# Patient Record
Sex: MALE | Race: WHITE | NOT HISPANIC OR LATINO | Employment: OTHER | RURAL
[De-identification: names, ages, dates, MRNs, and addresses within clinical notes are randomized per-mention and may not be internally consistent; named-entity substitution may affect disease eponyms.]

---

## 2020-07-21 ENCOUNTER — HISTORICAL (OUTPATIENT)
Dept: ADMINISTRATIVE | Facility: HOSPITAL | Age: 70
End: 2020-07-21

## 2021-08-24 ENCOUNTER — OFFICE VISIT (OUTPATIENT)
Dept: FAMILY MEDICINE | Facility: CLINIC | Age: 71
End: 2021-08-24
Payer: MEDICARE

## 2021-08-24 DIAGNOSIS — L72.3 SEBACEOUS CYST: Chronic | ICD-10-CM

## 2021-08-24 DIAGNOSIS — I10 ESSENTIAL HYPERTENSION: Primary | ICD-10-CM

## 2021-08-24 DIAGNOSIS — L57.0 ACTINIC KERATOSIS: Chronic | ICD-10-CM

## 2021-08-24 DIAGNOSIS — E78.2 MIXED HYPERLIPIDEMIA: Chronic | ICD-10-CM

## 2021-08-24 LAB
ANION GAP SERPL CALCULATED.3IONS-SCNC: 11 MMOL/L (ref 7–16)
BACTERIA #/AREA URNS HPF: NORMAL /HPF
BASOPHILS # BLD AUTO: 0.04 K/UL (ref 0–0.2)
BASOPHILS NFR BLD AUTO: 0.6 % (ref 0–1)
BILIRUB UR QL STRIP: NEGATIVE
BUN SERPL-MCNC: 22 MG/DL (ref 7–18)
BUN/CREAT SERPL: 16 (ref 6–20)
CALCIUM SERPL-MCNC: 9.3 MG/DL (ref 8.5–10.1)
CHLORIDE SERPL-SCNC: 102 MMOL/L (ref 98–107)
CHOLEST SERPL-MCNC: 183 MG/DL (ref 0–200)
CHOLEST/HDLC SERPL: 4.4 {RATIO}
CLARITY UR: CLEAR
CO2 SERPL-SCNC: 31 MMOL/L (ref 21–32)
COLOR UR: YELLOW
CREAT SERPL-MCNC: 1.37 MG/DL (ref 0.7–1.3)
DIFFERENTIAL METHOD BLD: ABNORMAL
EOSINOPHIL # BLD AUTO: 0.07 K/UL (ref 0–0.5)
EOSINOPHIL NFR BLD AUTO: 1.1 % (ref 1–4)
ERYTHROCYTE [DISTWIDTH] IN BLOOD BY AUTOMATED COUNT: 14.1 % (ref 11.5–14.5)
GLUCOSE SERPL-MCNC: 96 MG/DL (ref 74–106)
GLUCOSE UR STRIP-MCNC: NEGATIVE MG/DL
HCT VFR BLD AUTO: 46.2 % (ref 40–54)
HDLC SERPL-MCNC: 42 MG/DL (ref 40–60)
HGB BLD-MCNC: 14.7 G/DL (ref 13.5–18)
IMM GRANULOCYTES # BLD AUTO: 0.03 K/UL (ref 0–0.04)
IMM GRANULOCYTES NFR BLD: 0.5 % (ref 0–0.4)
KETONES UR STRIP-SCNC: NEGATIVE MG/DL
LDLC SERPL CALC-MCNC: 122 MG/DL
LDLC/HDLC SERPL: 2.9 {RATIO}
LEUKOCYTE ESTERASE UR QL STRIP: NEGATIVE
LYMPHOCYTES # BLD AUTO: 1.72 K/UL (ref 1–4.8)
LYMPHOCYTES NFR BLD AUTO: 26.4 % (ref 27–41)
MCH RBC QN AUTO: 28.6 PG (ref 27–31)
MCHC RBC AUTO-ENTMCNC: 31.8 G/DL (ref 32–36)
MCV RBC AUTO: 89.9 FL (ref 80–96)
MONOCYTES # BLD AUTO: 0.6 K/UL (ref 0–0.8)
MONOCYTES NFR BLD AUTO: 9.2 % (ref 2–6)
MPC BLD CALC-MCNC: 11.1 FL (ref 9.4–12.4)
NEUTROPHILS # BLD AUTO: 4.05 K/UL (ref 1.8–7.7)
NEUTROPHILS NFR BLD AUTO: 62.2 % (ref 53–65)
NITRITE UR QL STRIP: NEGATIVE
NONHDLC SERPL-MCNC: 141 MG/DL
NRBC # BLD AUTO: 0 X10E3/UL
NRBC, AUTO (.00): 0 %
PH UR STRIP: 6 PH UNITS
PLATELET # BLD AUTO: 145 K/UL (ref 150–400)
POTASSIUM SERPL-SCNC: 4.1 MMOL/L (ref 3.5–5.1)
PROT UR QL STRIP: ABNORMAL
RBC # BLD AUTO: 5.14 M/UL (ref 4.6–6.2)
RBC # UR STRIP: ABNORMAL /UL
RBC #/AREA URNS HPF: NORMAL /HPF
SODIUM SERPL-SCNC: 140 MMOL/L (ref 136–145)
SP GR UR STRIP: 1.01
TRIGL SERPL-MCNC: 95 MG/DL (ref 35–150)
UROBILINOGEN UR STRIP-ACNC: 0.2 MG/DL
VLDLC SERPL-MCNC: 19 MG/DL
WBC # BLD AUTO: 6.51 K/UL (ref 4.5–11)
WBC #/AREA URNS HPF: NORMAL /HPF

## 2021-08-24 PROCEDURE — 80048 BASIC METABOLIC PANEL: ICD-10-PCS | Mod: ,,, | Performed by: CLINICAL MEDICAL LABORATORY

## 2021-08-24 PROCEDURE — 82043 UR ALBUMIN QUANTITATIVE: CPT | Mod: ,,, | Performed by: CLINICAL MEDICAL LABORATORY

## 2021-08-24 PROCEDURE — 17003 DESTRUCT PREMALG LES 2-14: CPT | Mod: ,,, | Performed by: FAMILY MEDICINE

## 2021-08-24 PROCEDURE — 82043 MICROALBUMIN / CREATININE RATIO URINE: ICD-10-PCS | Mod: ,,, | Performed by: CLINICAL MEDICAL LABORATORY

## 2021-08-24 PROCEDURE — 17003 DESTRUCTION, PREMALIGNANT LESIONS; SECOND THROUGH 14 LESIONS: ICD-10-PCS | Mod: ,,, | Performed by: FAMILY MEDICINE

## 2021-08-24 PROCEDURE — 80061 LIPID PANEL: ICD-10-PCS | Mod: ,,, | Performed by: CLINICAL MEDICAL LABORATORY

## 2021-08-24 PROCEDURE — 80061 LIPID PANEL: CPT | Mod: ,,, | Performed by: CLINICAL MEDICAL LABORATORY

## 2021-08-24 PROCEDURE — 81001 URINALYSIS, REFLEX TO URINE CULTURE: ICD-10-PCS | Mod: ,,, | Performed by: CLINICAL MEDICAL LABORATORY

## 2021-08-24 PROCEDURE — 17000 DESTRUCT PREMALG LESION: CPT | Mod: ,,, | Performed by: FAMILY MEDICINE

## 2021-08-24 PROCEDURE — 17000 PR DESTRUCTION(LASER SURGERY,CRYOSURGERY,CHEMOSURGERY),PREMALIGNANT LESIONS,FIRST LESION: ICD-10-PCS | Mod: ,,, | Performed by: FAMILY MEDICINE

## 2021-08-24 PROCEDURE — 85025 CBC WITH DIFFERENTIAL: ICD-10-PCS | Mod: ,,, | Performed by: CLINICAL MEDICAL LABORATORY

## 2021-08-24 PROCEDURE — 85025 COMPLETE CBC W/AUTO DIFF WBC: CPT | Mod: ,,, | Performed by: CLINICAL MEDICAL LABORATORY

## 2021-08-24 PROCEDURE — 99214 OFFICE O/P EST MOD 30 MIN: CPT | Mod: 25,,, | Performed by: FAMILY MEDICINE

## 2021-08-24 PROCEDURE — 82570 MICROALBUMIN / CREATININE RATIO URINE: ICD-10-PCS | Mod: ,,, | Performed by: CLINICAL MEDICAL LABORATORY

## 2021-08-24 PROCEDURE — 81001 URINALYSIS AUTO W/SCOPE: CPT | Mod: ,,, | Performed by: CLINICAL MEDICAL LABORATORY

## 2021-08-24 PROCEDURE — 99214 PR OFFICE/OUTPT VISIT, EST, LEVL IV, 30-39 MIN: ICD-10-PCS | Mod: 25,,, | Performed by: FAMILY MEDICINE

## 2021-08-24 PROCEDURE — 80048 BASIC METABOLIC PNL TOTAL CA: CPT | Mod: ,,, | Performed by: CLINICAL MEDICAL LABORATORY

## 2021-08-24 PROCEDURE — 82570 ASSAY OF URINE CREATININE: CPT | Mod: ,,, | Performed by: CLINICAL MEDICAL LABORATORY

## 2021-08-24 RX ORDER — LOSARTAN POTASSIUM 100 MG/1
100 TABLET ORAL DAILY
COMMUNITY
Start: 2021-06-07 | End: 2021-08-24 | Stop reason: SDUPTHER

## 2021-08-24 RX ORDER — AMLODIPINE BESYLATE 10 MG/1
10 TABLET ORAL DAILY
COMMUNITY
Start: 2021-05-20 | End: 2021-08-24 | Stop reason: SDUPTHER

## 2021-08-24 RX ORDER — OXYBUTYNIN CHLORIDE 5 MG/1
5 TABLET ORAL NIGHTLY
COMMUNITY
Start: 2021-06-04 | End: 2023-01-21

## 2021-08-24 RX ORDER — AMLODIPINE BESYLATE 10 MG/1
10 TABLET ORAL DAILY
Qty: 90 TABLET | Refills: 1 | Status: SHIPPED | OUTPATIENT
Start: 2021-08-24 | End: 2022-02-11 | Stop reason: SDUPTHER

## 2021-08-24 RX ORDER — ATORVASTATIN CALCIUM 20 MG/1
20 TABLET, FILM COATED ORAL DAILY
Qty: 90 TABLET | Refills: 1 | Status: SHIPPED | OUTPATIENT
Start: 2021-08-24 | End: 2022-04-05 | Stop reason: SDUPTHER

## 2021-08-24 RX ORDER — LOSARTAN POTASSIUM 100 MG/1
100 TABLET ORAL DAILY
Qty: 90 TABLET | Refills: 1 | Status: SHIPPED | OUTPATIENT
Start: 2021-08-24 | End: 2022-03-03

## 2021-08-24 RX ORDER — ROSUVASTATIN CALCIUM 10 MG/1
10 TABLET, COATED ORAL NIGHTLY
COMMUNITY
Start: 2021-05-21 | End: 2021-08-24

## 2021-08-26 LAB
CREAT UR-MCNC: 137 MG/DL (ref 39–259)
MICROALBUMIN UR-MCNC: 13.3 MG/DL (ref 0–2.8)
MICROALBUMIN/CREAT RATIO PNL UR: 97.1 MG/G (ref 0–30)

## 2021-08-31 ENCOUNTER — TELEPHONE (OUTPATIENT)
Dept: FAMILY MEDICINE | Facility: CLINIC | Age: 71
End: 2021-08-31

## 2021-08-31 DIAGNOSIS — R89.9 ABNORMAL LABORATORY TEST: ICD-10-CM

## 2021-08-31 DIAGNOSIS — I10 ESSENTIAL HYPERTENSION: Primary | ICD-10-CM

## 2021-09-17 ENCOUNTER — TELEPHONE (OUTPATIENT)
Dept: FAMILY MEDICINE | Facility: CLINIC | Age: 71
End: 2021-09-17

## 2021-10-09 ENCOUNTER — OFFICE VISIT (OUTPATIENT)
Dept: FAMILY MEDICINE | Facility: CLINIC | Age: 71
End: 2021-10-09
Payer: MEDICARE

## 2021-10-09 VITALS
BODY MASS INDEX: 31.42 KG/M2 | RESPIRATION RATE: 18 BRPM | OXYGEN SATURATION: 97 % | DIASTOLIC BLOOD PRESSURE: 84 MMHG | SYSTOLIC BLOOD PRESSURE: 160 MMHG | HEIGHT: 72 IN | HEART RATE: 62 BPM | WEIGHT: 232 LBS | TEMPERATURE: 98 F

## 2021-10-09 DIAGNOSIS — M54.12 CERVICAL RADICULOPATHY: Primary | ICD-10-CM

## 2021-10-09 DIAGNOSIS — I10 ESSENTIAL HYPERTENSION: ICD-10-CM

## 2021-10-09 PROCEDURE — 99051 MED SERV EVE/WKEND/HOLIDAY: CPT | Mod: ,,, | Performed by: NURSE PRACTITIONER

## 2021-10-09 PROCEDURE — 99051 PR MEDICAL SERVICES, EVE/WKEND/HOLIDAY: ICD-10-PCS | Mod: ,,, | Performed by: NURSE PRACTITIONER

## 2021-10-09 PROCEDURE — 99213 OFFICE O/P EST LOW 20 MIN: CPT | Mod: 25,,, | Performed by: NURSE PRACTITIONER

## 2021-10-09 PROCEDURE — 96372 PR INJECTION,THERAP/PROPH/DIAG2ST, IM OR SUBCUT: ICD-10-PCS | Mod: ,,, | Performed by: NURSE PRACTITIONER

## 2021-10-09 PROCEDURE — 96372 THER/PROPH/DIAG INJ SC/IM: CPT | Mod: ,,, | Performed by: NURSE PRACTITIONER

## 2021-10-09 PROCEDURE — 99213 PR OFFICE/OUTPT VISIT, EST, LEVL III, 20-29 MIN: ICD-10-PCS | Mod: 25,,, | Performed by: NURSE PRACTITIONER

## 2021-10-09 RX ORDER — KETOROLAC TROMETHAMINE 30 MG/ML
30 INJECTION, SOLUTION INTRAMUSCULAR; INTRAVENOUS
Status: COMPLETED | OUTPATIENT
Start: 2021-10-09 | End: 2021-10-09

## 2021-10-09 RX ORDER — DEXAMETHASONE SODIUM PHOSPHATE 4 MG/ML
8 INJECTION, SOLUTION INTRA-ARTICULAR; INTRALESIONAL; INTRAMUSCULAR; INTRAVENOUS; SOFT TISSUE
Status: COMPLETED | OUTPATIENT
Start: 2021-10-09 | End: 2021-10-09

## 2021-10-09 RX ORDER — ASPIRIN 81 MG/1
81 TABLET ORAL DAILY
COMMUNITY

## 2021-10-09 RX ORDER — METHYLPREDNISOLONE 4 MG/1
TABLET ORAL
Qty: 1 PACKAGE | Refills: 0 | Status: SHIPPED | OUTPATIENT
Start: 2021-10-09 | End: 2021-10-30

## 2021-10-09 RX ADMIN — KETOROLAC TROMETHAMINE 30 MG: 30 INJECTION, SOLUTION INTRAMUSCULAR; INTRAVENOUS at 09:10

## 2021-10-09 RX ADMIN — DEXAMETHASONE SODIUM PHOSPHATE 8 MG: 4 INJECTION, SOLUTION INTRA-ARTICULAR; INTRALESIONAL; INTRAMUSCULAR; INTRAVENOUS; SOFT TISSUE at 09:10

## 2022-02-11 DIAGNOSIS — I10 ESSENTIAL HYPERTENSION: ICD-10-CM

## 2022-02-11 RX ORDER — AMLODIPINE BESYLATE 10 MG/1
10 TABLET ORAL DAILY
Qty: 90 TABLET | Refills: 0 | Status: SHIPPED | OUTPATIENT
Start: 2022-02-11 | End: 2022-04-05 | Stop reason: SDUPTHER

## 2022-02-23 ENCOUNTER — OFFICE VISIT (OUTPATIENT)
Dept: FAMILY MEDICINE | Facility: CLINIC | Age: 72
End: 2022-02-23
Payer: MEDICARE

## 2022-02-23 DIAGNOSIS — Z02.89 ENCOUNTER FOR OTHER ADMINISTRATIVE EXAMINATIONS: Primary | ICD-10-CM

## 2022-02-23 PROCEDURE — 99499 UNLISTED E&M SERVICE: CPT | Mod: ,,, | Performed by: NURSE PRACTITIONER

## 2022-02-23 PROCEDURE — 99499 PR PHYSICAL - DOT/CDL: ICD-10-PCS | Mod: ,,, | Performed by: NURSE PRACTITIONER

## 2022-02-24 VITALS
RESPIRATION RATE: 20 BRPM | DIASTOLIC BLOOD PRESSURE: 64 MMHG | BODY MASS INDEX: 30.16 KG/M2 | TEMPERATURE: 99 F | HEART RATE: 62 BPM | WEIGHT: 235 LBS | SYSTOLIC BLOOD PRESSURE: 138 MMHG | HEIGHT: 74 IN | OXYGEN SATURATION: 97 %

## 2022-02-24 NOTE — PROGRESS NOTES
Jailyn Mulligan DNP, EMANI    [address] [place] [address]     PATIENT NAME: Theodore Begum  : 1950  DATE: 22  MRN: [id number]      Billing Provider: Jailyn Mulligan DNP, FNP  Level of Service: NO LOS  Patient PCP Information     Provider PCP Type    Joseph Alex MD General          Reason for Visit / Chief Complaint: No chief complaint on file.       Update PCP  Update Chief Complaint         History of Present Illness / Problem Focused Workflow     Theodore Begum presents to the clinic with No chief complaint on file.     Pt here for DOT. Hx htn. Established pt of Dr. Alex.       Review of Systems     Review of Systems   Constitutional: Negative for activity change and appetite change.   HENT: Negative for dental problem, ear pain, sinus pressure/congestion and sore throat.    Respiratory: Negative for cough, chest tightness and shortness of breath.    Cardiovascular: Negative for chest pain and palpitations.   Gastrointestinal: Negative for abdominal pain.   Genitourinary: Negative for bladder incontinence and dysuria.   Musculoskeletal: Negative for arthralgias.   Integumentary:  Negative for rash.   Neurological: Negative for dizziness, weakness and numbness.        Medical / Social / Family History     Past Medical History:   Diagnosis Date    Hyperlipidemia     Hypertension        Past Surgical History:   Procedure Laterality Date    ABDOMINAL AORTA STENT      CHOLECYSTECTOMY      HERNIA REPAIR      PROSTATE SURGERY      REPLACEMENT OF STENT         Social History  Mr. Theodore Begum  reports that he has been smoking cigarettes. He has smoked for the past 1.00 year. He has never used smokeless tobacco. He reports that he does not drink alcohol and does not use drugs.    Family History  Mr. Theodore Begum's family history includes No Known Problems in his father and mother.    Medications and Allergies     Medications  Outpatient Medications Marked as  Taking for the 2/23/22 encounter (Office Visit) with Jailyn Mulligan, DNP, FNP   Medication Sig Dispense Refill    amLODIPine (NORVASC) 10 MG tablet Take 1 tablet (10 mg total) by mouth once daily. For BLOOD PRESSURE 90 tablet 0    aspirin (ECOTRIN) 81 MG EC tablet Take 81 mg by mouth once daily.      atorvastatin (LIPITOR) 20 MG tablet Take 1 tablet (20 mg total) by mouth once daily. For CHOLESTEROL 90 tablet 1    losartan (COZAAR) 100 MG tablet Take 1 tablet (100 mg total) by mouth once daily. For BLOOD PRESSURE 90 tablet 1    oxybutynin (DITROPAN) 5 MG Tab Take 5 mg by mouth every evening.         Allergies  Review of patient's allergies indicates:   Allergen Reactions    Moxifloxacin Shortness Of Breath    Cefaclor Other (See Comments)     Abdominal pain       Physical Examination     Vitals:    02/23/22 1730   BP: 138/64   Pulse:    Resp:    Temp:      Physical Exam  Vitals and nursing note reviewed.   Constitutional:       General: He is not in acute distress.     Appearance: He is normal weight.   HENT:      Mouth/Throat:      Mouth: Mucous membranes are moist.   Eyes:      Pupils: Pupils are equal, round, and reactive to light.   Cardiovascular:      Rate and Rhythm: Normal rate and regular rhythm.      Pulses: Normal pulses.      Heart sounds: No murmur heard.  Pulmonary:      Effort: Pulmonary effort is normal. No respiratory distress.      Breath sounds: Normal breath sounds. No wheezing, rhonchi or rales.   Chest:      Chest wall: No tenderness.   Abdominal:      General: Bowel sounds are normal. There is no distension.      Palpations: Abdomen is soft.      Tenderness: There is no abdominal tenderness. There is no right CVA tenderness, left CVA tenderness, guarding or rebound.   Musculoskeletal:         General: No swelling or tenderness. Normal range of motion.      Cervical back: Normal range of motion and neck supple.      Right lower leg: No edema.      Left lower leg: No edema.   Skin:      General: Skin is warm and dry.   Neurological:      General: No focal deficit present.      Mental Status: He is alert and oriented to person, place, and time.   Psychiatric:         Mood and Affect: Mood normal.         Behavior: Behavior normal.         Thought Content: Thought content normal.         Judgment: Judgment normal.          Assessment and Plan (including Health Maintenance)      Problem List  Smart Sets  Document Outside HM   :    Plan:     See scanned document.     Health Maintenance Due   Topic Date Due    Hepatitis C Screening  Never done    TETANUS VACCINE  Never done    Colorectal Cancer Screening  Never done    Shingles Vaccine (1 of 2) Never done    Pneumococcal Vaccines (Age 65+) (1 of 1 - PPSV23) Never done    Abdominal Aortic Aneurysm Screening  Never done    COVID-19 Vaccine (2 - Pfizer 3-dose series) 03/05/2021    Influenza Vaccine (1) Never done       Problem List Items Addressed This Visit    None     Visit Diagnoses     Encounter for other administrative examinations    -  Primary        Encounter for other administrative examinations       Health Maintenance Topics with due status: Not Due       Topic Last Completion Date    Lipid Panel 08/24/2021       No future appointments.     No follow-ups on file.     Signature:  Jailyn Mulligan DNP, FNP  [address] [place] [address]  Phone #: [phone]  Fax #: [phone]    Date of encounter: 2/23/22    Patient Instructions   Follow up in 1 year for DOT. Follow up with primary care provider as needed.

## 2022-03-03 DIAGNOSIS — I10 ESSENTIAL HYPERTENSION: ICD-10-CM

## 2022-03-03 RX ORDER — LOSARTAN POTASSIUM 100 MG/1
TABLET ORAL
Qty: 90 TABLET | OUTPATIENT
Start: 2022-03-03

## 2022-03-03 NOTE — TELEPHONE ENCOUNTER
Tell him his pharmacy denied the prescription. They are trying to force me to prescribe 90 day refill, when he is due a follow up.

## 2022-03-11 DIAGNOSIS — Z71.89 COMPLEX CARE COORDINATION: ICD-10-CM

## 2022-04-05 ENCOUNTER — OFFICE VISIT (OUTPATIENT)
Dept: FAMILY MEDICINE | Facility: CLINIC | Age: 72
End: 2022-04-05
Payer: MEDICARE

## 2022-04-05 VITALS
OXYGEN SATURATION: 94 % | SYSTOLIC BLOOD PRESSURE: 156 MMHG | DIASTOLIC BLOOD PRESSURE: 79 MMHG | RESPIRATION RATE: 18 BRPM | BODY MASS INDEX: 31.14 KG/M2 | HEIGHT: 73 IN | HEART RATE: 68 BPM | WEIGHT: 235 LBS

## 2022-04-05 DIAGNOSIS — Z87.891 HISTORY OF TOBACCO USE: ICD-10-CM

## 2022-04-05 DIAGNOSIS — I10 ESSENTIAL HYPERTENSION: ICD-10-CM

## 2022-04-05 DIAGNOSIS — E78.2 MIXED HYPERLIPIDEMIA: Chronic | ICD-10-CM

## 2022-04-05 DIAGNOSIS — J41.0 SIMPLE CHRONIC BRONCHITIS: Primary | Chronic | ICD-10-CM

## 2022-04-05 DIAGNOSIS — F17.200 TOBACCO DEPENDENCE SYNDROME: ICD-10-CM

## 2022-04-05 LAB
ANION GAP SERPL CALCULATED.3IONS-SCNC: 7 MMOL/L (ref 7–16)
BACTERIA #/AREA URNS HPF: ABNORMAL /HPF
BASOPHILS # BLD AUTO: 0.05 K/UL (ref 0–0.2)
BASOPHILS NFR BLD AUTO: 0.8 % (ref 0–1)
BILIRUB UR QL STRIP: NEGATIVE
BUN SERPL-MCNC: 22 MG/DL (ref 7–18)
BUN/CREAT SERPL: 15 (ref 6–20)
CALCIUM SERPL-MCNC: 9.1 MG/DL (ref 8.5–10.1)
CHLORIDE SERPL-SCNC: 106 MMOL/L (ref 98–107)
CHOLEST SERPL-MCNC: 113 MG/DL (ref 0–200)
CHOLEST/HDLC SERPL: 2.6 {RATIO}
CLARITY UR: CLEAR
CO2 SERPL-SCNC: 34 MMOL/L (ref 21–32)
COLOR UR: YELLOW
CREAT SERPL-MCNC: 1.48 MG/DL (ref 0.7–1.3)
CREAT UR-MCNC: 194 MG/DL (ref 39–259)
DIFFERENTIAL METHOD BLD: ABNORMAL
EOSINOPHIL # BLD AUTO: 0.11 K/UL (ref 0–0.5)
EOSINOPHIL NFR BLD AUTO: 1.7 % (ref 1–4)
ERYTHROCYTE [DISTWIDTH] IN BLOOD BY AUTOMATED COUNT: 14.4 % (ref 11.5–14.5)
GLUCOSE SERPL-MCNC: 108 MG/DL (ref 74–106)
GLUCOSE UR STRIP-MCNC: NEGATIVE MG/DL
HCT VFR BLD AUTO: 44.2 % (ref 40–54)
HDLC SERPL-MCNC: 44 MG/DL (ref 40–60)
HGB BLD-MCNC: 13.4 G/DL (ref 13.5–18)
IMM GRANULOCYTES # BLD AUTO: 0.02 K/UL (ref 0–0.04)
IMM GRANULOCYTES NFR BLD: 0.3 % (ref 0–0.4)
KETONES UR STRIP-SCNC: NEGATIVE MG/DL
LDLC SERPL CALC-MCNC: 45 MG/DL
LDLC/HDLC SERPL: 1 {RATIO}
LEUKOCYTE ESTERASE UR QL STRIP: NEGATIVE
LYMPHOCYTES # BLD AUTO: 1.92 K/UL (ref 1–4.8)
LYMPHOCYTES NFR BLD AUTO: 29.7 % (ref 27–41)
MCH RBC QN AUTO: 28.6 PG (ref 27–31)
MCHC RBC AUTO-ENTMCNC: 30.3 G/DL (ref 32–36)
MCV RBC AUTO: 94.4 FL (ref 80–96)
MICROALBUMIN UR-MCNC: 42.5 MG/DL (ref 0–2.8)
MICROALBUMIN/CREAT RATIO PNL UR: 219.1 MG/G (ref 0–30)
MONOCYTES # BLD AUTO: 0.51 K/UL (ref 0–0.8)
MONOCYTES NFR BLD AUTO: 7.9 % (ref 2–6)
MPC BLD CALC-MCNC: 10.7 FL (ref 9.4–12.4)
NEUTROPHILS # BLD AUTO: 3.86 K/UL (ref 1.8–7.7)
NEUTROPHILS NFR BLD AUTO: 59.6 % (ref 53–65)
NITRITE UR QL STRIP: NEGATIVE
NONHDLC SERPL-MCNC: 69 MG/DL
NRBC # BLD AUTO: 0 X10E3/UL
NRBC, AUTO (.00): 0 %
PH UR STRIP: 6.5 PH UNITS
PLATELET # BLD AUTO: 171 K/UL (ref 150–400)
POTASSIUM SERPL-SCNC: 4 MMOL/L (ref 3.5–5.1)
PROT UR QL STRIP: 30
RBC # BLD AUTO: 4.68 M/UL (ref 4.6–6.2)
RBC # UR STRIP: ABNORMAL /UL
RBC #/AREA URNS HPF: ABNORMAL /HPF
SODIUM SERPL-SCNC: 143 MMOL/L (ref 136–145)
SP GR UR STRIP: 1.02
SQUAMOUS #/AREA URNS LPF: ABNORMAL /LPF
TRIGL SERPL-MCNC: 121 MG/DL (ref 35–150)
UROBILINOGEN UR STRIP-ACNC: 1 MG/DL
VLDLC SERPL-MCNC: 24 MG/DL
WBC # BLD AUTO: 6.47 K/UL (ref 4.5–11)
WBC #/AREA URNS HPF: ABNORMAL /HPF

## 2022-04-05 PROCEDURE — 81001 URINALYSIS: ICD-10-PCS | Mod: ,,, | Performed by: CLINICAL MEDICAL LABORATORY

## 2022-04-05 PROCEDURE — 99406 PR TOBACCO USE CESSATION INTERMEDIATE 3-10 MINUTES: ICD-10-PCS | Mod: ,,, | Performed by: FAMILY MEDICINE

## 2022-04-05 PROCEDURE — 80048 BASIC METABOLIC PNL TOTAL CA: CPT | Mod: ,,, | Performed by: CLINICAL MEDICAL LABORATORY

## 2022-04-05 PROCEDURE — 99406 BEHAV CHNG SMOKING 3-10 MIN: CPT | Mod: ,,, | Performed by: FAMILY MEDICINE

## 2022-04-05 PROCEDURE — 99214 OFFICE O/P EST MOD 30 MIN: CPT | Mod: 25,,, | Performed by: FAMILY MEDICINE

## 2022-04-05 PROCEDURE — 99214 PR OFFICE/OUTPT VISIT, EST, LEVL IV, 30-39 MIN: ICD-10-PCS | Mod: 25,,, | Performed by: FAMILY MEDICINE

## 2022-04-05 PROCEDURE — 82043 MICROALBUMIN / CREATININE RATIO URINE: ICD-10-PCS | Mod: ,,, | Performed by: CLINICAL MEDICAL LABORATORY

## 2022-04-05 PROCEDURE — 80061 LIPID PANEL: CPT | Mod: ,,, | Performed by: CLINICAL MEDICAL LABORATORY

## 2022-04-05 PROCEDURE — 81001 URINALYSIS AUTO W/SCOPE: CPT | Mod: ,,, | Performed by: CLINICAL MEDICAL LABORATORY

## 2022-04-05 PROCEDURE — 85025 CBC WITH DIFFERENTIAL: ICD-10-PCS | Mod: ,,, | Performed by: CLINICAL MEDICAL LABORATORY

## 2022-04-05 PROCEDURE — 80048 BASIC METABOLIC PANEL: ICD-10-PCS | Mod: ,,, | Performed by: CLINICAL MEDICAL LABORATORY

## 2022-04-05 PROCEDURE — 85025 COMPLETE CBC W/AUTO DIFF WBC: CPT | Mod: ,,, | Performed by: CLINICAL MEDICAL LABORATORY

## 2022-04-05 PROCEDURE — 82570 ASSAY OF URINE CREATININE: CPT | Mod: ,,, | Performed by: CLINICAL MEDICAL LABORATORY

## 2022-04-05 PROCEDURE — 82570 MICROALBUMIN / CREATININE RATIO URINE: ICD-10-PCS | Mod: ,,, | Performed by: CLINICAL MEDICAL LABORATORY

## 2022-04-05 PROCEDURE — 80061 LIPID PANEL: ICD-10-PCS | Mod: ,,, | Performed by: CLINICAL MEDICAL LABORATORY

## 2022-04-05 PROCEDURE — 82043 UR ALBUMIN QUANTITATIVE: CPT | Mod: ,,, | Performed by: CLINICAL MEDICAL LABORATORY

## 2022-04-05 RX ORDER — ALBUTEROL SULFATE 90 UG/1
2 AEROSOL, METERED RESPIRATORY (INHALATION) EVERY 6 HOURS PRN
Qty: 18 G | Refills: 5 | Status: SHIPPED | OUTPATIENT
Start: 2022-04-05 | End: 2023-10-05

## 2022-04-05 RX ORDER — AMLODIPINE BESYLATE 10 MG/1
10 TABLET ORAL DAILY
Qty: 90 TABLET | Refills: 1 | Status: SHIPPED | OUTPATIENT
Start: 2022-04-05 | End: 2022-05-05 | Stop reason: SINTOL

## 2022-04-05 RX ORDER — BUPROPION HYDROCHLORIDE 100 MG/1
100 TABLET ORAL 2 TIMES DAILY
Qty: 60 TABLET | Refills: 2 | Status: SHIPPED | OUTPATIENT
Start: 2022-04-05 | End: 2022-08-08 | Stop reason: ALTCHOICE

## 2022-04-05 RX ORDER — ATORVASTATIN CALCIUM 20 MG/1
20 TABLET, FILM COATED ORAL DAILY
Qty: 90 TABLET | Refills: 1 | Status: SHIPPED | OUTPATIENT
Start: 2022-04-05 | End: 2022-08-08

## 2022-04-05 RX ORDER — LOSARTAN POTASSIUM 100 MG/1
100 TABLET ORAL DAILY
Qty: 90 TABLET | Refills: 1 | Status: SHIPPED | OUTPATIENT
Start: 2022-04-05 | End: 2022-10-03

## 2022-04-05 NOTE — PROGRESS NOTES
Joseph Alex MD    [address] [place] [address]     PATIENT NAME: Theodore Begum  : 1950  DATE: 22  MRN: [id number]      Billing Provider: Joseph Alex MD  Level of Service:   Patient PCP Information     Provider PCP Type    Joseph Alex MD General          Reason for Visit / Chief Complaint: Follow-up (Follow up on chronic health problems and medication refills)       Update PCP  Update Chief Complaint         History of Present Illness / Problem Focused Workflow     Theodore Begum presents to the clinic with Follow-up (Follow up on chronic health problems and medication refills)     HPI    Review of Systems     Review of Systems   Constitutional: Negative for activity change, appetite change, chills, fatigue and fever.   HENT: Negative for nasal congestion, ear pain, hearing loss, postnasal drip and sore throat.    Respiratory: Negative for cough, chest tightness, shortness of breath and wheezing.    Cardiovascular: Negative for chest pain, palpitations, leg swelling and claudication.   Gastrointestinal: Negative for abdominal pain, change in bowel habit, constipation, diarrhea, nausea, vomiting and change in bowel habit.   Genitourinary: Negative for dysuria.   Musculoskeletal: Negative for arthralgias, back pain, gait problem and myalgias.   Integumentary:  Negative for rash.   Neurological: Negative for weakness and headaches.   Psychiatric/Behavioral: Negative for suicidal ideas. The patient is not nervous/anxious.         Medical / Social / Family History     History reviewed. No pertinent past medical history.    Past Surgical History:   Procedure Laterality Date    ABDOMINAL AORTA STENT      CHOLECYSTECTOMY      HERNIA REPAIR      PROSTATE SURGERY      REPLACEMENT OF STENT         Social History    reports that he has been smoking cigarettes. He has smoked for the past 1.00 year. He has never used smokeless tobacco. He  reports that he does not drink alcohol and does not use drugs.    Family History  MrJalil's family history includes No Known Problems in his father and mother.    Medications and Allergies     Medications  Outpatient Medications Marked as Taking for the 4/5/22 encounter (Office Visit) with Joseph Alex MD   Medication Sig Dispense Refill    aspirin (ECOTRIN) 81 MG EC tablet Take 81 mg by mouth once daily.      oxybutynin (DITROPAN) 5 MG Tab Take 5 mg by mouth every evening.      [DISCONTINUED] amLODIPine (NORVASC) 10 MG tablet Take 1 tablet (10 mg total) by mouth once daily. For BLOOD PRESSURE 90 tablet 0    [DISCONTINUED] atorvastatin (LIPITOR) 20 MG tablet Take 1 tablet (20 mg total) by mouth once daily. For CHOLESTEROL 90 tablet 1    [DISCONTINUED] losartan (COZAAR) 100 MG tablet TAKE 1 TABLET BY MOUTH EVERY DAY 30 tablet 0       Allergies  Review of patient's allergies indicates:   Allergen Reactions    Moxifloxacin Shortness Of Breath    Cefaclor Other (See Comments)     Abdominal pain       Physical Examination     Vitals:    04/05/22 1435   BP: (!) 156/79   Pulse:    Resp:      Physical Exam  Vitals and nursing note reviewed.   Constitutional:       General: He is not in acute distress.     Appearance: Normal appearance. He is not ill-appearing.   Eyes:      Extraocular Movements: Extraocular movements intact.      Pupils: Pupils are equal, round, and reactive to light.   Cardiovascular:      Rate and Rhythm: Normal rate and regular rhythm.      Heart sounds: Normal heart sounds.   Pulmonary:      Effort: Pulmonary effort is normal.      Breath sounds: Normal breath sounds.   Abdominal:      General: Bowel sounds are normal.      Palpations: Abdomen is soft.   Musculoskeletal:         General: Normal range of motion.   Skin:     Findings: No rash.   Neurological:      General: No focal deficit present.      Mental Status: He is alert and oriented to person, place, and time. Mental status is at  baseline.   Psychiatric:         Mood and Affect: Mood normal.         Behavior: Behavior normal.          Assessment and Plan (including Health Maintenance)      Problem List  Smart Sets  Document Outside HM   :    Plan:         Health Maintenance Due   Topic Date Due    TETANUS VACCINE  Never done    Colorectal Cancer Screening  Never done    Pneumococcal Vaccines (Age 65+) (1 of 1 - PPSV23) Never done    Abdominal Aortic Aneurysm Screening  Never done    COVID-19 Vaccine (2 - Pfizer 3-dose series) 03/05/2021       Problem List Items Addressed This Visit        Pulmonary    Simple chronic bronchitis - Primary (Chronic)    Relevant Medications    albuterol (VENTOLIN HFA) 90 mcg/actuation inhaler       Cardiac/Vascular    Mixed hyperlipidemia (Chronic)    Relevant Medications    atorvastatin (LIPITOR) 20 MG tablet    Essential hypertension (Chronic)    Current Assessment & Plan      - Goal blood pressure for most patients is less than 130/85. Both numbers are important. If you have questions about your specific goal blood pressure, please ask at your clinic visits.   - Check blood pressure outside of clinic, record the numbers, and bring the log to all your office visits.   - Take a daily, 81mg Aspirin, unless instructed to take a different dose for another medical purpose. Also do not take Aspirin if you have a history of adverse reaction to Aspirin.   - If you have any chest pain, SOB, or other concerning symptoms, report these immediately, or go to the nearest ER.    - Recommend cardiovascular exercise, at least 3 times per week, for at least 15 minutes.             Relevant Medications    amLODIPine (NORVASC) 10 MG tablet    losartan (COZAAR) 100 MG tablet    Other Relevant Orders    Basic Metabolic Panel    CBC Auto Differential    Lipid Panel    Microalbumin/Creatinine Ratio, Urine    Urinalysis        Simple chronic bronchitis  -     albuterol (VENTOLIN HFA) 90 mcg/actuation inhaler; Inhale 2 puffs into  the lungs every 6 (six) hours as needed for Wheezing. Rescue  Dispense: 18 g; Refill: 5    Essential hypertension  -     amLODIPine (NORVASC) 10 MG tablet; Take 1 tablet (10 mg total) by mouth once daily. For BLOOD PRESSURE  Dispense: 90 tablet; Refill: 1  -     losartan (COZAAR) 100 MG tablet; Take 1 tablet (100 mg total) by mouth once daily. For BLOOD PRESSURE  Dispense: 90 tablet; Refill: 1  -     Basic Metabolic Panel; Future; Expected date: 04/05/2022  -     CBC Auto Differential; Future; Expected date: 04/05/2022  -     Lipid Panel; Future; Expected date: 04/05/2022  -     Microalbumin/Creatinine Ratio, Urine; Future; Expected date: 04/05/2022  -     Urinalysis; Future; Expected date: 04/05/2022    Mixed hyperlipidemia  -     atorvastatin (LIPITOR) 20 MG tablet; Take 1 tablet (20 mg total) by mouth once daily. For CHOLESTEROL  Dispense: 90 tablet; Refill: 1       Health Maintenance Topics with due status: Not Due       Topic Last Completion Date    Lipid Panel 08/24/2021       Procedures     Future Appointments   Date Time Provider Department Center   10/3/2022  2:00 PM AWV NURSE, [facility] [place]        No follow-ups on file.     Signature:  Joseph Alex MD  [address] [place] [address]  Phone #: [phone]  Fax #: [phone]    Date of encounter: 4/5/22    There are no Patient Instructions on file for this visit.     Tobacco Use/Cessation:  I assessed Theodore Begum and discussed smoking cessation with him for 3-10 minutes. He desires to stop smoking. He is concerned that he is unable to stop. We added Buproprion to attempt to help with the cravings.   Social History     Tobacco Use   Smoking Status Current Every Day Smoker    Years: 1.00    Types: Cigarettes   Smokeless Tobacco Never Used

## 2022-04-08 ENCOUNTER — TELEPHONE (OUTPATIENT)
Dept: FAMILY MEDICINE | Facility: CLINIC | Age: 72
End: 2022-04-08
Payer: MEDICARE

## 2022-04-08 DIAGNOSIS — R94.4 RENAL FUNCTION TEST ABNORMAL: Primary | ICD-10-CM

## 2022-04-08 NOTE — TELEPHONE ENCOUNTER
----- Message from Joseph Alex MD sent at 4/7/2022  9:17 AM CDT -----  Renal function is mildly reduced, findings are worse than previous labs, also worsening of proteinuria     With the strong family history of amyloidosis this likely needs to be further evaluated, Nephrology is likely the best location for him

## 2022-04-18 ENCOUNTER — TELEPHONE (OUTPATIENT)
Dept: FAMILY MEDICINE | Facility: CLINIC | Age: 72
End: 2022-04-18
Payer: MEDICARE

## 2022-04-18 DIAGNOSIS — Z83.49 FAMILY HISTORY OF AMYLOIDOSIS: Primary | ICD-10-CM

## 2022-04-18 DIAGNOSIS — N18.30 CKD (CHRONIC KIDNEY DISEASE), SYMPTOM MANAGEMENT ONLY, STAGE 3 (MODERATE): Primary | ICD-10-CM

## 2022-04-18 DIAGNOSIS — R80.1 PERSISTENT PROTEINURIA: ICD-10-CM

## 2022-04-18 DIAGNOSIS — Z83.49 FAMILY HISTORY OF AMYLOIDOSIS: ICD-10-CM

## 2022-04-18 NOTE — TELEPHONE ENCOUNTER
Susi: I referred this pt to [facility] and they stated that according to labs he does not meet the criteria to see a nephrologist. His renal is not elevated enough.. So, should I try to refer him to someone else and see or what do yall recommend?    I spoke to Dr. Alex and he states to try again with this diagnosis. We put the wrong one when ordering.     Diagnoses should be:  1. CKD stage 3a  2. Family history of Amyloidosis, systemic  3. Proteinuria

## 2022-05-05 ENCOUNTER — OFFICE VISIT (OUTPATIENT)
Dept: FAMILY MEDICINE | Facility: CLINIC | Age: 72
End: 2022-05-05
Payer: MEDICARE

## 2022-05-05 VITALS
OXYGEN SATURATION: 94 % | SYSTOLIC BLOOD PRESSURE: 162 MMHG | HEART RATE: 61 BPM | DIASTOLIC BLOOD PRESSURE: 76 MMHG | HEIGHT: 73 IN | RESPIRATION RATE: 18 BRPM | BODY MASS INDEX: 31.41 KG/M2 | WEIGHT: 237 LBS

## 2022-05-05 DIAGNOSIS — G60.9 IDIOPATHIC PERIPHERAL NEUROPATHY: Chronic | ICD-10-CM

## 2022-05-05 DIAGNOSIS — Z12.11 SCREENING FOR MALIGNANT NEOPLASM OF COLON: Primary | ICD-10-CM

## 2022-05-05 DIAGNOSIS — R60.0 BILATERAL LOWER EXTREMITY EDEMA: Chronic | ICD-10-CM

## 2022-05-05 DIAGNOSIS — I83.93 VARICOSE VEINS OF BOTH LOWER EXTREMITIES WITHOUT ULCER OR INFLAMMATION: Chronic | ICD-10-CM

## 2022-05-05 DIAGNOSIS — I10 ESSENTIAL HYPERTENSION: Chronic | ICD-10-CM

## 2022-05-05 PROCEDURE — 99214 OFFICE O/P EST MOD 30 MIN: CPT | Mod: ,,, | Performed by: FAMILY MEDICINE

## 2022-05-05 PROCEDURE — 99214 PR OFFICE/OUTPT VISIT, EST, LEVL IV, 30-39 MIN: ICD-10-PCS | Mod: ,,, | Performed by: FAMILY MEDICINE

## 2022-05-05 RX ORDER — HYDROCHLOROTHIAZIDE 25 MG/1
25 TABLET ORAL DAILY
Qty: 30 TABLET | Refills: 2 | Status: SHIPPED | OUTPATIENT
Start: 2022-05-05 | End: 2022-05-05 | Stop reason: CLARIF

## 2022-05-05 RX ORDER — GABAPENTIN 100 MG/1
100 CAPSULE ORAL 3 TIMES DAILY
Qty: 90 CAPSULE | Refills: 2 | Status: SHIPPED | OUTPATIENT
Start: 2022-05-05 | End: 2022-05-05 | Stop reason: CLARIF

## 2022-05-05 RX ORDER — HYDROCHLOROTHIAZIDE 25 MG/1
25 TABLET ORAL DAILY
Qty: 30 TABLET | Refills: 2 | Status: SHIPPED | OUTPATIENT
Start: 2022-05-05 | End: 2022-08-01 | Stop reason: SDUPTHER

## 2022-05-05 RX ORDER — GABAPENTIN 100 MG/1
100 CAPSULE ORAL 3 TIMES DAILY
Qty: 90 CAPSULE | Refills: 2 | Status: SHIPPED | OUTPATIENT
Start: 2022-05-05 | End: 2022-10-20

## 2022-05-05 NOTE — PROGRESS NOTES
Joseph Alex MD    [address] [place] [address]     PATIENT NAME: Theodore Begum  : 1950  DATE: 22  MRN: [id number]      Billing Provider: Joseph Alex MD  Level of Service:   Patient PCP Information     Provider PCP Type    Joseph Alex MD General          Reason for Visit / Chief Complaint: Foot Pain (Patient states he has burning on the bottom of his left foot for a long time. Patient states there is some swelling present after being on it. )       Update PCP  Update Chief Complaint         History of Present Illness / Problem Focused Workflow     Theodore Begum presents to the clinic with Foot Pain (Patient states he has burning on the bottom of his left foot for a long time. Patient states there is some swelling present after being on it. )     Bilateral foot swelling - right foot has been swollen since he broke his right leg. Left foot has swelling just in the foot, occasionally the swelling reaches his calf, but usually in the foot. He also reports burning in his left foot pretty much all day everyday.     He is concerned the swelling may be secondary to amlodipine. But he also reports a history of varicose veins, and a procedure to fix that.         HPI    Review of Systems     Review of Systems   Constitutional: Negative for activity change, appetite change, chills, fatigue and fever.   HENT: Negative for nasal congestion, ear pain, hearing loss, postnasal drip and sore throat.    Respiratory: Negative for cough, chest tightness, shortness of breath and wheezing.    Cardiovascular: Negative for chest pain, palpitations, leg swelling and claudication.   Gastrointestinal: Negative for abdominal pain, change in bowel habit, constipation, diarrhea, nausea, vomiting and change in bowel habit.   Genitourinary: Negative for dysuria.   Musculoskeletal: Negative for arthralgias, back pain, gait problem and myalgias.   Integumentary:   Negative for rash.   Neurological: Negative for weakness and headaches.   Psychiatric/Behavioral: Negative for suicidal ideas. The patient is not nervous/anxious.         Medical / Social / Family History   History reviewed. No pertinent past medical history.    Past Surgical History:   Procedure Laterality Date    ABDOMINAL AORTA STENT      CHOLECYSTECTOMY      HERNIA REPAIR      PROSTATE SURGERY      REPLACEMENT OF STENT         Social History    reports that he has been smoking cigarettes. He has smoked for the past 1.00 year. He has never used smokeless tobacco. He reports that he does not drink alcohol and does not use drugs.    Family History  's family history includes No Known Problems in his father and mother.    Medications and Allergies     Medications  Outpatient Medications Marked as Taking for the 5/5/22 encounter (Office Visit) with Joseph Alex MD   Medication Sig Dispense Refill    albuterol (VENTOLIN HFA) 90 mcg/actuation inhaler Inhale 2 puffs into the lungs every 6 (six) hours as needed for Wheezing. Rescue 18 g 5    aspirin (ECOTRIN) 81 MG EC tablet Take 81 mg by mouth once daily.      atorvastatin (LIPITOR) 20 MG tablet Take 1 tablet (20 mg total) by mouth once daily. For CHOLESTEROL 90 tablet 1    buPROPion (WELLBUTRIN) 100 MG tablet Take 1 tablet (100 mg total) by mouth 2 (two) times daily. For MOOD 60 tablet 2    losartan (COZAAR) 100 MG tablet Take 1 tablet (100 mg total) by mouth once daily. For BLOOD PRESSURE 90 tablet 1    oxybutynin (DITROPAN) 5 MG Tab Take 5 mg by mouth every evening.      [DISCONTINUED] amLODIPine (NORVASC) 10 MG tablet Take 1 tablet (10 mg total) by mouth once daily. For BLOOD PRESSURE 90 tablet 1       Allergies  Review of patient's allergies indicates:   Allergen Reactions    Moxifloxacin Shortness Of Breath    Cefaclor Other (See Comments)     Abdominal pain       Physical Examination     Vitals:    05/05/22 1452   BP: (!) 162/76    Pulse:    Resp:      Physical Exam  Vitals and nursing note reviewed.   Constitutional:       General: He is not in acute distress.     Appearance: Normal appearance. He is not ill-appearing.   Eyes:      Extraocular Movements: Extraocular movements intact.      Pupils: Pupils are equal, round, and reactive to light.   Cardiovascular:      Rate and Rhythm: Normal rate and regular rhythm.      Heart sounds: Normal heart sounds.      Comments: Varicose veins present throughout bilateral lower extremities   Pulmonary:      Effort: Pulmonary effort is normal.      Breath sounds: Normal breath sounds.   Abdominal:      General: Bowel sounds are normal.      Palpations: Abdomen is soft.   Musculoskeletal:         General: Normal range of motion.      Right lower le+ Edema present.      Left lower le+ Edema present.   Skin:     Findings: No rash.   Neurological:      General: No focal deficit present.      Mental Status: He is alert and oriented to person, place, and time. Mental status is at baseline.   Psychiatric:         Mood and Affect: Mood normal.         Behavior: Behavior normal.          Assessment and Plan (including Health Maintenance)      Problem List  Smart Sets  Document Outside HM   :    Plan:         Health Maintenance Due   Topic Date Due    Colorectal Cancer Screening  Never done    Abdominal Aortic Aneurysm Screening  Never done    COVID-19 Vaccine (4 - Booster for Pfizer series) 2022       Problem List Items Addressed This Visit        Neuro    Idiopathic peripheral neuropathy (Chronic)    Current Assessment & Plan     Started gabapentin 100mg three times daily as needed. He is likely going to try it at night only at first.            Relevant Medications    gabapentin (NEURONTIN) 100 MG capsule       Cardiac/Vascular    Essential hypertension (Chronic)    Current Assessment & Plan      - Goal blood pressure for most patients is less than 130/85. Both numbers are important. If you  have questions about your specific goal blood pressure, please ask at your clinic visits.   - Check blood pressure outside of clinic, record the numbers, and bring the log to all your office visits.   - If you have any chest pain, SOB, or other concerning symptoms, report these immediately, or go to the nearest ER.    - Recommend cardiovascular exercise, at least 3 times per week, for at least 15 minutes.   - Eat a heart healthy diet.       We are stopping amlodipine due to lower extremity edema. Starting HCTZ 25mg. I do not suspect the amlodipine is the cause of his swelling, it is possible to add amlodipine back if his blood pressure remains uncontrolled.            Relevant Medications    hydroCHLOROthiazide (HYDRODIURIL) 25 MG tablet    Varicose veins of both lower extremities without ulcer or inflammation (Chronic)       Other    Bilateral lower extremity edema (Chronic)    Current Assessment & Plan     I suspect he has swelling due to uncotnrolled HTN and veinous insufficiency.              Other Visit Diagnoses     Screening for malignant neoplasm of colon    -  Primary    Relevant Orders    Ambulatory referral/consult to Gastroenterology        Screening for malignant neoplasm of colon  -     Ambulatory referral/consult to Gastroenterology; Future; Expected date: 05/12/2022    Essential hypertension  -     Discontinue: hydroCHLOROthiazide (HYDRODIURIL) 25 MG tablet; Take 1 tablet (25 mg total) by mouth once daily. For BLOOD PRESSURE  Dispense: 30 tablet; Refill: 2  -     hydroCHLOROthiazide (HYDRODIURIL) 25 MG tablet; Take 1 tablet (25 mg total) by mouth once daily. For BLOOD PRESSURE  Dispense: 30 tablet; Refill: 2    Idiopathic peripheral neuropathy  -     Discontinue: gabapentin (NEURONTIN) 100 MG capsule; Take 1 capsule (100 mg total) by mouth 3 (three) times daily. As needed for NERVE PAIN  Dispense: 90 capsule; Refill: 2  -     gabapentin (NEURONTIN) 100 MG capsule; Take 1 capsule (100 mg total) by  mouth 3 (three) times daily. As needed for NERVE PAIN  Dispense: 90 capsule; Refill: 2    Bilateral lower extremity edema    Varicose veins of both lower extremities without ulcer or inflammation       Health Maintenance Topics with due status: Not Due       Topic Last Completion Date    Lipid Panel 04/05/2022    Influenza Vaccine Not Due       Procedures     Future Appointments   Date Time Provider Department Center   5/19/2022  1:00 PM Joseph Alex MD [facility] [place]   10/3/2022  2:00 PM AWV NURSE, [facility] [place]        Follow up in about 2 weeks (around 5/19/2022) for hypertension, swelling, neuropathy.     Signature:  Joseph Alex MD  [address] [place] [address]  Phone #: [phone]  Fax #: [phone]    Date of encounter: 5/5/22    There are no Patient Instructions on file for this visit.

## 2022-05-05 NOTE — ASSESSMENT & PLAN NOTE
Started gabapentin 100mg three times daily as needed. He is likely going to try it at night only at first.

## 2022-05-05 NOTE — ASSESSMENT & PLAN NOTE
- Goal blood pressure for most patients is less than 130/85. Both numbers are important. If you have questions about your specific goal blood pressure, please ask at your clinic visits.   - Check blood pressure outside of clinic, record the numbers, and bring the log to all your office visits.   - If you have any chest pain, SOB, or other concerning symptoms, report these immediately, or go to the nearest ER.    - Recommend cardiovascular exercise, at least 3 times per week, for at least 15 minutes.   - Eat a heart healthy diet.       We are stopping amlodipine due to lower extremity edema. Starting HCTZ 25mg. I do not suspect the amlodipine is the cause of his swelling, it is possible to add amlodipine back if his blood pressure remains uncontrolled.

## 2022-05-19 ENCOUNTER — OFFICE VISIT (OUTPATIENT)
Dept: FAMILY MEDICINE | Facility: CLINIC | Age: 72
End: 2022-05-19
Payer: MEDICARE

## 2022-05-19 VITALS
DIASTOLIC BLOOD PRESSURE: 79 MMHG | HEIGHT: 73 IN | OXYGEN SATURATION: 98 % | BODY MASS INDEX: 31.01 KG/M2 | TEMPERATURE: 98 F | WEIGHT: 234 LBS | SYSTOLIC BLOOD PRESSURE: 137 MMHG | HEART RATE: 72 BPM | RESPIRATION RATE: 20 BRPM

## 2022-05-19 DIAGNOSIS — F17.200 TOBACCO DEPENDENCE SYNDROME: Chronic | ICD-10-CM

## 2022-05-19 DIAGNOSIS — I10 ESSENTIAL HYPERTENSION: Chronic | ICD-10-CM

## 2022-05-19 DIAGNOSIS — Z95.828 HX OF ENDOVASCULAR STENT GRAFT FOR ABDOMINAL AORTIC ANEURYSM: Chronic | ICD-10-CM

## 2022-05-19 DIAGNOSIS — E78.2 MIXED HYPERLIPIDEMIA: Chronic | ICD-10-CM

## 2022-05-19 DIAGNOSIS — Z13.6 SCREENING FOR AAA (ABDOMINAL AORTIC ANEURYSM): Primary | ICD-10-CM

## 2022-05-19 DIAGNOSIS — J41.0 SIMPLE CHRONIC BRONCHITIS: Chronic | ICD-10-CM

## 2022-05-19 DIAGNOSIS — G60.9 IDIOPATHIC PERIPHERAL NEUROPATHY: Chronic | ICD-10-CM

## 2022-05-19 DIAGNOSIS — Z87.891 HISTORY OF TOBACCO USE: ICD-10-CM

## 2022-05-19 DIAGNOSIS — Z83.49 FAMILY HISTORY OF AMYLOIDOSIS: Chronic | ICD-10-CM

## 2022-05-19 PROCEDURE — 99406 BEHAV CHNG SMOKING 3-10 MIN: CPT | Mod: ,,, | Performed by: FAMILY MEDICINE

## 2022-05-19 PROCEDURE — 99214 PR OFFICE/OUTPT VISIT, EST, LEVL IV, 30-39 MIN: ICD-10-PCS | Mod: ,,, | Performed by: FAMILY MEDICINE

## 2022-05-19 PROCEDURE — 99406 PR TOBACCO USE CESSATION INTERMEDIATE 3-10 MINUTES: ICD-10-PCS | Mod: ,,, | Performed by: FAMILY MEDICINE

## 2022-05-19 PROCEDURE — 99214 OFFICE O/P EST MOD 30 MIN: CPT | Mod: ,,, | Performed by: FAMILY MEDICINE

## 2022-05-19 NOTE — PROGRESS NOTES
Joseph Alex MD    [address] [place] [address]     PATIENT NAME: Theodore Begum  : 1950  DATE: 22  MRN: [id number]      Billing Provider: Joseph Alex MD  Level of Service:   Patient PCP Information     Provider PCP Type    Joseph Alex MD General          Reason for Visit / Chief Complaint: Follow-up (2 week f/u BP)       Update PCP  Update Chief Complaint         History of Present Illness / Problem Focused Workflow     Theodore Begum presents to the clinic with Follow-up (2 week f/u BP)     Blood pressure is much better today, not quite to goal, but definitely improved.     He is waiting on his visit with Nephrology. Family history of Amyloidosis and he is showing some signs of mild reduced renal function, similar to how his 2 brothers presented with early Amyloidosis     HPI    Review of Systems     Review of Systems   Constitutional: Negative for activity change, appetite change, chills, fatigue and fever.   HENT: Negative for nasal congestion, ear pain, hearing loss, postnasal drip and sore throat.    Respiratory: Negative for cough, chest tightness, shortness of breath and wheezing.    Cardiovascular: Negative for chest pain, palpitations, leg swelling and claudication.   Gastrointestinal: Negative for abdominal pain, change in bowel habit, constipation, diarrhea, nausea, vomiting and change in bowel habit.   Genitourinary: Negative for dysuria.   Musculoskeletal: Negative for arthralgias, back pain, gait problem and myalgias.   Integumentary:  Negative for rash.   Neurological: Negative for weakness and headaches.   Psychiatric/Behavioral: Negative for suicidal ideas. The patient is not nervous/anxious.         Medical / Social / Family History   History reviewed. No pertinent past medical history.    Past Surgical History:   Procedure Laterality Date    ABDOMINAL AORTA STENT      CHOLECYSTECTOMY      HERNIA REPAIR       PROSTATE SURGERY      REPLACEMENT OF STENT         Social History    reports that he has been smoking cigarettes. He has smoked for the past 1.00 year. He has never used smokeless tobacco. He reports that he does not drink alcohol and does not use drugs.    Family History  's family history includes No Known Problems in his father and mother.    Medications and Allergies     Medications  Outpatient Medications Marked as Taking for the 5/19/22 encounter (Office Visit) with Joseph Alex MD   Medication Sig Dispense Refill    albuterol (VENTOLIN HFA) 90 mcg/actuation inhaler Inhale 2 puffs into the lungs every 6 (six) hours as needed for Wheezing. Rescue 18 g 5    aspirin (ECOTRIN) 81 MG EC tablet Take 81 mg by mouth once daily.      atorvastatin (LIPITOR) 20 MG tablet Take 1 tablet (20 mg total) by mouth once daily. For CHOLESTEROL 90 tablet 1    buPROPion (WELLBUTRIN) 100 MG tablet Take 1 tablet (100 mg total) by mouth 2 (two) times daily. For MOOD 60 tablet 2    gabapentin (NEURONTIN) 100 MG capsule Take 1 capsule (100 mg total) by mouth 3 (three) times daily. As needed for NERVE PAIN 90 capsule 2    hydroCHLOROthiazide (HYDRODIURIL) 25 MG tablet Take 1 tablet (25 mg total) by mouth once daily. For BLOOD PRESSURE 30 tablet 2    losartan (COZAAR) 100 MG tablet Take 1 tablet (100 mg total) by mouth once daily. For BLOOD PRESSURE 90 tablet 1    oxybutynin (DITROPAN) 5 MG Tab Take 5 mg by mouth every evening.         Allergies  Review of patient's allergies indicates:   Allergen Reactions    Moxifloxacin Shortness Of Breath    Cefaclor Other (See Comments)     Abdominal pain       Physical Examination     Vitals:    05/19/22 1317   BP: 137/79   Pulse: 72   Resp: 20   Temp: 98 °F (36.7 °C)     Physical Exam  Vitals and nursing note reviewed.   Constitutional:       General: He is not in acute distress.     Appearance: Normal appearance. He is not ill-appearing.   Eyes:      Extraocular Movements:  Extraocular movements intact.      Pupils: Pupils are equal, round, and reactive to light.   Cardiovascular:      Rate and Rhythm: Normal rate and regular rhythm.      Heart sounds: Normal heart sounds.   Pulmonary:      Effort: Pulmonary effort is normal.      Breath sounds: Normal breath sounds.   Abdominal:      General: Bowel sounds are normal.      Palpations: Abdomen is soft.   Musculoskeletal:         General: Normal range of motion.   Skin:     Findings: No rash.   Neurological:      General: No focal deficit present.      Mental Status: He is alert and oriented to person, place, and time. Mental status is at baseline.   Psychiatric:         Mood and Affect: Mood normal.         Behavior: Behavior normal.          Assessment and Plan (including Health Maintenance)      Problem List  Smart Sets  Document Outside HM   :    Plan:         Health Maintenance Due   Topic Date Due    Colorectal Cancer Screening  Never done    Abdominal Aortic Aneurysm Screening  Never done       Problem List Items Addressed This Visit        Neuro    Idiopathic peripheral neuropathy (Chronic)    Current Assessment & Plan     Doing much better with neurontin              Pulmonary    Simple chronic bronchitis (Chronic)    Current Assessment & Plan     Ideally he would stop smoking              Cardiac/Vascular    Mixed hyperlipidemia (Chronic)    Essential hypertension (Chronic)    Current Assessment & Plan      - Goal blood pressure for most patients is less than 130/85. Both numbers are important. If you have questions about your specific goal blood pressure, please ask at your clinic visits.   - Check blood pressure outside of clinic, record the numbers, and bring the log to all your office visits.   - If you have any chest pain, SOB, or other concerning symptoms, report these immediately, or go to the nearest ER.    - Recommend cardiovascular exercise, at least 3 times per week, for at least 15 minutes.   - Eat a heart healthy  diet.              Hx of endovascular stent graft for abdominal aortic aneurysm (Chronic)    Current Assessment & Plan     We are ordering an image to verify patency of the stent           Relevant Orders    Radiology US Abdominal Aorta       Endocrine    Family history of amyloidosis (Chronic)    Current Assessment & Plan     He has an appointment with Nephrology in the near future               Other    Tobacco dependence syndrome (Chronic)    Relevant Orders    [60172] MI TOBACCO USE CESSATION INTERMEDIATE 3-10 MIN      Other Visit Diagnoses     Screening for AAA (abdominal aortic aneurysm)    -  Primary    History of tobacco use        Relevant Orders    [26480] MI TOBACCO USE CESSATION INTERMEDIATE 3-10 MIN        Screening for AAA (abdominal aortic aneurysm)  -     Cancel: US AAA Screening; Future; Expected date: 05/19/2022    Essential hypertension    Mixed hyperlipidemia    Family history of amyloidosis    Simple chronic bronchitis    History of tobacco use  -     [26106] MI TOBACCO USE CESSATION INTERMEDIATE 3-10 MIN    Tobacco dependence syndrome  -     [74369] MI TOBACCO USE CESSATION INTERMEDIATE 3-10 MIN    Idiopathic peripheral neuropathy    Hx of endovascular stent graft for abdominal aortic aneurysm  -     Radiology US Abdominal Aorta; Future; Expected date: 05/19/2022       Health Maintenance Topics with due status: Not Due       Topic Last Completion Date    Lipid Panel 04/05/2022    Influenza Vaccine Not Due       Procedures     Future Appointments   Date Time Provider Department Center   8/22/2022  2:00 PM Joseph Alex MD [facility]   10/3/2022  2:00 PM AWV NURSE, [facility]        Follow up in about 3 months (around 8/19/2022) for chronic health problems, hypertension.     Signature:  Joseph Alex MD  [address] [place] [address]  Phone #: [phone]  Fax #:  [phone]    Date of encounter: 5/19/22    There are no Patient Instructions on file for this visit.     Tobacco Use/Cessation:  I assessed Theodore Begum and discussed smoking cessation with him for 3-10 minutes. He is debating stopping cigarettes. He has been smoking a very long time.   Social History     Tobacco Use   Smoking Status Current Every Day Smoker    Years: 1.00    Types: Cigarettes   Smokeless Tobacco Never Used

## 2022-05-19 NOTE — ASSESSMENT & PLAN NOTE
- Goal blood pressure for most patients is less than 130/85. Both numbers are important. If you have questions about your specific goal blood pressure, please ask at your clinic visits.   - Check blood pressure outside of clinic, record the numbers, and bring the log to all your office visits.   - If you have any chest pain, SOB, or other concerning symptoms, report these immediately, or go to the nearest ER.    - Recommend cardiovascular exercise, at least 3 times per week, for at least 15 minutes.   - Eat a heart healthy diet.

## 2022-05-25 ENCOUNTER — HOSPITAL ENCOUNTER (OUTPATIENT)
Dept: RADIOLOGY | Facility: HOSPITAL | Age: 72
Discharge: HOME | End: 2022-05-25
Attending: FAMILY MEDICINE
Payer: MEDICARE

## 2022-05-25 DIAGNOSIS — Z95.828 HX OF ENDOVASCULAR STENT GRAFT FOR ABDOMINAL AORTIC ANEURYSM: ICD-10-CM

## 2022-05-25 PROCEDURE — 76775 US EXAM ABDO BACK WALL LIM: CPT | Mod: TC

## 2022-06-02 ENCOUNTER — CLINICAL SUPPORT (OUTPATIENT)
Dept: FAMILY MEDICINE | Facility: CLINIC | Age: 72
End: 2022-06-02
Payer: MEDICARE

## 2022-06-02 VITALS — DIASTOLIC BLOOD PRESSURE: 70 MMHG | SYSTOLIC BLOOD PRESSURE: 124 MMHG | HEART RATE: 73 BPM

## 2022-06-02 NOTE — PROGRESS NOTES
Here for BP check-  /70 HR 73  Pt reports had dull headache yesterday and BP per home monitor was elevated.  Denies complaints today.  Instructed to bring home BP cuff in for comparison -

## 2022-06-29 ENCOUNTER — TELEPHONE (OUTPATIENT)
Dept: FAMILY MEDICINE | Facility: CLINIC | Age: 72
End: 2022-06-29
Payer: MEDICARE

## 2022-06-29 NOTE — TELEPHONE ENCOUNTER
----- Message from Kerry Beltre sent at 6/29/2022  9:14 AM CDT -----  Pt called wondering if someone could give him a call back on what to do. He stated that he seen the cardio that Dr Alex referred for him, and that dr gave him a new med to take. He said every since he has been on it he hasn't felt right and he's been weak. He stated he wanted to talk to his PCP to make sure this wasn't interacting with his other meds. Pt # [phone]. Thanks

## 2022-06-29 NOTE — TELEPHONE ENCOUNTER
I called and spoke with pt and notified him that Dr Alex was off today but that he needed to call the cardiologist and let them know how the medicine is making him feel..  I told pt I would discuss with Dr Alex and we would call him tomorrow

## 2022-06-30 NOTE — TELEPHONE ENCOUNTER
Spoke with pt he states medication is Carvedilol. He states he left a message with Cardiology yesterday and did not get a return call. He states he is going to call again today. I told him we spoke with dr. Alex and he agreed he needed to speak to cardiologist.

## 2022-07-14 ENCOUNTER — OFFICE VISIT (OUTPATIENT)
Dept: FAMILY MEDICINE | Facility: CLINIC | Age: 72
End: 2022-07-14
Payer: MEDICARE

## 2022-07-14 VITALS
WEIGHT: 233 LBS | RESPIRATION RATE: 16 BRPM | BODY MASS INDEX: 30.88 KG/M2 | DIASTOLIC BLOOD PRESSURE: 98 MMHG | HEIGHT: 73 IN | HEART RATE: 120 BPM | TEMPERATURE: 97 F | SYSTOLIC BLOOD PRESSURE: 146 MMHG | OXYGEN SATURATION: 95 %

## 2022-07-14 DIAGNOSIS — E78.2 MIXED HYPERLIPIDEMIA: Primary | Chronic | ICD-10-CM

## 2022-07-14 DIAGNOSIS — R00.0 TACHYCARDIA: Chronic | ICD-10-CM

## 2022-07-14 DIAGNOSIS — I10 ESSENTIAL HYPERTENSION: Chronic | ICD-10-CM

## 2022-07-14 PROBLEM — I48.91 ATRIAL FIBRILLATION WITH RVR: Chronic | Status: ACTIVE | Noted: 2022-07-14

## 2022-07-14 PROCEDURE — 99214 OFFICE O/P EST MOD 30 MIN: CPT | Mod: ,,, | Performed by: FAMILY MEDICINE

## 2022-07-14 PROCEDURE — 99214 PR OFFICE/OUTPT VISIT, EST, LEVL IV, 30-39 MIN: ICD-10-PCS | Mod: ,,, | Performed by: FAMILY MEDICINE

## 2022-07-14 RX ORDER — CARVEDILOL 6.25 MG/1
6.25 TABLET ORAL 2 TIMES DAILY WITH MEALS
Qty: 60 TABLET | Refills: 2 | Status: SHIPPED | OUTPATIENT
Start: 2022-07-14 | End: 2023-01-21

## 2022-07-14 RX ORDER — APIXABAN 5 MG/1
5 TABLET, FILM COATED ORAL 2 TIMES DAILY
COMMUNITY
Start: 2022-06-27 | End: 2022-08-08 | Stop reason: ALTCHOICE

## 2022-07-14 RX ORDER — SPIRONOLACTONE 25 MG/1
25 TABLET ORAL DAILY
COMMUNITY
Start: 2022-07-08

## 2022-07-14 NOTE — ASSESSMENT & PLAN NOTE
EKG shows sinus tachycardia, with multiple abnormalities.     I am restarting him on Carvedilol, continue all other medications, hopefully able to lower his blood pressure and his pulse, he is to reach out to Cardiology and we will as well, to inform them of his symptoms and the medication changes.     He needs to follow up with Cardiology

## 2022-07-14 NOTE — PROGRESS NOTES
Joseph Alex MD    [address] [place] [address]     PATIENT NAME: Theodore Begum  : 1950  DATE: 22  MRN: [id number]      Billing Provider: Joseph Alex MD  Level of Service:   Patient PCP Information     Provider PCP Type    Joseph Alex MD General          Reason for Visit / Chief Complaint: Diarrhea and Emesis (Patient is here today for diarrhea and vomiting. Patient stated that this morning around 4 he had some vomiting and diarrhea. Patient stated that he has not had anymore vomiting or diarrhea since. Patient stated that his heart doctor put him spironolactone 25mg and his bp has been high and he is now having trouble sleeping. )       Update PCP  Update Chief Complaint         History of Present Illness / Problem Focused Workflow     Theodore Begum presents to the clinic with Diarrhea and Emesis (Patient is here today for diarrhea and vomiting. Patient stated that this morning around 4 he had some vomiting and diarrhea. Patient stated that he has not had anymore vomiting or diarrhea since. Patient stated that his heart doctor put him spironolactone 25mg and his bp has been high and he is now having trouble sleeping. )    Mr. Begum has felt unwell for the past at least one month, he was Dx with A-fib with RVR at a Cardiology visit about 3 weeks ago, he has more work up upcoming. He is seeing [facility]. 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 Strong family history of Amyloidosis, 2 brothers has passed away from it.     Pulse today is variable, but over 100 consistently, also blood pressure is elevated. In the recent past he was on Carvedilol and that was stopped due to feeling unwell, was then started on spironolactone and that has not helped the blood pressure or his feeling of weakness. Heart rate at home in the 130s, blood pressure  in the 140s/90        HPI    Review of Systems     Review of Systems   Constitutional: Negative for activity change, appetite change, chills, fatigue and fever.   HENT: Negative for nasal congestion, ear pain, hearing loss, postnasal drip and sore throat.    Respiratory: Negative for cough, chest tightness, shortness of breath and wheezing.    Cardiovascular: Negative for chest pain, palpitations, leg swelling and claudication.   Gastrointestinal: Positive for diarrhea and vomiting. Negative for abdominal pain, change in bowel habit, constipation, nausea and change in bowel habit.   Genitourinary: Negative for dysuria.   Musculoskeletal: Negative for arthralgias, back pain, gait problem and myalgias.   Integumentary:  Negative for rash.   Neurological: Negative for weakness and headaches.   Psychiatric/Behavioral: Negative for suicidal ideas. The patient is not nervous/anxious.         Medical / Social / Family History     History reviewed. No pertinent past medical history.    Past Surgical History:   Procedure Laterality Date    ABDOMINAL AORTA STENT      CHOLECYSTECTOMY      HERNIA REPAIR      PROSTATE SURGERY      REPLACEMENT OF STENT         Social History    reports that he has been smoking cigarettes. He has smoked for the past 1.00 year. He has never used smokeless tobacco. He reports that he does not drink alcohol and does not use drugs.    Family History  's family history includes No Known Problems in his father and mother.    Medications and Allergies     Medications  Outpatient Medications Marked as Taking for the 7/14/22 encounter (Office Visit) with Joseph Alex MD   Medication Sig Dispense Refill    albuterol (VENTOLIN HFA) 90 mcg/actuation inhaler Inhale 2 puffs into the lungs every 6 (six) hours as needed for Wheezing. Rescue 18 g 5    aspirin (ECOTRIN) 81 MG EC tablet Take 81 mg by mouth once daily.      atorvastatin (LIPITOR) 20 MG tablet Take 1 tablet (20 mg total) by mouth  once daily. For CHOLESTEROL 90 tablet 1    buPROPion (WELLBUTRIN) 100 MG tablet Take 1 tablet (100 mg total) by mouth 2 (two) times daily. For MOOD 60 tablet 2    ELIQUIS 5 mg Tab Take 5 mg by mouth 2 (two) times daily.      gabapentin (NEURONTIN) 100 MG capsule Take 1 capsule (100 mg total) by mouth 3 (three) times daily. As needed for NERVE PAIN 90 capsule 2    hydroCHLOROthiazide (HYDRODIURIL) 25 MG tablet Take 1 tablet (25 mg total) by mouth once daily. For BLOOD PRESSURE 30 tablet 2    losartan (COZAAR) 100 MG tablet Take 1 tablet (100 mg total) by mouth once daily. For BLOOD PRESSURE 90 tablet 1    oxybutynin (DITROPAN) 5 MG Tab Take 5 mg by mouth every evening.      spironolactone (ALDACTONE) 25 MG tablet Take 25 mg by mouth once daily.         Allergies  Review of patient's allergies indicates:   Allergen Reactions    Moxifloxacin Shortness Of Breath    Cefaclor Other (See Comments)     Abdominal pain       Physical Examination     Vitals:    07/14/22 1413   BP: (!) 146/98   Pulse: (!) 120   Resp: 16   Temp: 97 °F (36.1 °C)     Physical Exam  Vitals and nursing note reviewed.   Constitutional:       General: He is not in acute distress.     Appearance: Normal appearance. He is not ill-appearing.   Eyes:      Extraocular Movements: Extraocular movements intact.      Pupils: Pupils are equal, round, and reactive to light.   Cardiovascular:      Rate and Rhythm: Regular rhythm. Tachycardia present.      Heart sounds: Normal heart sounds.   Pulmonary:      Effort: Pulmonary effort is normal.      Breath sounds: Normal breath sounds.   Abdominal:      General: Bowel sounds are normal.      Palpations: Abdomen is soft.   Musculoskeletal:         General: Normal range of motion.   Skin:     Findings: No rash.   Neurological:      General: No focal deficit present.      Mental Status: He is alert and oriented to person, place, and time. Mental status is at baseline.   Psychiatric:         Mood and Affect:  Mood normal.         Behavior: Behavior normal.          Assessment and Plan (including Health Maintenance)      Problem List  Smart Sets  Document Outside HM   :    Plan:         Health Maintenance Due   Topic Date Due    Hepatitis C Screening  Never done    Colorectal Cancer Screening  Never done       Problem List Items Addressed This Visit        Cardiac/Vascular    Mixed hyperlipidemia - Primary (Chronic)    Essential hypertension (Chronic)    Current Assessment & Plan      - Goal blood pressure for most patients is less than 130/85. Both numbers are important. If you have questions about your specific goal blood pressure, please ask at your clinic visits.   - Check blood pressure outside of clinic, record the numbers, and bring the log to all your office visits.   - If you have any chest pain, SOB, or other concerning symptoms, report these immediately, or go to the nearest ER.    - Recommend cardiovascular exercise, at least 3 times per week, for at least 15 minutes.   - Eat a heart healthy diet.              Tachycardia    Current Assessment & Plan     EKG shows sinus tachycardia, with multiple abnormalities.     I am restarting him on Carvedilol, continue all other medications, hopefully able to lower his blood pressure and his pulse, he is to reach out to Cardiology and we will as well, to inform them of his symptoms and the medication changes.     He needs to follow up with Cardiology                Mixed hyperlipidemia    Essential hypertension    Tachycardia    Other orders  -     carvediloL (COREG) 6.25 MG tablet; Take 1 tablet (6.25 mg total) by mouth 2 (two) times daily with meals. For HEART RATE and BLOOD PRESSURE  Dispense: 60 tablet; Refill: 2       Health Maintenance Topics with due status: Not Due       Topic Last Completion Date    Lipid Panel 04/05/2022    Influenza Vaccine Not Due       Procedures     Future Appointments   Date Time Provider Department Center   8/22/2022  2:00 PM  Joseph Alex MD [facility]   10/3/2022  2:00 PM AWV NURSE, [facility] [place]        Follow up if symptoms worsen or fail to improve.     Signature:  Joseph Alex MD  [address] [place] [address]  Phone #: [phone]  Fax #: [phone]    Date of encounter: 7/14/22    There are no Patient Instructions on file for this visit.

## 2022-08-01 DIAGNOSIS — I10 ESSENTIAL HYPERTENSION: Chronic | ICD-10-CM

## 2022-08-01 RX ORDER — HYDROCHLOROTHIAZIDE 25 MG/1
25 TABLET ORAL DAILY
Qty: 90 TABLET | Refills: 1 | Status: SHIPPED | OUTPATIENT
Start: 2022-08-01 | End: 2023-01-21

## 2022-08-01 NOTE — TELEPHONE ENCOUNTER
----- Message from Vivian Maxwell sent at 8/1/2022  8:56 AM CDT -----  Please send for refill of pt's hydroCHLOROthiazide (HYDRODIURIL) 25 MG tablet to [facility] in [place],

## 2022-08-08 ENCOUNTER — OFFICE VISIT (OUTPATIENT)
Dept: FAMILY MEDICINE | Facility: CLINIC | Age: 72
End: 2022-08-08
Payer: MEDICARE

## 2022-08-08 VITALS
DIASTOLIC BLOOD PRESSURE: 76 MMHG | HEART RATE: 142 BPM | OXYGEN SATURATION: 98 % | RESPIRATION RATE: 20 BRPM | SYSTOLIC BLOOD PRESSURE: 111 MMHG | WEIGHT: 223.19 LBS | BODY MASS INDEX: 29.58 KG/M2 | TEMPERATURE: 98 F | HEIGHT: 73 IN

## 2022-08-08 DIAGNOSIS — I48.91 ATRIAL FIBRILLATION WITH RVR: Chronic | ICD-10-CM

## 2022-08-08 DIAGNOSIS — R00.0 TACHYCARDIA: ICD-10-CM

## 2022-08-08 DIAGNOSIS — R53.83 FATIGUE, UNSPECIFIED TYPE: ICD-10-CM

## 2022-08-08 DIAGNOSIS — I10 ESSENTIAL HYPERTENSION: Chronic | ICD-10-CM

## 2022-08-08 DIAGNOSIS — R94.31 ST SEGMENT DEPRESSION: Primary | ICD-10-CM

## 2022-08-08 LAB
EKG 12-LEAD: ABNORMAL
PR INTERVAL: ABNORMAL
PRT AXES: ABNORMAL
QRS DURATION: ABNORMAL
QT/QTC: ABNORMAL
VENTRICULAR RATE: ABNORMAL

## 2022-08-08 PROCEDURE — 93010 ELECTROCARDIOGRAM REPORT: CPT | Mod: ,,, | Performed by: FAMILY MEDICINE

## 2022-08-08 PROCEDURE — 99214 OFFICE O/P EST MOD 30 MIN: CPT | Mod: ,,, | Performed by: FAMILY MEDICINE

## 2022-08-08 PROCEDURE — 93010 PR ELECTROCARDIOGRAM REPORT: ICD-10-PCS | Mod: ,,, | Performed by: FAMILY MEDICINE

## 2022-08-08 PROCEDURE — 99214 PR OFFICE/OUTPT VISIT, EST, LEVL IV, 30-39 MIN: ICD-10-PCS | Mod: ,,, | Performed by: FAMILY MEDICINE

## 2022-08-08 PROCEDURE — 93005 ELECTROCARDIOGRAM TRACING: CPT | Mod: RHCUB | Performed by: FAMILY MEDICINE

## 2022-08-08 RX ORDER — ASPIRIN 325 MG
325 TABLET ORAL
Status: COMPLETED | OUTPATIENT
Start: 2022-08-08 | End: 2022-08-08

## 2022-08-08 RX ORDER — FUROSEMIDE 40 MG/1
40 TABLET ORAL EVERY MORNING
COMMUNITY
Start: 2022-07-29

## 2022-08-08 RX ORDER — METOPROLOL SUCCINATE 25 MG/1
25 TABLET, EXTENDED RELEASE ORAL DAILY
COMMUNITY
Start: 2022-07-29

## 2022-08-08 RX ADMIN — Medication 325 MG: at 10:08

## 2022-08-08 NOTE — ASSESSMENT & PLAN NOTE
Going by ambulance to the ER, patient prefers [facility] since his Cardiologist is there, possible acute MI, tachycardia with ST depression

## 2022-08-08 NOTE — PROGRESS NOTES
INT started to Left arm with 20G per CATARINA Durant RN  using aseptic technique. Flushes well- No redness or swelling noted. Secured with Op Site and tape. Pt bairon well.   1040 Left  via stretcher / Ambulance per  [facility]- to [facility] ER - Report called per Dr Alex

## 2022-08-08 NOTE — PROGRESS NOTES
Joseph Alex MD    [address] [place] [address]     PATIENT NAME: Theodore Begum  : 1950  DATE: 22  MRN: [id number]      Billing Provider: Joseph Alex MD  Level of Service:   Patient PCP Information     Provider PCP Type    Joseph Alex MD General          Reason for Visit / Chief Complaint: Fatigue       Update PCP  Update Chief Complaint         History of Present Illness / Problem Focused Workflow     Theodore Begum presents to the clinic with Fatigue     Heart rate in the 140s, feels weak and tired, short of breath with activity, this has been ongoing for weeks but seems to be getting worse. Cardiologist is at [facility]    Review of Systems     Review of Systems   Constitutional: Positive for fatigue. Negative for activity change, appetite change, chills and fever.   HENT: Negative for nasal congestion, ear pain, hearing loss, postnasal drip and sore throat.    Respiratory: Negative for cough, chest tightness, shortness of breath and wheezing.    Cardiovascular: Negative for chest pain, palpitations, leg swelling and claudication.   Gastrointestinal: Negative for abdominal pain, change in bowel habit, constipation, diarrhea, nausea, vomiting and change in bowel habit.   Genitourinary: Negative for dysuria.   Musculoskeletal: Negative for arthralgias, back pain, gait problem and myalgias.   Integumentary:  Negative for rash.   Neurological: Negative for weakness and headaches.   Psychiatric/Behavioral: Negative for suicidal ideas. The patient is not nervous/anxious.         Medical / Social / Family History     History reviewed. No pertinent past medical history.    Past Surgical History:   Procedure Laterality Date    ABDOMINAL AORTA STENT      CHOLECYSTECTOMY      HERNIA REPAIR      PROSTATE SURGERY      REPLACEMENT OF STENT         Social History    reports that he has been smoking cigarettes. He has smoked for the past  1.00 year. He has never used smokeless tobacco. He reports that he does not drink alcohol and does not use drugs.    Family History  MrJalil's family history includes No Known Problems in his father and mother.    Medications and Allergies     Medications  Outpatient Medications Marked as Taking for the 8/8/22 encounter (Office Visit) with Joseph Alex MD   Medication Sig Dispense Refill    albuterol (VENTOLIN HFA) 90 mcg/actuation inhaler Inhale 2 puffs into the lungs every 6 (six) hours as needed for Wheezing. Rescue 18 g 5    aspirin (ECOTRIN) 81 MG EC tablet Take 81 mg by mouth once daily.      carvediloL (COREG) 6.25 MG tablet Take 1 tablet (6.25 mg total) by mouth 2 (two) times daily with meals. For HEART RATE and BLOOD PRESSURE 60 tablet 2    furosemide (LASIX) 40 MG tablet Take 40 mg by mouth every morning.      gabapentin (NEURONTIN) 100 MG capsule Take 1 capsule (100 mg total) by mouth 3 (three) times daily. As needed for NERVE PAIN 90 capsule 2    hydroCHLOROthiazide (HYDRODIURIL) 25 MG tablet Take 1 tablet (25 mg total) by mouth once daily. For BLOOD PRESSURE 90 tablet 1    losartan (COZAAR) 100 MG tablet Take 1 tablet (100 mg total) by mouth once daily. For BLOOD PRESSURE 90 tablet 1    metoprolol succinate (TOPROL-XL) 25 MG 24 hr tablet Take 25 mg by mouth once daily.      oxybutynin (DITROPAN) 5 MG Tab Take 5 mg by mouth every evening.      spironolactone (ALDACTONE) 25 MG tablet Take 25 mg by mouth once daily.       Current Facility-Administered Medications for the 8/8/22 encounter (Office Visit) with Joseph Alex MD   Medication Dose Route Frequency Provider Last Rate Last Admin    aspirin tablet 325 mg  325 mg Oral 1 time in Clinic/HOD Joseph Alex MD           Allergies  Review of patient's allergies indicates:   Allergen Reactions    Moxifloxacin Shortness Of Breath    Cefaclor Other (See Comments)     Abdominal pain       Physical Examination     Vitals:    08/08/22  0946   BP: 111/76   Pulse: (!) 142   Resp: 20   Temp: 98.1 °F (36.7 °C)     Physical Exam  Vitals and nursing note reviewed.   Constitutional:       General: He is not in acute distress.     Appearance: Normal appearance. He is ill-appearing.   Eyes:      Extraocular Movements: Extraocular movements intact.      Pupils: Pupils are equal, round, and reactive to light.   Cardiovascular:      Rate and Rhythm: Regular rhythm. Tachycardia present.      Heart sounds: Normal heart sounds.   Pulmonary:      Effort: Pulmonary effort is normal.      Breath sounds: Normal breath sounds.   Abdominal:      General: Bowel sounds are normal.      Palpations: Abdomen is soft.   Musculoskeletal:         General: Normal range of motion.   Skin:     Findings: No rash.   Neurological:      General: No focal deficit present.      Mental Status: He is alert and oriented to person, place, and time. Mental status is at baseline.   Psychiatric:         Mood and Affect: Mood normal.         Behavior: Behavior normal.          Assessment and Plan (including Health Maintenance)      Problem List  Smart Sets  Document Outside HM   :    Plan:         Health Maintenance Due   Topic Date Due    Hepatitis C Screening  Never done    Colorectal Cancer Screening  Never done       Problem List Items Addressed This Visit        Cardiac/Vascular    Essential hypertension (Chronic)    Atrial fibrillation with RVR (Chronic)    Relevant Medications    aspirin tablet 325 mg    Tachycardia    ST segment depression - Primary    Current Assessment & Plan     Going by ambulance to the ER, patient prefers [facility] since his Cardiologist is there, possible acute MI, tachycardia with ST depression            Relevant Medications    aspirin tablet 325 mg      Other Visit Diagnoses     Fatigue, unspecified type            ST segment depression  -     aspirin tablet 325 mg    Atrial fibrillation with RVR  -     aspirin tablet 325 mg    Essential  hypertension    Tachycardia    Fatigue, unspecified type       Health Maintenance Topics with due status: Not Due       Topic Last Completion Date    Lipid Panel 04/05/2022    Influenza Vaccine Not Due       Procedures     Future Appointments   Date Time Provider Department Center   8/22/2022  2:00 PM Joseph Alex MD [facility] [place]   10/3/2022  2:00 PM AWV NURSE, [facility] [place]        Follow up if symptoms worsen or fail to improve.     Signature:  Joseph Alex MD  [address] [place] [address]  Phone #: [phone]  Fax #: [phone]    Date of encounter: 8/8/22    There are no Patient Instructions on file for this visit.

## 2022-08-22 ENCOUNTER — OFFICE VISIT (OUTPATIENT)
Dept: FAMILY MEDICINE | Facility: CLINIC | Age: 72
End: 2022-08-22
Payer: MEDICARE

## 2022-08-22 VITALS
DIASTOLIC BLOOD PRESSURE: 84 MMHG | HEART RATE: 113 BPM | TEMPERATURE: 98 F | SYSTOLIC BLOOD PRESSURE: 118 MMHG | WEIGHT: 220.19 LBS | BODY MASS INDEX: 29.18 KG/M2 | RESPIRATION RATE: 16 BRPM | OXYGEN SATURATION: 98 % | HEIGHT: 73 IN

## 2022-08-22 DIAGNOSIS — E85.89 OTHER AMYLOIDOSIS: Chronic | ICD-10-CM

## 2022-08-22 DIAGNOSIS — R60.0 BILATERAL LOWER EXTREMITY EDEMA: Chronic | ICD-10-CM

## 2022-08-22 DIAGNOSIS — G60.9 IDIOPATHIC PERIPHERAL NEUROPATHY: Chronic | ICD-10-CM

## 2022-08-22 DIAGNOSIS — I48.91 ATRIAL FIBRILLATION WITH RVR: Primary | Chronic | ICD-10-CM

## 2022-08-22 DIAGNOSIS — I10 ESSENTIAL HYPERTENSION: Chronic | ICD-10-CM

## 2022-08-22 DIAGNOSIS — F17.200 TOBACCO DEPENDENCE SYNDROME: Chronic | ICD-10-CM

## 2022-08-22 PROCEDURE — 99214 PR OFFICE/OUTPT VISIT, EST, LEVL IV, 30-39 MIN: ICD-10-PCS | Mod: ,,, | Performed by: FAMILY MEDICINE

## 2022-08-22 PROCEDURE — 99214 OFFICE O/P EST MOD 30 MIN: CPT | Mod: ,,, | Performed by: FAMILY MEDICINE

## 2022-08-22 RX ORDER — ATORVASTATIN CALCIUM 20 MG/1
1 TABLET, FILM COATED ORAL DAILY
COMMUNITY
Start: 2022-06-07 | End: 2023-06-07

## 2022-08-22 RX ORDER — AMIODARONE HYDROCHLORIDE 200 MG/1
200 TABLET ORAL 2 TIMES DAILY
COMMUNITY
Start: 2022-08-19 | End: 2023-01-21 | Stop reason: SDUPTHER

## 2022-08-22 RX ORDER — DAPAGLIFLOZIN 10 MG/1
10 TABLET, FILM COATED ORAL DAILY
COMMUNITY
Start: 2022-08-10

## 2022-08-22 NOTE — PROGRESS NOTES
Joseph Alex MD    [address] [place] [address]     PATIENT NAME: Theodore Begum  : 1950  DATE: 22  MRN: [id number]      Billing Provider: Joseph Alex MD  Level of Service:   Patient PCP Information     Provider PCP Type    Joseph Alex MD General          Reason for Visit / Chief Complaint: Follow-up (3 month follow up)       Update PCP  Update Chief Complaint         History of Present Illness / Problem Focused Workflow     Theodore Begum presents to the clinic with Follow-up (3 month follow up)     Since he was admitted into [facility] a couple of weeks, he has felt much better. He was in sinus rhythm when he left the hospital after 3 days and 2 nights, but now his heart rhythm is out again. His pulse rate has been better tho, not in the 140s.     Smoking 1/2 pack per day, has reduced from 1 pack per day    HPI    Review of Systems     Review of Systems   Constitutional: Negative for activity change, appetite change, chills, fatigue and fever.   HENT: Negative for nasal congestion, ear pain, hearing loss, postnasal drip and sore throat.    Respiratory: Negative for cough, chest tightness, shortness of breath and wheezing.    Cardiovascular: Negative for chest pain, palpitations, leg swelling and claudication.   Gastrointestinal: Negative for abdominal pain, change in bowel habit, constipation, diarrhea, nausea, vomiting and change in bowel habit.   Genitourinary: Negative for dysuria.   Musculoskeletal: Negative for arthralgias, back pain, gait problem and myalgias.   Integumentary:  Negative for rash.   Neurological: Negative for weakness and headaches.   Psychiatric/Behavioral: Negative for suicidal ideas. The patient is not nervous/anxious.         Medical / Social / Family History     History reviewed. No pertinent past medical history.    Past Surgical History:   Procedure Laterality Date    ABDOMINAL AORTA STENT      CHOLECYSTECTOMY       HERNIA REPAIR      PROSTATE SURGERY      REPLACEMENT OF STENT         Social History    reports that he has been smoking cigarettes. He has smoked for the past 1.00 year. He has never used smokeless tobacco. He reports that he does not drink alcohol and does not use drugs.    Family History  's family history includes No Known Problems in his father and mother.    Medications and Allergies     Medications  Outpatient Medications Marked as Taking for the 8/22/22 encounter (Office Visit) with Joseph Alex MD   Medication Sig Dispense Refill    albuterol (VENTOLIN HFA) 90 mcg/actuation inhaler Inhale 2 puffs into the lungs every 6 (six) hours as needed for Wheezing. Rescue 18 g 5    amiodarone (PACERONE) 200 MG Tab Take 200 mg by mouth 2 (two) times daily.      apixaban (ELIQUIS) 5 mg Tab Take 1 tablet by mouth 2 (two) times daily.      aspirin (ECOTRIN) 81 MG EC tablet Take 81 mg by mouth once daily.      atorvastatin (LIPITOR) 20 MG tablet Take 1 tablet by mouth once daily.      carvediloL (COREG) 6.25 MG tablet Take 1 tablet (6.25 mg total) by mouth 2 (two) times daily with meals. For HEART RATE and BLOOD PRESSURE 60 tablet 2    FARXIGA 10 mg tablet Take 10 mg by mouth once daily.      furosemide (LASIX) 40 MG tablet Take 40 mg by mouth every morning.      gabapentin (NEURONTIN) 100 MG capsule Take 1 capsule (100 mg total) by mouth 3 (three) times daily. As needed for NERVE PAIN 90 capsule 2    losartan (COZAAR) 100 MG tablet Take 1 tablet (100 mg total) by mouth once daily. For BLOOD PRESSURE 90 tablet 1    metoprolol succinate (TOPROL-XL) 25 MG 24 hr tablet Take 25 mg by mouth once daily.      oxybutynin (DITROPAN) 5 MG Tab Take 5 mg by mouth every evening.      spironolactone (ALDACTONE) 25 MG tablet Take 25 mg by mouth once daily.         Allergies  Review of patient's allergies indicates:   Allergen Reactions    Moxifloxacin Shortness Of Breath    Cefaclor Other (See Comments)      Abdominal pain       Physical Examination     Vitals:    08/22/22 1402   BP: 118/84   Pulse: (!) 113   Resp: 16   Temp: 97.8 °F (36.6 °C)     Physical Exam  Vitals and nursing note reviewed.   Constitutional:       General: He is not in acute distress.     Appearance: Normal appearance. He is not ill-appearing.   Eyes:      Extraocular Movements: Extraocular movements intact.      Pupils: Pupils are equal, round, and reactive to light.   Cardiovascular:      Rate and Rhythm: Tachycardia present. Rhythm irregularly irregular.      Heart sounds: Normal heart sounds.   Pulmonary:      Effort: Pulmonary effort is normal.      Breath sounds: Normal breath sounds.   Abdominal:      General: Bowel sounds are normal.      Palpations: Abdomen is soft.   Musculoskeletal:         General: Normal range of motion.   Skin:     Findings: No rash.   Neurological:      General: No focal deficit present.      Mental Status: He is alert and oriented to person, place, and time. Mental status is at baseline.   Psychiatric:         Mood and Affect: Mood normal.         Behavior: Behavior normal.          Assessment and Plan (including Health Maintenance)      Problem List  Smart Sets  Document Outside HM   :    Plan:         Health Maintenance Due   Topic Date Due    Hepatitis C Screening  Never done    Colorectal Cancer Screening  Never done       Problem List Items Addressed This Visit        Neuro    Idiopathic peripheral neuropathy (Chronic)       Cardiac/Vascular    Essential hypertension (Chronic)    Atrial fibrillation with RVR - Primary (Chronic)    Current Assessment & Plan     Currently in A-fib, rate of 110s.               Immunology/Multi System    Other amyloidosis (Chronic)       Other    Bilateral lower extremity edema (Chronic)    Tobacco dependence syndrome (Chronic)        Atrial fibrillation with RVR    Idiopathic peripheral neuropathy    Essential hypertension    Tobacco dependence syndrome    Bilateral lower  extremity edema    Other amyloidosis       Health Maintenance Topics with due status: Not Due       Topic Last Completion Date    Lipid Panel 04/05/2022    Influenza Vaccine Not Due       Procedures     Future Appointments   Date Time Provider Department Center   10/3/2022  2:00 PM AWV NURSE, [facility] [place]        Follow up in 3 months (on 11/22/2022), or if symptoms worsen or fail to improve, for chronic health problems.     Signature:  Joseph Alex MD  [address] [place] [address]  Phone #: [phone]  Fax #: [phone]    Date of encounter: 8/22/22    There are no Patient Instructions on file for this visit.

## 2022-09-01 ENCOUNTER — OFFICE VISIT (OUTPATIENT)
Dept: FAMILY MEDICINE | Facility: CLINIC | Age: 72
End: 2022-09-01
Payer: MEDICARE

## 2022-09-01 VITALS
RESPIRATION RATE: 16 BRPM | TEMPERATURE: 98 F | DIASTOLIC BLOOD PRESSURE: 78 MMHG | OXYGEN SATURATION: 95 % | HEART RATE: 91 BPM | WEIGHT: 224.63 LBS | BODY MASS INDEX: 29.77 KG/M2 | SYSTOLIC BLOOD PRESSURE: 121 MMHG | HEIGHT: 73 IN

## 2022-09-01 DIAGNOSIS — N48.1 BALANITIS: Primary | ICD-10-CM

## 2022-09-01 DIAGNOSIS — B36.9 DERMAL MYCOSIS: ICD-10-CM

## 2022-09-01 PROCEDURE — 99213 PR OFFICE/OUTPT VISIT, EST, LEVL III, 20-29 MIN: ICD-10-PCS | Mod: ,,, | Performed by: FAMILY MEDICINE

## 2022-09-01 PROCEDURE — 99213 OFFICE O/P EST LOW 20 MIN: CPT | Mod: ,,, | Performed by: FAMILY MEDICINE

## 2022-09-01 RX ORDER — CLOTRIMAZOLE AND BETAMETHASONE DIPROPIONATE 10; .64 MG/G; MG/G
CREAM TOPICAL
Qty: 45 G | Refills: 0 | Status: SHIPPED | OUTPATIENT
Start: 2022-09-01 | End: 2023-01-21

## 2022-09-01 RX ORDER — FLUCONAZOLE 100 MG/1
100 TABLET ORAL DAILY
Qty: 7 TABLET | Refills: 0 | Status: SHIPPED | OUTPATIENT
Start: 2022-09-01 | End: 2022-10-01

## 2022-09-01 NOTE — PROGRESS NOTES
Joseph Alex MD    [address] [place] [address]     PATIENT NAME: Theodore Begum  : 1950  DATE: 22  MRN: [id number]      Billing Provider: Joseph Alex MD  Level of Service: VT OFFICE/OUTPT VISIT, EST, LEVL III, 20-29 MIN  Patient PCP Information       Provider PCP Type    Joseph Alex MD General            Reason for Visit / Chief Complaint: Rash (On penis x 1 week.  States he has been on a antibiotic for a UTI)       Update PCP  Update Chief Complaint         History of Present Illness / Problem Focused Workflow     Theodore Begum presents to the clinic with Rash (On penis x 1 week.  States he has been on a antibiotic for a UTI)     He had a UTI, was treated with antibiotics. Then a day or so after he completed the antibiotics he developed a rash. His penis is uncircumsized and has a fair amount of skin that folds over itself.     HPI    Review of Systems     Review of Systems   Constitutional:  Negative for activity change, appetite change, chills, fatigue and fever.   HENT:  Negative for nasal congestion, ear pain, hearing loss, postnasal drip and sore throat.    Respiratory:  Negative for cough, chest tightness, shortness of breath and wheezing.    Cardiovascular:  Negative for chest pain, palpitations, leg swelling and claudication.   Gastrointestinal:  Negative for abdominal pain, change in bowel habit, constipation, diarrhea, nausea, vomiting and change in bowel habit.   Genitourinary:  Negative for dysuria.   Musculoskeletal:  Negative for arthralgias, back pain, gait problem and myalgias.   Integumentary:  Positive for rash.   Neurological:  Negative for weakness and headaches.   Psychiatric/Behavioral:  Negative for suicidal ideas. The patient is not nervous/anxious.       Medical / Social / Family History     Past Medical History:   Diagnosis Date    A-fib        Past Surgical History:   Procedure Laterality Date    ABDOMINAL  AORTA STENT      CHOLECYSTECTOMY      HERNIA REPAIR      PROSTATE SURGERY      REPLACEMENT OF STENT         Social History    reports that he has been smoking cigarettes. He has never used smokeless tobacco. He reports that he does not drink alcohol and does not use drugs.    Family History  's family history includes No Known Problems in his father and mother.    Medications and Allergies     Medications  Outpatient Medications Marked as Taking for the 9/1/22 encounter (Office Visit) with Joseph Alex MD   Medication Sig Dispense Refill    albuterol (VENTOLIN HFA) 90 mcg/actuation inhaler Inhale 2 puffs into the lungs every 6 (six) hours as needed for Wheezing. Rescue 18 g 5    amiodarone (PACERONE) 200 MG Tab Take 200 mg by mouth 2 (two) times daily.      apixaban (ELIQUIS) 5 mg Tab Take 1 tablet by mouth 2 (two) times daily.      aspirin (ECOTRIN) 81 MG EC tablet Take 81 mg by mouth once daily.      atorvastatin (LIPITOR) 20 MG tablet Take 1 tablet by mouth once daily.      carvediloL (COREG) 6.25 MG tablet Take 1 tablet (6.25 mg total) by mouth 2 (two) times daily with meals. For HEART RATE and BLOOD PRESSURE 60 tablet 2    FARXIGA 10 mg tablet Take 10 mg by mouth once daily.      furosemide (LASIX) 40 MG tablet Take 40 mg by mouth every morning.      gabapentin (NEURONTIN) 100 MG capsule Take 1 capsule (100 mg total) by mouth 3 (three) times daily. As needed for NERVE PAIN 90 capsule 2    hydroCHLOROthiazide (HYDRODIURIL) 25 MG tablet Take 1 tablet (25 mg total) by mouth once daily. For BLOOD PRESSURE 90 tablet 1    losartan (COZAAR) 100 MG tablet Take 1 tablet (100 mg total) by mouth once daily. For BLOOD PRESSURE 90 tablet 1    metoprolol succinate (TOPROL-XL) 25 MG 24 hr tablet Take 25 mg by mouth once daily.      oxybutynin (DITROPAN) 5 MG Tab Take 5 mg by mouth every evening.      spironolactone (ALDACTONE) 25 MG tablet Take 25 mg by mouth once daily.         Allergies  Review of patient's  allergies indicates:   Allergen Reactions    Moxifloxacin Shortness Of Breath    Cefaclor Other (See Comments)     Abdominal pain       Physical Examination     Vitals:    09/01/22 1420   BP: 121/78   Pulse: 91   Resp: 16   Temp: 98 °F (36.7 °C)     Physical Exam  Vitals and nursing note reviewed.   Constitutional:       General: He is not in acute distress.     Appearance: Normal appearance. He is not ill-appearing.   Eyes:      Extraocular Movements: Extraocular movements intact.      Pupils: Pupils are equal, round, and reactive to light.   Cardiovascular:      Rate and Rhythm: Normal rate and regular rhythm.      Heart sounds: Normal heart sounds.   Pulmonary:      Effort: Pulmonary effort is normal.      Breath sounds: Normal breath sounds.   Abdominal:      General: Bowel sounds are normal.      Palpations: Abdomen is soft.   Genitourinary:     Penis: Erythema and swelling present.           Comments: Uncircumcised   Musculoskeletal:         General: Normal range of motion.   Skin:     Findings: No rash.   Neurological:      General: No focal deficit present.      Mental Status: He is alert and oriented to person, place, and time. Mental status is at baseline.   Psychiatric:         Mood and Affect: Mood normal.         Behavior: Behavior normal.        Assessment and Plan (including Health Maintenance)      Problem List  Smart Sets  Document Outside HM   :    Plan:         Health Maintenance Due   Topic Date Due    Hepatitis C Screening  Never done    Colorectal Cancer Screening  Never done    Influenza Vaccine (1) Never done       Problem List Items Addressed This Visit    None  Visit Diagnoses       Balanitis    -  Primary    Dermal mycosis              Balanitis    Dermal mycosis    Other orders  -     clotrimazole-betamethasone 1-0.05% (LOTRISONE) cream; Apply a thin layer of cream to the inguinal area, twice daily as needed for RASH  Dispense: 45 g; Refill: 0  -     fluconazole (DIFLUCAN) 100 MG tablet;  Take 1 tablet (100 mg total) by mouth once daily.  Dispense: 7 tablet; Refill: 0     Health Maintenance Topics with due status: Not Due       Topic Last Completion Date    Lipid Panel 04/05/2022    High Dose Statin 08/23/2022       Procedures     Future Appointments   Date Time Provider Department Center   10/3/2022  2:00 PM AWV NURSE, [facility] [place]        Follow up if symptoms worsen or fail to improve.     Signature:  Joseph Alex MD  [address] [place] [address]  Phone #: [phone]  Fax #: [phone]    Date of encounter: 9/1/22    There are no Patient Instructions on file for this visit.

## 2022-10-03 ENCOUNTER — OFFICE VISIT (OUTPATIENT)
Dept: FAMILY MEDICINE | Facility: CLINIC | Age: 72
End: 2022-10-03
Payer: MEDICARE

## 2022-10-03 VITALS
DIASTOLIC BLOOD PRESSURE: 82 MMHG | BODY MASS INDEX: 29.34 KG/M2 | SYSTOLIC BLOOD PRESSURE: 126 MMHG | HEART RATE: 85 BPM | TEMPERATURE: 98 F | RESPIRATION RATE: 18 BRPM | OXYGEN SATURATION: 99 % | HEIGHT: 73 IN | WEIGHT: 221.38 LBS

## 2022-10-03 DIAGNOSIS — G60.9 IDIOPATHIC PERIPHERAL NEUROPATHY: Chronic | ICD-10-CM

## 2022-10-03 DIAGNOSIS — I10 ESSENTIAL HYPERTENSION: Chronic | ICD-10-CM

## 2022-10-03 DIAGNOSIS — J41.0 SIMPLE CHRONIC BRONCHITIS: Chronic | ICD-10-CM

## 2022-10-03 DIAGNOSIS — Z83.49 FAMILY HISTORY OF AMYLOIDOSIS: Chronic | ICD-10-CM

## 2022-10-03 DIAGNOSIS — I48.91 ATRIAL FIBRILLATION WITH RVR: Chronic | ICD-10-CM

## 2022-10-03 DIAGNOSIS — Z12.11 SCREENING FOR MALIGNANT NEOPLASM OF COLON: ICD-10-CM

## 2022-10-03 DIAGNOSIS — R60.0 BILATERAL LOWER EXTREMITY EDEMA: Chronic | ICD-10-CM

## 2022-10-03 DIAGNOSIS — I83.93 VARICOSE VEINS OF BOTH LOWER EXTREMITIES WITHOUT ULCER OR INFLAMMATION: Chronic | ICD-10-CM

## 2022-10-03 DIAGNOSIS — L57.0 ACTINIC KERATOSIS: Chronic | ICD-10-CM

## 2022-10-03 DIAGNOSIS — Z00.00 ENCOUNTER FOR SUBSEQUENT ANNUAL WELLNESS VISIT (AWV) IN MEDICARE PATIENT: Primary | ICD-10-CM

## 2022-10-03 DIAGNOSIS — E85.89 OTHER AMYLOIDOSIS: Chronic | ICD-10-CM

## 2022-10-03 DIAGNOSIS — F17.200 TOBACCO DEPENDENCE SYNDROME: Chronic | ICD-10-CM

## 2022-10-03 DIAGNOSIS — E78.2 MIXED HYPERLIPIDEMIA: Chronic | ICD-10-CM

## 2022-10-03 PROCEDURE — G0439 PR MEDICARE ANNUAL WELLNESS SUBSEQUENT VISIT: ICD-10-PCS | Mod: ,,, | Performed by: FAMILY MEDICINE

## 2022-10-03 PROCEDURE — G0439 PPPS, SUBSEQ VISIT: HCPCS | Mod: ,,, | Performed by: FAMILY MEDICINE

## 2022-10-03 NOTE — PROGRESS NOTES
PEREIRA ANGEL   [facility]      PATIENT NAME: Theodore Begum   : 1950    AGE: 72 y.o. DATE: 10/04/2022   MRN: [id number]        Reason for Visit / Chief Complaint: Medicare AWV (Subsequent Medicare AWV )        Theodore Begum presents for a Subsequent Medicare AWV today.     The following components were reviewed and updated:    Medical/Social/Family History:  Past Medical History:   Diagnosis Date    A-fib     Hyperlipidemia     Hypertension         Family History   Problem Relation Age of Onset    No Known Problems Mother     No Known Problems Father         Social History     Tobacco Use   Smoking Status Every Day    Packs/day: 0.50    Types: Cigarettes    Start date:    Smokeless Tobacco Never      Social History     Substance and Sexual Activity   Alcohol Use Never       Family History   Problem Relation Age of Onset    No Known Problems Mother     No Known Problems Father        Past Surgical History:   Procedure Laterality Date    ABDOMINAL AORTA STENT      CARDIOVERSION      CHOLECYSTECTOMY      HERNIA REPAIR      PROSTATE SURGERY      REPLACEMENT OF STENT           Allergies and Current Medications     Review of patient's allergies indicates:   Allergen Reactions    Moxifloxacin Shortness Of Breath    Cefaclor Other (See Comments)     Abdominal pain       Current Outpatient Medications:     albuterol (VENTOLIN HFA) 90 mcg/actuation inhaler, Inhale 2 puffs into the lungs every 6 (six) hours as needed for Wheezing. Rescue, Disp: 18 g, Rfl: 5    amiodarone (PACERONE) 200 MG Tab, Take 200 mg by mouth 2 (two) times daily., Disp: , Rfl:     apixaban (ELIQUIS) 5 mg Tab, Take 1 tablet by mouth 2 (two) times daily., Disp: , Rfl:     aspirin (ECOTRIN) 81 MG EC tablet, Take 81 mg by mouth once daily., Disp: , Rfl:     atorvastatin (LIPITOR) 20 MG tablet, Take 1 tablet by mouth once daily., Disp: , Rfl:     carvediloL (COREG) 6.25 MG tablet, Take 1 tablet (6.25 mg total) by mouth 2  (two) times daily with meals. For HEART RATE and BLOOD PRESSURE, Disp: 60 tablet, Rfl: 2    clotrimazole-betamethasone 1-0.05% (LOTRISONE) cream, Apply a thin layer of cream to the inguinal area, twice daily as needed for RASH, Disp: 45 g, Rfl: 0    FARXIGA 10 mg tablet, Take 10 mg by mouth once daily., Disp: , Rfl:     furosemide (LASIX) 40 MG tablet, Take 40 mg by mouth every morning., Disp: , Rfl:     gabapentin (NEURONTIN) 100 MG capsule, Take 1 capsule (100 mg total) by mouth 3 (three) times daily. As needed for NERVE PAIN, Disp: 90 capsule, Rfl: 2    hydroCHLOROthiazide (HYDRODIURIL) 25 MG tablet, Take 1 tablet (25 mg total) by mouth once daily. For BLOOD PRESSURE, Disp: 90 tablet, Rfl: 1    losartan (COZAAR) 100 MG tablet, TAKE 1 TABLET(100 MG) BY MOUTH EVERY DAY FOR BLOOD PRESSURE, Disp: 90 tablet, Rfl: 1    metoprolol succinate (TOPROL-XL) 25 MG 24 hr tablet, Take 25 mg by mouth once daily., Disp: , Rfl:     oxybutynin (DITROPAN) 5 MG Tab, Take 5 mg by mouth every evening., Disp: , Rfl:     spironolactone (ALDACTONE) 25 MG tablet, Take 25 mg by mouth once daily., Disp: , Rfl:     Health Risk Assessment   Fall Risk: No   Advance Directive:  Does not have an advanced directive. Verbal education and written education included in today's AVS.   Depression: PHQ9 score: 1    HTN: DASH diet, exercise, weight management, med compliance, home BP monitoring, and follow-up discussed.   T2DM: diabetic diet, glucose monitoring, activity level, weight management, med compliance, and follow-up discussed.   Tobacco use: current. 0.5 ppd  STI: Not at risk    Alcohol misuse: No   Statin Use: Yes    Opioid Risk Score         Value Time User    Opioid Risk Score  0 10/3/2022  2:24 PM Rossana Hernandez RN               Health Risk Assessment  What [age] 70-79  Are you male or female?: Male  During the past four weeks, how much have you been bothered by emotional problems such as feeling anxious, depressed, irritable,  sad, or downhearted and blue?: Not at all  During the past five weeks, has your physical and/or emotional health limited your social activities with family, friends, neighbors, or groups?: Not at all  During the past four weeks, how much bodily pain have you generally had?: Very mild pain  During the past four weeks, was someone available to help if you needed and wanted help?: Yes, as much as I wanted  During the past four weeks, what was the hardest physical activity you could do for at least two minutes?: Light  Can you get to places out of walking distance without help?  (For example, can you travel alone on buses or taxis, or drive your own car?): Yes  Can you go shopping for groceries or clothes without someone's help?: Yes  Can you prepare your own meals?: Yes  Can you do your own housework without help?: Yes  Because of any health problems, do you need the help of another person with your personal care needs such as eating, bathing, dressing, or getting around the house?: No  Can you handle your own money without help?: Yes  During the past four weeks, how would you rate your health in general?: Fair  How have things been going for you during the past four weeks?: Pretty well  Are you having difficulties driving your car?: No  Do you always fasten your seat belt when you are in a car?: Yes, usually  How often in the past four weeks have you been bothered by falling or dizzy when standing up?: Never  How often in the past four weeks have you been bothered by sexual problems?: Never  How often in the past four weeks have you been bothered by trouble eating well?: Never  How often in the past four weeks have you been bothered by teeth or denture problems?: Never  How often in the past four weeks have you been bothered with problems using the telephone?: Never  How often in the past four weeks have you been bothered by tiredness or fatigue?: Sometimes  Have you fallen two or more times in the past year?: No  Are  you afraid of falling?: No  Are you a smoker?: Yes, I might quit  During the past four weeks, how many drinks of wine, beer, or other alcoholic beverages did you have?: No alcohol at all  Do you exercise for about 20 minutes three or more days a week?: Yes, some of the time  Have you been given any information to help you with hazards in your house that might hurt you?: Yes  Have you been given any information to help you with keeping track of your medications?: Yes  How often do you have trouble taking medicines the way you've been told to take them?: I always take them as prescribed  How confident are you that you can control and manage most of your health problems?: Very confident  What is your race? (Check all that apply.):     Health Maintenance       Health Maintenance Topics with due status: Not Due       Topic Last Completion Date    Diabetes Urine Screening 04/05/2022    Lipid Panel 04/05/2022    Hemoglobin A1c 08/09/2022    High Dose Statin 10/03/2022     Health Maintenance Due   Topic Date Due    Hepatitis C Screening  Never done    Colorectal Cancer Screening  10/28/2019       Incontinence  Bowel: No  Bladder: No    Lab results available in Epic or see dates from [facility] above:   Lab Results   Component Value Date    CHOL 113 04/05/2022    CHOL 183 08/24/2021     Lab Results   Component Value Date    HDL 44 04/05/2022    HDL 42 08/24/2021     Lab Results   Component Value Date    LDLCALC 45 04/05/2022    LDLCALC 122 08/24/2021     Lab Results   Component Value Date    TRIG 121 04/05/2022    TRIG 95 08/24/2021     Lab Results   Component Value Date    CHOLHDL 2.6 04/05/2022    CHOLHDL 4.4 08/24/2021       No results found for: LABA1C, HGBA1C    Sodium   Date Value Ref Range Status   04/05/2022 143 136 - 145 mmol/L Final     Potassium   Date Value Ref Range Status   04/05/2022 4.0 3.5 - 5.1 mmol/L Final     Chloride   Date Value Ref Range Status   04/05/2022 106 98 - 107 mmol/L Final     CO2   Date  "Value Ref Range Status   04/05/2022 34 (H) 21 - 32 mmol/L Final     Glucose   Date Value Ref Range Status   04/05/2022 108 (H) 74 - 106 mg/dL Final     BUN   Date Value Ref Range Status   04/05/2022 22 (H) 7 - 18 mg/dL Final     Creatinine   Date Value Ref Range Status   04/05/2022 1.48 (H) 0.70 - 1.30 mg/dL Final     Calcium   Date Value Ref Range Status   04/05/2022 9.1 8.5 - 10.1 mg/dL Final     Anion Gap   Date Value Ref Range Status   04/05/2022 7 7 - 16 mmol/L Final     eGFR   Date Value Ref Range Status   04/05/2022 50 (L) >=60 mL/min/1.73m² Final         No results found for: PSA (Delete this line if female pt)        Care Team   PCP:         **See Completed Assessments for Annual Wellness visit within the encounter summary    The following assessments were completed & reviewed:  Depression Screening  Cognitive function Screening  Timed Get Up Test  Whisper Test  Vision Screen  Health Risk Assessment  Checklist of ADLs and IADLs      Objective  Vitals:    10/03/22 1408   BP: 126/82   Pulse: 85   Resp: 18   Temp: 97.5 °F (36.4 °C)   TempSrc: Oral   SpO2: 99%   Weight: 100.4 kg (221 lb 6.4 oz)   Height: 6' 1" (1.854 m)   PainSc: 0-No pain      Body mass index is 29.21 kg/m².  Ideal body weight: 79.9 kg (176 lb 2.4 oz)       Physical Exam  Vitals and nursing note reviewed.   Constitutional:       General: He is not in acute distress.     Appearance: Normal appearance. He is not ill-appearing.   Eyes:      Extraocular Movements: Extraocular movements intact.      Pupils: Pupils are equal, round, and reactive to light.   Cardiovascular:      Rate and Rhythm: Normal rate and regular rhythm.      Heart sounds: Normal heart sounds.   Pulmonary:      Effort: Pulmonary effort is normal.      Breath sounds: Normal breath sounds.   Abdominal:      General: Bowel sounds are normal.      Palpations: Abdomen is soft.   Musculoskeletal:         General: Normal range of motion.   Skin:     Findings: No rash. "   Neurological:      General: No focal deficit present.      Mental Status: He is alert and oriented to person, place, and time. Mental status is at baseline.   Psychiatric:         Mood and Affect: Mood normal.         Behavior: Behavior normal.       Assessment:     1. Encounter for subsequent annual wellness visit (AWV) in Medicare patient    2. BMI 29.0-29.9,adult    3. Screening for malignant neoplasm of colon  - Ambulatory referral/consult to Gastroenterology; Future    4. Idiopathic peripheral neuropathy    5. Actinic keratosis    6. Simple chronic bronchitis    7. Varicose veins of both lower extremities without ulcer or inflammation    8. Mixed hyperlipidemia    9. Essential hypertension    10. Atrial fibrillation with RVR    11. Other amyloidosis    12. Family history of amyloidosis    13. Tobacco dependence syndrome    14. Bilateral lower extremity edema         Plan:    Referrals:       Advised to call office if does not hear from anyone with referral appt within 2-3 weeks to check on status of referral. Voiced understanding.      Discussed and provided with a screening schedule and personal prevention plan in accordance with USPSTF age appropriate recommendations and Medicare screening guidelines.   Education, counseling, and referrals were provided as needed.  After Visit Summary printed and given to patient which includes written education and a list of any referrals if indicated.     Assistance with smoking cessation was offered, including:  []  Medications  [x]  Counseling  [x]  Printed Information on Smoking Cessation  []  Referral to a Smoking Cessation Program    Patient was counseled regarding smoking for 3-10 minutes.     Education including advanced directives, diet, exercise, falls, and preventive health discussed with patient and patient verbalized understanding.      F/u plan for yearly AWV.    Signature:  Joseph Alex MD

## 2022-10-03 NOTE — PATIENT INSTRUCTIONS
Counseling and Referral of Other Preventative  (Italic type indicates deductible and co-insurance are waived)    Patient Name: Theodore Begum  Today's Date: 10/3/2022    Health Maintenance         Date Due Completion Date    Colorectal Cancer Screening 10/28/2019 10/28/2016    Hepatitis C Screening 10/03/2022 (Originally 1950) ---    Influenza Vaccine (1) 10/19/2022 (Originally 9/1/2022) ---    PROSTATE-SPECIFIC ANTIGEN 11/23/2022 (Originally 1950) ---    Shingles Vaccine (1 of 2) 04/05/2023 (Originally 4/29/2001) ---    TETANUS VACCINE 05/05/2023 (Originally 4/29/1969) ---    Pneumococcal Vaccines (Age 65+) (2 - PCV) 05/05/2023 (Originally 1/1/2015) 1/1/2014    COVID-19 Vaccine (4 - Booster for Pfizer series) 05/19/2023 (Originally 1/8/2022) 11/13/2021    Hemoglobin A1c 02/09/2023 8/9/2022    Diabetes Urine Screening 04/05/2023 4/5/2022    Lipid Panel 04/05/2023 4/5/2022    High Dose Statin 09/01/2023 9/1/2022          Orders Placed This Encounter   Procedures    Ambulatory referral/consult to Gastroenterology

## 2022-10-04 PROBLEM — R94.31 ST SEGMENT DEPRESSION: Chronic | Status: ACTIVE | Noted: 2022-08-08

## 2022-10-04 PROBLEM — R00.0 TACHYCARDIA: Chronic | Status: ACTIVE | Noted: 2022-07-14

## 2022-10-09 DIAGNOSIS — Z71.89 COMPLEX CARE COORDINATION: ICD-10-CM

## 2022-10-20 ENCOUNTER — OFFICE VISIT (OUTPATIENT)
Dept: FAMILY MEDICINE | Facility: CLINIC | Age: 72
End: 2022-10-20
Payer: MEDICARE

## 2022-10-20 VITALS
WEIGHT: 209 LBS | HEART RATE: 54 BPM | TEMPERATURE: 98 F | HEIGHT: 73 IN | OXYGEN SATURATION: 96 % | DIASTOLIC BLOOD PRESSURE: 75 MMHG | SYSTOLIC BLOOD PRESSURE: 139 MMHG | BODY MASS INDEX: 27.7 KG/M2 | RESPIRATION RATE: 16 BRPM

## 2022-10-20 DIAGNOSIS — L89.892 PRESSURE INJURY OF TOE OF RIGHT FOOT, STAGE 2: Primary | ICD-10-CM

## 2022-10-20 DIAGNOSIS — G60.9 IDIOPATHIC PERIPHERAL NEUROPATHY: Chronic | ICD-10-CM

## 2022-10-20 PROCEDURE — 99214 PR OFFICE/OUTPT VISIT, EST, LEVL IV, 30-39 MIN: ICD-10-PCS | Mod: ,,, | Performed by: FAMILY MEDICINE

## 2022-10-20 PROCEDURE — 99214 OFFICE O/P EST MOD 30 MIN: CPT | Mod: ,,, | Performed by: FAMILY MEDICINE

## 2022-10-20 RX ORDER — GABAPENTIN 300 MG/1
300 CAPSULE ORAL 3 TIMES DAILY
Qty: 270 CAPSULE | Refills: 1 | Status: SHIPPED | OUTPATIENT
Start: 2022-10-20 | End: 2023-05-05 | Stop reason: SDUPTHER

## 2022-10-20 NOTE — PROGRESS NOTES
Joseph Alex MD    [address] [place] [address]     PATIENT NAME: Theodore Begum  : 1950  DATE: 10/20/22  MRN: [id number]      Billing Provider: Joseph Alex MD  Level of Service: [place] OFFICE/OUTPT VISIT, EST, LEVL IV, 30-39 MIN  Patient PCP Information       Provider PCP Type    Joseph Alex MD General            Reason for Visit / Chief Complaint: Toe Pain (Right foot last toe pain and blister x 1 month. In between last toe and and second to last toe.)       Update PCP  Update Chief Complaint         History of Present Illness / Problem Focused Workflow     Theodore Begum presents to the clinic with Toe Pain (Right foot last toe pain and blister x 1 month. In between last toe and and second to last toe.)     No recent trauma to the toe, but he does have hammer toes and these two toes stay close to each other most of the time. No drainage or erythema in the toes.     Reports neuropathy, gabapentin 100mg TID helps some but is not working well enough     HPI    Review of Systems     Review of Systems   Constitutional:  Negative for activity change, appetite change, chills, fatigue and fever.   HENT:  Negative for nasal congestion, ear pain, hearing loss, postnasal drip and sore throat.    Respiratory:  Negative for cough, chest tightness, shortness of breath and wheezing.    Cardiovascular:  Negative for chest pain, palpitations, leg swelling and claudication.   Gastrointestinal:  Negative for abdominal pain, change in bowel habit, constipation, diarrhea, nausea, vomiting and change in bowel habit.   Genitourinary:  Negative for dysuria.   Musculoskeletal:  Negative for arthralgias, back pain, gait problem and myalgias.   Integumentary:  Negative for rash.   Neurological:  Negative for weakness and headaches.   Psychiatric/Behavioral:  Negative for suicidal ideas. The patient is not nervous/anxious.       Medical / Social / Family History     Past  Medical History:   Diagnosis Date    A-fib     Hyperlipidemia     Hypertension        Past Surgical History:   Procedure Laterality Date    ABDOMINAL AORTA STENT      CARDIOVERSION      CHOLECYSTECTOMY      HERNIA REPAIR      PROSTATE SURGERY      REPLACEMENT OF STENT         Social History    reports that he has been smoking cigarettes. He started smoking about 52 years ago. He has been smoking an average of .5 packs per day. He has never used smokeless tobacco. He reports that he does not drink alcohol and does not use drugs.    Family History  's family history includes No Known Problems in his father and mother.    Medications and Allergies     Medications  Outpatient Medications Marked as Taking for the 10/20/22 encounter (Office Visit) with Joseph Alex MD   Medication Sig Dispense Refill    albuterol (VENTOLIN HFA) 90 mcg/actuation inhaler Inhale 2 puffs into the lungs every 6 (six) hours as needed for Wheezing. Rescue 18 g 5    amiodarone (PACERONE) 200 MG Tab Take 200 mg by mouth 2 (two) times daily.      apixaban (ELIQUIS) 5 mg Tab Take 1 tablet by mouth 2 (two) times daily.      aspirin (ECOTRIN) 81 MG EC tablet Take 81 mg by mouth once daily.      atorvastatin (LIPITOR) 20 MG tablet Take 1 tablet by mouth once daily.      carvediloL (COREG) 6.25 MG tablet Take 1 tablet (6.25 mg total) by mouth 2 (two) times daily with meals. For HEART RATE and BLOOD PRESSURE 60 tablet 2    clotrimazole-betamethasone 1-0.05% (LOTRISONE) cream Apply a thin layer of cream to the inguinal area, twice daily as needed for RASH 45 g 0    FARXIGA 10 mg tablet Take 10 mg by mouth once daily.      furosemide (LASIX) 40 MG tablet Take 40 mg by mouth every morning.      hydroCHLOROthiazide (HYDRODIURIL) 25 MG tablet Take 1 tablet (25 mg total) by mouth once daily. For BLOOD PRESSURE 90 tablet 1    losartan (COZAAR) 100 MG tablet TAKE 1 TABLET(100 MG) BY MOUTH EVERY DAY FOR BLOOD PRESSURE 90 tablet 1    metoprolol  succinate (TOPROL-XL) 25 MG 24 hr tablet Take 25 mg by mouth once daily.      spironolactone (ALDACTONE) 25 MG tablet Take 25 mg by mouth once daily.      [DISCONTINUED] gabapentin (NEURONTIN) 100 MG capsule Take 1 capsule (100 mg total) by mouth 3 (three) times daily. As needed for NERVE PAIN 90 capsule 2       Allergies  Review of patient's allergies indicates:   Allergen Reactions    Moxifloxacin Shortness Of Breath    Cefaclor Other (See Comments)     Abdominal pain       Physical Examination     Vitals:    10/20/22 0825   BP: 139/75   Pulse: (!) 54   Resp: 16   Temp: 98 °F (36.7 °C)     Physical Exam  Vitals and nursing note reviewed.   Constitutional:       General: He is not in acute distress.     Appearance: Normal appearance. He is not ill-appearing.   Eyes:      Extraocular Movements: Extraocular movements intact.      Pupils: Pupils are equal, round, and reactive to light.   Cardiovascular:      Rate and Rhythm: Normal rate and regular rhythm.      Heart sounds: Normal heart sounds.   Pulmonary:      Effort: Pulmonary effort is normal.      Breath sounds: Normal breath sounds.   Abdominal:      General: Bowel sounds are normal.      Palpations: Abdomen is soft.   Musculoskeletal:         General: Normal range of motion.        Feet:    Feet:      Right foot:      Skin integrity: Callus present.      Comments: Medial surface of phalange 5, small area of tenderness with skin color changes     Hammers toes, phalange 3-5 on the right   Skin:     Findings: No rash.   Neurological:      General: No focal deficit present.      Mental Status: He is alert and oriented to person, place, and time. Mental status is at baseline.   Psychiatric:         Mood and Affect: Mood normal.         Behavior: Behavior normal.          Assessment and Plan (including Health Maintenance)      Problem List  Smart Sets  Document Outside HM   :    Plan:         Health Maintenance Due   Topic Date Due    Colorectal Cancer Screening   10/28/2019       Problem List Items Addressed This Visit          Neuro    Idiopathic peripheral neuropathy (Chronic)    Relevant Medications    gabapentin (NEURONTIN) 300 MG capsule       Orthopedic    Pressure injury of toe of right foot, stage 2 - Primary    Current Assessment & Plan     Use toe spacers to relieve the pressure points           Pressure injury of toe of right foot, stage 2    Idiopathic peripheral neuropathy  -     gabapentin (NEURONTIN) 300 MG capsule; Take 1 capsule (300 mg total) by mouth 3 (three) times daily. For NERVE PAIN  Dispense: 270 capsule; Refill: 1     Health Maintenance Topics with due status: Not Due       Topic Last Completion Date    Diabetes Urine Screening 04/05/2022    Lipid Panel 04/05/2022    Hemoglobin A1c 08/09/2022    High Dose Statin 10/20/2022       Procedures     Future Appointments   Date Time Provider Department Center   10/2/2023  2:00 PM AWV NURSE, [facility] [place]        Follow up if symptoms worsen or fail to improve.     Signature:  Joseph Alex MD  [address] [place] [address]  Phone #: [phone]  Fax #: [phone]    Date of encounter: 10/20/22    There are no Patient Instructions on file for this visit.

## 2022-12-30 ENCOUNTER — OFFICE VISIT (OUTPATIENT)
Dept: FAMILY MEDICINE | Facility: CLINIC | Age: 72
End: 2022-12-30
Payer: MEDICARE

## 2022-12-30 ENCOUNTER — HOSPITAL ENCOUNTER (OUTPATIENT)
Dept: RADIOLOGY | Facility: HOSPITAL | Age: 72
Discharge: HOME | End: 2022-12-30
Attending: NURSE PRACTITIONER
Payer: MEDICARE

## 2022-12-30 VITALS
WEIGHT: 209 LBS | TEMPERATURE: 99 F | OXYGEN SATURATION: 95 % | SYSTOLIC BLOOD PRESSURE: 138 MMHG | HEIGHT: 73 IN | DIASTOLIC BLOOD PRESSURE: 79 MMHG | BODY MASS INDEX: 27.7 KG/M2 | RESPIRATION RATE: 20 BRPM | HEART RATE: 65 BPM

## 2022-12-30 DIAGNOSIS — J06.9 UPPER RESPIRATORY TRACT INFECTION, UNSPECIFIED TYPE: Primary | ICD-10-CM

## 2022-12-30 DIAGNOSIS — K21.9 GASTROESOPHAGEAL REFLUX DISEASE WITHOUT ESOPHAGITIS: ICD-10-CM

## 2022-12-30 DIAGNOSIS — R05.2 SUBACUTE COUGH: ICD-10-CM

## 2022-12-30 PROCEDURE — 96372 PR INJECTION,THERAP/PROPH/DIAG2ST, IM OR SUBCUT: ICD-10-PCS | Mod: ,,, | Performed by: NURSE PRACTITIONER

## 2022-12-30 PROCEDURE — 93010 PR ELECTROCARDIOGRAM REPORT: ICD-10-PCS | Mod: ,,, | Performed by: NURSE PRACTITIONER

## 2022-12-30 PROCEDURE — 71046 X-RAY EXAM CHEST 2 VIEWS: CPT | Mod: TC,PN

## 2022-12-30 PROCEDURE — 93010 ELECTROCARDIOGRAM REPORT: CPT | Mod: ,,, | Performed by: NURSE PRACTITIONER

## 2022-12-30 PROCEDURE — 99214 OFFICE O/P EST MOD 30 MIN: CPT | Mod: ,,, | Performed by: NURSE PRACTITIONER

## 2022-12-30 PROCEDURE — 99214 PR OFFICE/OUTPT VISIT, EST, LEVL IV, 30-39 MIN: ICD-10-PCS | Mod: ,,, | Performed by: NURSE PRACTITIONER

## 2022-12-30 PROCEDURE — 93005 ELECTROCARDIOGRAM TRACING: CPT | Mod: RHCUB | Performed by: NURSE PRACTITIONER

## 2022-12-30 PROCEDURE — 96372 THER/PROPH/DIAG INJ SC/IM: CPT | Mod: ,,, | Performed by: NURSE PRACTITIONER

## 2022-12-30 RX ORDER — ROSUVASTATIN CALCIUM 20 MG/1
20 TABLET, COATED ORAL
COMMUNITY
Start: 2022-11-04 | End: 2023-05-05 | Stop reason: SDUPTHER

## 2022-12-30 RX ORDER — PANTOPRAZOLE SODIUM 40 MG/1
40 TABLET, DELAYED RELEASE ORAL
COMMUNITY
Start: 2022-10-05 | End: 2023-01-18

## 2022-12-30 RX ORDER — CEFTRIAXONE 1 G/1
1 INJECTION, POWDER, FOR SOLUTION INTRAMUSCULAR; INTRAVENOUS
Status: COMPLETED | OUTPATIENT
Start: 2022-12-30 | End: 2022-12-30

## 2022-12-30 RX ORDER — OXYBUTYNIN CHLORIDE 10 MG/1
10 TABLET, EXTENDED RELEASE ORAL
COMMUNITY
Start: 2022-12-07 | End: 2024-01-29

## 2022-12-30 RX ORDER — AZITHROMYCIN 250 MG/1
TABLET, FILM COATED ORAL
Qty: 6 TABLET | Refills: 0 | Status: SHIPPED | OUTPATIENT
Start: 2022-12-30 | End: 2023-01-21

## 2022-12-30 RX ORDER — METOPROLOL SUCCINATE 50 MG/1
50 TABLET, EXTENDED RELEASE ORAL
COMMUNITY
Start: 2022-11-15 | End: 2024-01-29

## 2022-12-30 RX ORDER — AMIODARONE HYDROCHLORIDE 200 MG/1
0.5 TABLET ORAL DAILY
COMMUNITY
Start: 2022-11-15 | End: 2024-03-25

## 2022-12-30 RX ADMIN — CEFTRIAXONE 1 G: 1 INJECTION, POWDER, FOR SOLUTION INTRAMUSCULAR; INTRAVENOUS at 02:12

## 2022-12-30 NOTE — PATIENT INSTRUCTIONS
Recommend prilosec 2 times a day for 3 days, then resume daily. Will prophylactic treat for aspiration.

## 2022-12-30 NOTE — PROGRESS NOTES
Jailyn Mulligan DNP, EMANI    [address] [place] [address]     PATIENT NAME: Theodore Begum  : 1950  DATE: 22  MRN: [id number]      Billing Provider: Jailyn Mulligan DNP, FNP  Level of Service:   Patient PCP Information       Provider PCP Type    Joseph Alex MD General            Reason for Visit / Chief Complaint: Cough, Gastroesophageal Reflux (Symptoms started this am. Complain of productive cough, yellow/white thick sputum. Complains of esophageal burning, and complains of right lung burning when he coughs. ), and Nasal Congestion       Update PCP  Update Chief Complaint         History of Present Illness / Problem Focused Workflow     Theodore Begum presents to the clinic with Cough, Gastroesophageal Reflux (Symptoms started this am. Complain of productive cough, yellow/white thick sputum. Complains of esophageal burning, and complains of right lung burning when he coughs. ), and Nasal Congestion     Hx GERD--takes prilosec daily--ate powell and egg sandwich before bed last pm.     Review of Systems     Review of Systems   Constitutional:  Negative for activity change and appetite change.   HENT:  Negative for dental problem, ear pain, sinus pressure/congestion and sore throat.    Respiratory:  Positive for cough. Negative for chest tightness and shortness of breath.    Cardiovascular:  Negative for chest pain and palpitations.   Gastrointestinal:  Positive for reflux. Negative for abdominal pain.   Genitourinary:  Negative for bladder incontinence and dysuria.   Musculoskeletal:  Negative for arthralgias.   Integumentary:  Negative for rash.   Neurological:  Negative for dizziness, weakness and numbness.      Medical / Social / Family History     Past Medical History:   Diagnosis Date    A-fib     Hyperlipidemia     Hypertension        Past Surgical History:   Procedure Laterality Date    ABDOMINAL AORTA STENT      CARDIOVERSION      CHOLECYSTECTOMY       HERNIA REPAIR      PROSTATE SURGERY      REPLACEMENT OF STENT         Social History  Mr. Theodore Begum  reports that he has been smoking cigarettes. He started smoking about 53 years ago. He has a 7.50 pack-year smoking history. He has never used smokeless tobacco. He reports that he does not drink alcohol and does not use drugs.    Family History  Mr. Theodore Begum's family history includes No Known Problems in his father and mother.    Medications and Allergies     Medications  Outpatient Medications Marked as Taking for the 12/30/22 encounter (Office Visit) with Jailyn Mulligan DNP, FNP   Medication Sig Dispense Refill    albuterol (VENTOLIN HFA) 90 mcg/actuation inhaler Inhale 2 puffs into the lungs every 6 (six) hours as needed for Wheezing. Rescue 18 g 5    amiodarone (PACERONE) 200 MG Tab Take 200 mg by mouth 2 (two) times daily.      apixaban (ELIQUIS) 5 mg Tab Take 1 tablet by mouth 2 (two) times daily.      aspirin (ECOTRIN) 81 MG EC tablet Take 81 mg by mouth once daily.      FARXIGA 10 mg tablet Take 10 mg by mouth once daily.      furosemide (LASIX) 40 MG tablet Take 40 mg by mouth every morning.      gabapentin (NEURONTIN) 300 MG capsule Take 1 capsule (300 mg total) by mouth 3 (three) times daily. For NERVE PAIN 270 capsule 1    losartan (COZAAR) 100 MG tablet TAKE 1 TABLET(100 MG) BY MOUTH EVERY DAY FOR BLOOD PRESSURE 90 tablet 1    metoprolol succinate (TOPROL-XL) 50 MG 24 hr tablet Take 50 mg by mouth.      oxybutynin (DITROPAN-XL) 10 MG 24 hr tablet Take 10 mg by mouth.      pantoprazole (PROTONIX) 40 MG tablet Take 40 mg by mouth.      rosuvastatin (CRESTOR) 20 MG tablet Take 20 mg by mouth.       Current Facility-Administered Medications for the 12/30/22 encounter (Office Visit) with Jailyn Mulligan DNP, FNP   Medication Dose Route Frequency Provider Last Rate Last Admin    [COMPLETED] cefTRIAXone injection 1 g  1 g Intramuscular 1 time in Clinic/HOD Jailyn Mulligan DNP, FNP   1 g at 12/30/22 4057  "      Allergies  Review of patient's allergies indicates:   Allergen Reactions    Moxifloxacin Shortness Of Breath    Cefaclor Other (See Comments)     Abdominal pain       Physical Examination     Vitals:    12/30/22 1328   BP: 138/79   Pulse: 65   Resp: 20   Temp: 98.6 °F (37 °C)   TempSrc: Oral   SpO2: 95%   Weight: 94.8 kg (209 lb)   Height: 6' 1" (1.854 m)     Physical Exam  Vitals and nursing note reviewed.   Constitutional:       General: He is not in acute distress.     Appearance: He is normal weight.   HENT:      Mouth/Throat:      Mouth: Mucous membranes are moist.   Eyes:      Pupils: Pupils are equal, round, and reactive to light.   Cardiovascular:      Rate and Rhythm: Normal rate and regular rhythm.      Pulses: Normal pulses.      Heart sounds: No murmur heard.  Pulmonary:      Effort: Pulmonary effort is normal. No respiratory distress.      Breath sounds: Rhonchi present. No wheezing or rales.   Chest:      Chest wall: No tenderness.   Abdominal:      General: Bowel sounds are normal. There is no distension.      Palpations: Abdomen is soft.      Tenderness: There is no abdominal tenderness. There is no right CVA tenderness, left CVA tenderness, guarding or rebound.   Musculoskeletal:         General: No swelling or tenderness. Normal range of motion.      Cervical back: Normal range of motion and neck supple.      Right lower leg: No edema.      Left lower leg: No edema.   Skin:     General: Skin is warm and dry.   Neurological:      General: No focal deficit present.      Mental Status: He is alert and oriented to person, place, and time.   Psychiatric:         Mood and Affect: Mood normal.         Behavior: Behavior normal.         Thought Content: Thought content normal.         Judgment: Judgment normal.        Assessment and Plan (including Health Maintenance)      Problem List  Smart Sets  Document Outside HM   :    Plan:   With pt's cardiac history, recommend pt be evaluated for cardiac at " ED and pt refused. No acute changes noted in EKG. Instructed pt if symptoms worsen, go to nearest ED. Pt states symptoms have improved since early this am.     Health Maintenance Due   Topic Date Due    PROSTATE-SPECIFIC ANTIGEN  Never done    Hepatitis C Screening  Never done    Colorectal Cancer Screening  10/28/2019       Problem List Items Addressed This Visit    None  Visit Diagnoses       Upper respiratory tract infection, unspecified type    -  Primary    Relevant Medications    cefTRIAXone injection 1 g (Completed)    azithromycin (ZITHROMAX Z-JOSE) 250 MG tablet    Subacute cough        Relevant Orders    X-Ray Chest PA And Lateral    Gastroesophageal reflux disease without esophagitis              Upper respiratory tract infection, unspecified type  -     cefTRIAXone injection 1 g  -     azithromycin (ZITHROMAX Z-JOSE) 250 MG tablet; Take 2 tablets on Day 1 and 1 tablet daily for next 4 days.  Dispense: 6 tablet; Refill: 0    Subacute cough  -     X-Ray Chest PA And Lateral; Future; Expected date: 12/30/2022    Gastroesophageal reflux disease without esophagitis       Health Maintenance Topics with due status: Not Due       Topic Last Completion Date    Diabetes Urine Screening 04/05/2022    Lipid Panel 04/05/2022    Hemoglobin A1c 08/09/2022    High Dose Statin 12/30/2022           Future Appointments   Date Time Provider Department Center   10/2/2023  2:00 PM AWV NURSE, [facility] [place]        Follow up if symptoms worsen or fail to improve.     Signature:  Jailyn Mulligan DNP, FNP  [address] [place] [address]  Phone #: [phone]  Fax #: [phone]    Date of encounter: 12/30/22    Patient Instructions   Recommend prilosec 2 times a day for 3 days, then resume daily. Will prophylactic treat for aspiration.

## 2023-01-18 ENCOUNTER — OFFICE VISIT (OUTPATIENT)
Dept: FAMILY MEDICINE | Facility: CLINIC | Age: 73
End: 2023-01-18

## 2023-01-18 VITALS
BODY MASS INDEX: 28.49 KG/M2 | HEART RATE: 46 BPM | RESPIRATION RATE: 20 BRPM | HEIGHT: 73 IN | OXYGEN SATURATION: 95 % | DIASTOLIC BLOOD PRESSURE: 72 MMHG | SYSTOLIC BLOOD PRESSURE: 134 MMHG | WEIGHT: 215 LBS | TEMPERATURE: 100 F

## 2023-01-18 DIAGNOSIS — Z02.89 ENCOUNTER FOR OTHER ADMINISTRATIVE EXAMINATIONS: Primary | ICD-10-CM

## 2023-01-18 PROCEDURE — 99499 PR PHYSICAL - DOT/CDL: ICD-10-PCS | Mod: ,,, | Performed by: NURSE PRACTITIONER

## 2023-01-18 PROCEDURE — 99499 UNLISTED E&M SERVICE: CPT | Mod: ,,, | Performed by: NURSE PRACTITIONER

## 2023-01-18 RX ORDER — OMEPRAZOLE 20 MG/1
1 CAPSULE, DELAYED RELEASE ORAL DAILY
COMMUNITY

## 2023-01-18 NOTE — PROGRESS NOTES
Jailyn Mulligan DNP, EMANI    [address] [place] [address]     PATIENT NAME: Theodore Begum  : 1950  DATE: 23  MRN: [id number]      Billing Provider: Jailyn Mulligan DNP, FNP  Level of Service:   Patient PCP Information       Provider PCP Type    Joseph Alex MD General            Reason for Visit / Chief Complaint: Employment Physical (For DOT)       Update PCP  Update Chief Complaint         History of Present Illness / Problem Focused Workflow     Theodore Begum presents to the clinic with Employment Physical (For DOT)         Review of Systems     Review of Systems   Constitutional:  Negative for activity change, appetite change, chills, fatigue and fever.   HENT:  Negative for nasal congestion, ear pain, hearing loss, postnasal drip and sore throat.    Respiratory:  Negative for cough, chest tightness, shortness of breath and wheezing.    Cardiovascular:  Negative for chest pain, palpitations, leg swelling and claudication.   Gastrointestinal:  Negative for abdominal pain, change in bowel habit, constipation, diarrhea, nausea, vomiting and change in bowel habit.   Genitourinary:  Negative for dysuria.   Musculoskeletal:  Negative for arthralgias, back pain, gait problem and myalgias.   Integumentary:  Negative for rash.   Neurological:  Negative for weakness and headaches.   Psychiatric/Behavioral:  Negative for suicidal ideas. The patient is not nervous/anxious.       Medical / Social / Family History     Past Medical History:   Diagnosis Date    A-fib     Hyperlipidemia     Hypertension        Past Surgical History:   Procedure Laterality Date    ABDOMINAL AORTA STENT      CARDIOVERSION      CHOLECYSTECTOMY      HERNIA REPAIR      PROSTATE SURGERY      REPLACEMENT OF STENT         Social History  Mr. Theodore Begum  reports that he has been smoking cigarettes. He started smoking about 53 years ago. He has a 7.50 pack-year smoking history. He has never used  "smokeless tobacco. He reports that he does not drink alcohol and does not use drugs.    Family History  Mr. Theodore Begum's family history includes No Known Problems in his father and mother.    Medications and Allergies     Medications  Outpatient Medications Marked as Taking for the 1/18/23 encounter (Office Visit) with Jailyn Mulligan, MARCOS, FNP   Medication Sig Dispense Refill    albuterol (VENTOLIN HFA) 90 mcg/actuation inhaler Inhale 2 puffs into the lungs every 6 (six) hours as needed for Wheezing. Rescue 18 g 5    amiodarone (PACERONE) 200 MG Tab Take 1 tablet by mouth once daily.      apixaban (ELIQUIS) 5 mg Tab Take 1 tablet by mouth 2 (two) times daily.      aspirin (ECOTRIN) 81 MG EC tablet Take 81 mg by mouth once daily.      atorvastatin (LIPITOR) 20 MG tablet Take 1 tablet by mouth once daily.      FARXIGA 10 mg tablet Take 10 mg by mouth once daily.      furosemide (LASIX) 40 MG tablet Take 40 mg by mouth every morning.      gabapentin (NEURONTIN) 300 MG capsule Take 1 capsule (300 mg total) by mouth 3 (three) times daily. For NERVE PAIN 270 capsule 1    losartan (COZAAR) 100 MG tablet TAKE 1 TABLET(100 MG) BY MOUTH EVERY DAY FOR BLOOD PRESSURE 90 tablet 1    omeprazole (PRILOSEC) 20 MG capsule Take 1 capsule by mouth once daily.      oxybutynin (DITROPAN-XL) 10 MG 24 hr tablet Take 10 mg by mouth.      rosuvastatin (CRESTOR) 20 MG tablet Take 20 mg by mouth.      spironolactone (ALDACTONE) 25 MG tablet Take 25 mg by mouth once daily.         Allergies  Review of patient's allergies indicates:   Allergen Reactions    Moxifloxacin Shortness Of Breath    Cefaclor Other (See Comments)     Abdominal pain       Physical Examination     Vitals:    01/18/23 1601   BP: 134/72   BP Location: Right arm   Patient Position: Sitting   BP Method: Medium (Automatic)   Pulse: (!) 46   Resp: 20   Temp: 99.7 °F (37.6 °C)   TempSrc: Oral   SpO2: 95%   Weight: 97.5 kg (215 lb)   Height: 6' 1" (1.854 m)     Physical " Exam  Vitals and nursing note reviewed.   Constitutional:       General: He is not in acute distress.     Appearance: He is normal weight.   HENT:      Mouth/Throat:      Mouth: Mucous membranes are moist.   Eyes:      Pupils: Pupils are equal, round, and reactive to light.   Cardiovascular:      Rate and Rhythm: Bradycardia present. Rhythm irregular.      Pulses: Normal pulses.      Heart sounds: No murmur heard.  Pulmonary:      Effort: Pulmonary effort is normal. No respiratory distress.      Breath sounds: Rhonchi (scattered) present. No wheezing or rales.   Chest:      Chest wall: No tenderness.   Abdominal:      General: Bowel sounds are normal. There is no distension.      Palpations: Abdomen is soft.      Tenderness: There is no abdominal tenderness. There is no right CVA tenderness, left CVA tenderness, guarding or rebound.   Musculoskeletal:         General: No swelling or tenderness. Normal range of motion.      Cervical back: Normal range of motion and neck supple.      Right lower leg: No edema.      Left lower leg: No edema.   Skin:     General: Skin is warm and dry.   Neurological:      General: No focal deficit present.      Mental Status: He is alert and oriented to person, place, and time.   Psychiatric:         Mood and Affect: Mood normal.         Behavior: Behavior normal.         Thought Content: Thought content normal.         Judgment: Judgment normal.        Assessment and Plan (including Health Maintenance)      Problem List  Smart Sets  Document Outside HM   :    Plan:         Health Maintenance Due   Topic Date Due    PROSTATE-SPECIFIC ANTIGEN  Never done    Hepatitis C Screening  Never done    Colorectal Cancer Screening  10/28/2019       Problem List Items Addressed This Visit    None  Visit Diagnoses       Encounter for other administrative examinations    -  Primary          Encounter for other administrative examinations       Health Maintenance Topics with due status: Not Due        Topic Last Completion Date    Diabetes Urine Screening 04/05/2022    Lipid Panel 04/05/2022    Hemoglobin A1c 08/09/2022    High Dose Statin 12/30/2022         Future Appointments   Date Time Provider Department Center   10/2/2023  2:00 PM AWV NURSE, [facility] [place]        Follow up in about 1 year (around 1/18/2024).     Signature:  Jailyn Mulligan DNP, FNP  [address] [place] [address]  Phone #: [phone]  Fax #: [phone]    Date of encounter: 1/18/23    Patient Instructions   Follow up in 1 year for DOT.

## 2023-05-05 ENCOUNTER — OFFICE VISIT (OUTPATIENT)
Dept: FAMILY MEDICINE | Facility: CLINIC | Age: 73
End: 2023-05-05
Payer: MEDICARE

## 2023-05-05 VITALS
DIASTOLIC BLOOD PRESSURE: 78 MMHG | HEIGHT: 73 IN | SYSTOLIC BLOOD PRESSURE: 174 MMHG | HEART RATE: 42 BPM | WEIGHT: 213 LBS | BODY MASS INDEX: 28.23 KG/M2 | RESPIRATION RATE: 20 BRPM | OXYGEN SATURATION: 99 %

## 2023-05-05 DIAGNOSIS — Z23 NEED FOR SHINGLES VACCINE: ICD-10-CM

## 2023-05-05 DIAGNOSIS — I71.40 ABDOMINAL AORTIC ANEURYSM (AAA) WITHOUT RUPTURE, UNSPECIFIED PART: Chronic | ICD-10-CM

## 2023-05-05 DIAGNOSIS — J41.0 SIMPLE CHRONIC BRONCHITIS: Chronic | ICD-10-CM

## 2023-05-05 DIAGNOSIS — N18.31 STAGE 3A CHRONIC KIDNEY DISEASE: Chronic | ICD-10-CM

## 2023-05-05 DIAGNOSIS — I48.91 ATRIAL FIBRILLATION WITH RVR: Chronic | ICD-10-CM

## 2023-05-05 DIAGNOSIS — C44.629 SQUAMOUS CELL CARCINOMA OF ARM, LEFT: Chronic | ICD-10-CM

## 2023-05-05 DIAGNOSIS — C61 MALIGNANT NEOPLASM OF PROSTATE: Chronic | ICD-10-CM

## 2023-05-05 DIAGNOSIS — E78.2 MIXED HYPERLIPIDEMIA: Chronic | ICD-10-CM

## 2023-05-05 DIAGNOSIS — Z12.11 COLON CANCER SCREENING: ICD-10-CM

## 2023-05-05 DIAGNOSIS — I10 ESSENTIAL HYPERTENSION: Chronic | ICD-10-CM

## 2023-05-05 DIAGNOSIS — E11.59 TYPE 2 DIABETES MELLITUS WITH OTHER CIRCULATORY COMPLICATION, WITHOUT LONG-TERM CURRENT USE OF INSULIN: Primary | Chronic | ICD-10-CM

## 2023-05-05 DIAGNOSIS — E85.89 OTHER AMYLOIDOSIS: Chronic | ICD-10-CM

## 2023-05-05 DIAGNOSIS — G60.9 IDIOPATHIC PERIPHERAL NEUROPATHY: Chronic | ICD-10-CM

## 2023-05-05 DIAGNOSIS — Z11.59 NEED FOR HEPATITIS C SCREENING TEST: ICD-10-CM

## 2023-05-05 DIAGNOSIS — Z12.5 SCREENING FOR MALIGNANT NEOPLASM OF PROSTATE: ICD-10-CM

## 2023-05-05 PROBLEM — L89.892 PRESSURE INJURY OF TOE OF RIGHT FOOT, STAGE 2: Chronic | Status: ACTIVE | Noted: 2022-10-20

## 2023-05-05 LAB
BASOPHILS # BLD AUTO: 0.02 K/UL (ref 0–0.2)
BASOPHILS NFR BLD AUTO: 0.3 % (ref 0–1)
BILIRUB UR QL STRIP: NEGATIVE
CHOLEST SERPL-MCNC: 96 MG/DL (ref 0–200)
CHOLEST/HDLC SERPL: 2.3 {RATIO}
CLARITY UR: CLEAR
COLOR UR: YELLOW
CREAT UR-MCNC: 76 MG/DL (ref 39–259)
DIFFERENTIAL METHOD BLD: ABNORMAL
EOSINOPHIL # BLD AUTO: 0.1 K/UL (ref 0–0.5)
EOSINOPHIL NFR BLD AUTO: 1.6 % (ref 1–4)
ERYTHROCYTE [DISTWIDTH] IN BLOOD BY AUTOMATED COUNT: 17.8 % (ref 11.5–14.5)
EST. AVERAGE GLUCOSE BLD GHB EST-MCNC: 107 MG/DL
GLUCOSE UR STRIP-MCNC: 1000 MG/DL
HBA1C MFR BLD HPLC: 5.8 % (ref 4.5–6.6)
HCT VFR BLD AUTO: 41.4 % (ref 40–54)
HDLC SERPL-MCNC: 42 MG/DL (ref 40–60)
HGB BLD-MCNC: 12.2 G/DL (ref 13.5–18)
IMM GRANULOCYTES # BLD AUTO: 0.02 K/UL (ref 0–0.04)
IMM GRANULOCYTES NFR BLD: 0.3 % (ref 0–0.4)
KETONES UR STRIP-SCNC: NEGATIVE MG/DL
LDLC SERPL CALC-MCNC: 41 MG/DL
LDLC/HDLC SERPL: 1 {RATIO}
LEUKOCYTE ESTERASE UR QL STRIP: NEGATIVE
LYMPHOCYTES # BLD AUTO: 1.21 K/UL (ref 1–4.8)
LYMPHOCYTES NFR BLD AUTO: 19.3 % (ref 27–41)
MCH RBC QN AUTO: 26.5 PG (ref 27–31)
MCHC RBC AUTO-ENTMCNC: 29.5 G/DL (ref 32–36)
MCV RBC AUTO: 90 FL (ref 80–96)
MICROALBUMIN UR-MCNC: 11.1 MG/DL (ref 0–2.8)
MICROALBUMIN/CREAT RATIO PNL UR: 146.1 MG/G (ref 0–30)
MONOCYTES # BLD AUTO: 0.53 K/UL (ref 0–0.8)
MONOCYTES NFR BLD AUTO: 8.5 % (ref 2–6)
MPC BLD CALC-MCNC: 11.4 FL (ref 9.4–12.4)
MUCOUS, UA: ABNORMAL /LPF
NEUTROPHILS # BLD AUTO: 4.38 K/UL (ref 1.8–7.7)
NEUTROPHILS NFR BLD AUTO: 70 % (ref 53–65)
NITRITE UR QL STRIP: NEGATIVE
NONHDLC SERPL-MCNC: 54 MG/DL
NRBC # BLD AUTO: 0 X10E3/UL
NRBC, AUTO (.00): 0 %
PH UR STRIP: 6.5 PH UNITS
PLATELET # BLD AUTO: 129 K/UL (ref 150–400)
PROT UR QL STRIP: 30
PSA SERPL-MCNC: <0.01 NG/ML
RBC # BLD AUTO: 4.6 M/UL (ref 4.6–6.2)
RBC # UR STRIP: ABNORMAL /UL
RBC #/AREA URNS HPF: 2 /HPF
SP GR UR STRIP: 1.01
TRIGL SERPL-MCNC: 67 MG/DL (ref 35–150)
UROBILINOGEN UR STRIP-ACNC: 2 MG/DL
VLDLC SERPL-MCNC: 13 MG/DL
WBC # BLD AUTO: 6.26 K/UL (ref 4.5–11)
WBC #/AREA URNS HPF: 1 /HPF

## 2023-05-05 PROCEDURE — 82043 MICROALBUMIN / CREATININE RATIO URINE: ICD-10-PCS | Mod: ,,, | Performed by: CLINICAL MEDICAL LABORATORY

## 2023-05-05 PROCEDURE — G0103 PSA SCREENING: HCPCS | Mod: ,,, | Performed by: CLINICAL MEDICAL LABORATORY

## 2023-05-05 PROCEDURE — 83036 HEMOGLOBIN A1C: ICD-10-PCS | Mod: ,,, | Performed by: CLINICAL MEDICAL LABORATORY

## 2023-05-05 PROCEDURE — 85025 COMPLETE CBC W/AUTO DIFF WBC: CPT | Mod: ,,, | Performed by: CLINICAL MEDICAL LABORATORY

## 2023-05-05 PROCEDURE — 85025 CBC WITH DIFFERENTIAL: ICD-10-PCS | Mod: ,,, | Performed by: CLINICAL MEDICAL LABORATORY

## 2023-05-05 PROCEDURE — 82570 MICROALBUMIN / CREATININE RATIO URINE: ICD-10-PCS | Mod: ,,, | Performed by: CLINICAL MEDICAL LABORATORY

## 2023-05-05 PROCEDURE — 82043 UR ALBUMIN QUANTITATIVE: CPT | Mod: ,,, | Performed by: CLINICAL MEDICAL LABORATORY

## 2023-05-05 PROCEDURE — 99214 OFFICE O/P EST MOD 30 MIN: CPT | Mod: ,,, | Performed by: FAMILY MEDICINE

## 2023-05-05 PROCEDURE — G0103 PSA, SCREENING: ICD-10-PCS | Mod: ,,, | Performed by: CLINICAL MEDICAL LABORATORY

## 2023-05-05 PROCEDURE — 99214 PR OFFICE/OUTPT VISIT, EST, LEVL IV, 30-39 MIN: ICD-10-PCS | Mod: ,,, | Performed by: FAMILY MEDICINE

## 2023-05-05 PROCEDURE — 81001 URINALYSIS AUTO W/SCOPE: CPT | Mod: ,,, | Performed by: CLINICAL MEDICAL LABORATORY

## 2023-05-05 PROCEDURE — 82570 ASSAY OF URINE CREATININE: CPT | Mod: ,,, | Performed by: CLINICAL MEDICAL LABORATORY

## 2023-05-05 PROCEDURE — 83036 HEMOGLOBIN GLYCOSYLATED A1C: CPT | Mod: ,,, | Performed by: CLINICAL MEDICAL LABORATORY

## 2023-05-05 PROCEDURE — 81001 URINALYSIS, REFLEX TO URINE CULTURE: ICD-10-PCS | Mod: ,,, | Performed by: CLINICAL MEDICAL LABORATORY

## 2023-05-05 PROCEDURE — 80061 LIPID PANEL: ICD-10-PCS | Mod: ,,, | Performed by: CLINICAL MEDICAL LABORATORY

## 2023-05-05 PROCEDURE — 80061 LIPID PANEL: CPT | Mod: ,,, | Performed by: CLINICAL MEDICAL LABORATORY

## 2023-05-05 RX ORDER — GABAPENTIN 400 MG/1
400 CAPSULE ORAL 3 TIMES DAILY
Qty: 270 CAPSULE | Refills: 1 | Status: SHIPPED | OUTPATIENT
Start: 2023-05-05 | End: 2023-12-08

## 2023-05-05 RX ORDER — ROSUVASTATIN CALCIUM 20 MG/1
20 TABLET, COATED ORAL DAILY
Qty: 90 TABLET | Refills: 1 | Status: SHIPPED | OUTPATIENT
Start: 2023-05-05 | End: 2023-11-02

## 2023-05-05 RX ORDER — GABAPENTIN 300 MG/1
300 CAPSULE ORAL 3 TIMES DAILY
Qty: 270 CAPSULE | Refills: 1 | Status: SHIPPED | OUTPATIENT
Start: 2023-05-05 | End: 2023-05-05

## 2023-05-05 NOTE — PROGRESS NOTES
Joseph Alex MD    [address] [place] [address]     PATIENT NAME: Theodore Begum  : 1950  DATE: 23  MRN: [id number]      Billing Provider: Joseph Alex MD  Level of Service: [place] OFFICE/OUTPT VISIT, EST, LEVL IV, 30-39 MIN  Patient PCP Information       Provider PCP Type    Joseph Alex MD General            Reason for Visit / Chief Complaint: Follow-up (States he would like to discuss a referral to dermatology regarding a skin lesion on his left forearm. ), Medication Refill (Here today for medication refill. ), and Colonoscopy (Discussed the need for routine colonoscopy screening, patient refused. He states Dr. Kent would not clear him last year and he does not want to do this screening now. )       Update PCP  Update Chief Complaint         History of Present Illness / Problem Focused Workflow     Theodore Begum presents to the clinic with Follow-up (States he would like to discuss a referral to dermatology regarding a skin lesion on his left forearm. ), Medication Refill (Here today for medication refill. ), and Colonoscopy (Discussed the need for routine colonoscopy screening, patient refused. He states Dr. Kent would not clear him last year and he does not want to do this screening now. )     He has felt fairly well recently. Blood pressure is elevated today    HPI    Review of Systems     Review of Systems   Constitutional:  Negative for activity change, appetite change, chills, fatigue and fever.   HENT:  Negative for nasal congestion, ear pain, hearing loss, postnasal drip and sore throat.    Respiratory:  Negative for cough, chest tightness, shortness of breath and wheezing.    Cardiovascular:  Negative for chest pain, palpitations, leg swelling and claudication.   Gastrointestinal:  Negative for abdominal pain, change in bowel habit, constipation, diarrhea, nausea, vomiting and change in bowel habit.   Genitourinary:   Negative for dysuria.   Musculoskeletal:  Negative for arthralgias, back pain, gait problem and myalgias.   Integumentary:  Positive for rash.   Neurological:  Negative for weakness and headaches.   Psychiatric/Behavioral:  Negative for suicidal ideas. The patient is not nervous/anxious.       Medical / Social / Family History     Past Medical History:   Diagnosis Date    A-fib     Hyperlipidemia     Hypertension        Past Surgical History:   Procedure Laterality Date    ABDOMINAL AORTA STENT      CARDIOVERSION      CHOLECYSTECTOMY      HERNIA REPAIR      PROSTATE SURGERY      REPLACEMENT OF STENT         Social History    reports that he has been smoking cigarettes. He started smoking about 53 years ago. He has a 7.50 pack-year smoking history. He has been exposed to tobacco smoke. He has never used smokeless tobacco. He reports that he does not drink alcohol and does not use drugs.    Family History  's family history includes No Known Problems in his father and mother.    Medications and Allergies     Medications  Outpatient Medications Marked as Taking for the 5/5/23 encounter (Office Visit) with Joseph Alex MD   Medication Sig Dispense Refill    albuterol (VENTOLIN HFA) 90 mcg/actuation inhaler Inhale 2 puffs into the lungs every 6 (six) hours as needed for Wheezing. Rescue 18 g 5    amiodarone (PACERONE) 200 MG Tab Take 1 tablet by mouth once daily.      apixaban (ELIQUIS) 5 mg Tab Take 1 tablet by mouth 2 (two) times daily.      aspirin (ECOTRIN) 81 MG EC tablet Take 81 mg by mouth once daily.      FARXIGA 10 mg tablet Take 10 mg by mouth once daily.      furosemide (LASIX) 40 MG tablet Take 40 mg by mouth every morning.      losartan (COZAAR) 100 MG tablet TAKE 1 TABLET(100 MG) BY MOUTH EVERY DAY FOR BLOOD PRESSURE 90 tablet 1    metoprolol succinate (TOPROL-XL) 50 MG 24 hr tablet Take 50 mg by mouth.      omeprazole (PRILOSEC) 20 MG capsule Take 1 capsule by mouth once daily.      oxybutynin  "(DITROPAN-XL) 10 MG 24 hr tablet Take 10 mg by mouth.      spironolactone (ALDACTONE) 25 MG tablet Take 25 mg by mouth once daily.      [DISCONTINUED] gabapentin (NEURONTIN) 300 MG capsule Take 1 capsule (300 mg total) by mouth 3 (three) times daily. For NERVE PAIN 270 capsule 1    [DISCONTINUED] rosuvastatin (CRESTOR) 20 MG tablet Take 20 mg by mouth.         Allergies  Review of patient's allergies indicates:   Allergen Reactions    Moxifloxacin Shortness Of Breath    Cefaclor Other (See Comments)     Abdominal pain       Physical Examination     Vitals:    05/05/23 1024   BP: (!) 174/78   Pulse: (!) 42   Resp:      Vitals:    05/05/23 1011 05/05/23 1024   BP: (!) 170/71 (!) 174/78   Pulse: (!) 44 (!) 42   Resp: 20    SpO2: 99%    Weight: 96.6 kg (213 lb)    Height: 6' 1" (1.854 m)       Physical Exam  Vitals and nursing note reviewed.   Constitutional:       General: He is not in acute distress.     Appearance: Normal appearance. He is not ill-appearing.   Eyes:      Extraocular Movements: Extraocular movements intact.      Pupils: Pupils are equal, round, and reactive to light.   Cardiovascular:      Rate and Rhythm: Normal rate and regular rhythm.      Heart sounds: Normal heart sounds.   Pulmonary:      Effort: Pulmonary effort is normal.      Breath sounds: Normal breath sounds.   Abdominal:      General: Bowel sounds are normal.      Palpations: Abdomen is soft.   Musculoskeletal:         General: Normal range of motion.   Skin:     Findings: No rash.             Comments: Erosion, left forearm, no active drainage    Neurological:      General: No focal deficit present.      Mental Status: He is alert and oriented to person, place, and time. Mental status is at baseline.   Psychiatric:         Mood and Affect: Mood normal.         Behavior: Behavior normal.        Image from 05/05/2023. Left forearm, chronic erosion        Assessment and Plan (including Health Maintenance)      Problem List  Smart Sets  " Document Outside HM   :    Plan:   He has felt fairly well. Systolic blood pressure today is elevated, pulse is 42 as well.     He continues to see multiple specialists, Cardiology, Electrophysiology, Nephrology.     No changes made in clinic today. Labs ordered.         Health Maintenance Due   Topic Date Due    Hepatitis C Screening  Never done    TETANUS VACCINE  Never done    Shingles Vaccine (1 of 2) Never done    Colorectal Cancer Screening  10/28/2019       Problem List Items Addressed This Visit          Neuro    Idiopathic peripheral neuropathy (Chronic)    Relevant Medications    gabapentin (NEURONTIN) 400 MG capsule       Pulmonary    Simple chronic bronchitis (Chronic)       Cardiac/Vascular    Mixed hyperlipidemia (Chronic)    Relevant Medications    rosuvastatin (CRESTOR) 20 MG tablet    Essential hypertension (Chronic)    Relevant Orders    Microalbumin/Creatinine Ratio, Urine (Completed)    CBC Auto Differential (Completed)    Lipid Panel (Completed)    Urinalysis, Reflex to Urine Culture (Completed)    Urinalysis, Microscopic (Completed)    Atrial fibrillation with RVR (Chronic)       Renal/    Stage 3a chronic kidney disease (Chronic)       Immunology/Multi System    Other amyloidosis (Chronic)       Oncology    Malignant neoplasm of prostate (Chronic)    Squamous cell carcinoma of arm, left (Chronic)    Current Assessment & Plan     Refer to Dr. Colunga for evaluation of skin lesion on the left forearm           Relevant Orders    Ambulatory referral/consult to Dermatology       Endocrine    Type 2 diabetes mellitus with other circulatory complication, without long-term current use of insulin - Primary (Chronic)    Current Assessment & Plan      - Check glucose outside of clinic, record numbers, and bring log to follow up visit.    - Take medications as directed, and bring all medications to every clinic appointment.    - Eat a diabetic diet, if there are concerns about what that entails, we have a  diabetic educator in our clinic.    - Cardiovascular exercise at least 3 times per week, for at least 15 minutes.    - Patient should be on a Statin medication, unless patient cannot tolerate a Statin.    - Aspirin should be taken everyday,  81mg, unless a higher dose is indicated for other medicaiton conditions. Also do not recommend Aspirin for a patient with known adverse effects from Aspirin.    - Recommend yearly, dilated eye exams for all Diabetic Patients.    - Monitor feet for calluses, abrasion, or other abnormalities. Report any concerns at every clinic visit.    - No results found for: HGBA1C            Relevant Orders    Hemoglobin A1C (Completed)       Other    Abdominal aortic aneurysm (AAA) without rupture, unspecified part (Chronic)     Other Visit Diagnoses       Screening for malignant neoplasm of prostate        Relevant Orders    PSA, Screening (Completed)    Colon cancer screening        Need for shingles vaccine        Need for hepatitis C screening test                Health Maintenance Topics with due status: Not Due       Topic Last Completion Date    PROSTATE-SPECIFIC ANTIGEN 05/05/2023    High Dose Statin 05/05/2023    Diabetes Urine Screening 05/05/2023    Lipid Panel 05/05/2023    Hemoglobin A1c 05/05/2023       Procedures     Future Appointments   Date Time Provider Department Center   6/20/2023  3:00 PM Vicky Colunga MD [facility] DER[facility] [place]   10/2/2023  2:00 PM AWV NURSE, [facility] [place]        Follow up in about 3 months (around 8/5/2023) for chronic health problems.     Signature:  Joseph Alex MD  [address] [place] [address]  Phone #: [phone]  Fax #: [phone]    Date of encounter: 5/5/23    There are no Patient Instructions on file for this visit.

## 2023-05-09 DIAGNOSIS — Z71.89 COMPLEX CARE COORDINATION: ICD-10-CM

## 2023-05-23 ENCOUNTER — TELEPHONE (OUTPATIENT)
Dept: FAMILY MEDICINE | Facility: CLINIC | Age: 73
End: 2023-05-23
Payer: MEDICARE

## 2023-05-23 VITALS — DIASTOLIC BLOOD PRESSURE: 67 MMHG | SYSTOLIC BLOOD PRESSURE: 133 MMHG

## 2023-06-19 ENCOUNTER — HOSPITAL ENCOUNTER (OUTPATIENT)
Dept: RADIOLOGY | Facility: HOSPITAL | Age: 73
Discharge: HOME | End: 2023-06-19
Attending: FAMILY MEDICINE
Payer: MEDICARE

## 2023-06-19 ENCOUNTER — OFFICE VISIT (OUTPATIENT)
Dept: FAMILY MEDICINE | Facility: CLINIC | Age: 73
End: 2023-06-19
Payer: MEDICARE

## 2023-06-19 VITALS
OXYGEN SATURATION: 94 % | WEIGHT: 209 LBS | RESPIRATION RATE: 18 BRPM | BODY MASS INDEX: 27.7 KG/M2 | TEMPERATURE: 99 F | DIASTOLIC BLOOD PRESSURE: 66 MMHG | SYSTOLIC BLOOD PRESSURE: 150 MMHG | HEART RATE: 42 BPM | HEIGHT: 73 IN

## 2023-06-19 DIAGNOSIS — M25.511 ACUTE PAIN OF RIGHT SHOULDER: ICD-10-CM

## 2023-06-19 DIAGNOSIS — M50.90 CERVICAL NECK PAIN WITH EVIDENCE OF DISC DISEASE: ICD-10-CM

## 2023-06-19 DIAGNOSIS — M50.90 CERVICAL NECK PAIN WITH EVIDENCE OF DISC DISEASE: Primary | ICD-10-CM

## 2023-06-19 PROCEDURE — 96372 PR INJECTION,THERAP/PROPH/DIAG2ST, IM OR SUBCUT: ICD-10-PCS | Mod: ,,, | Performed by: FAMILY MEDICINE

## 2023-06-19 PROCEDURE — 99213 OFFICE O/P EST LOW 20 MIN: CPT | Mod: ,,, | Performed by: FAMILY MEDICINE

## 2023-06-19 PROCEDURE — 99213 PR OFFICE/OUTPT VISIT, EST, LEVL III, 20-29 MIN: ICD-10-PCS | Mod: ,,, | Performed by: FAMILY MEDICINE

## 2023-06-19 PROCEDURE — 72050 X-RAY EXAM NECK SPINE 4/5VWS: CPT | Mod: TC,PN

## 2023-06-19 PROCEDURE — 73030 X-RAY EXAM OF SHOULDER: CPT | Mod: TC,PN,RT

## 2023-06-19 PROCEDURE — 96372 THER/PROPH/DIAG INJ SC/IM: CPT | Mod: ,,, | Performed by: FAMILY MEDICINE

## 2023-06-19 RX ORDER — METHYLPREDNISOLONE ACETATE 40 MG/ML
40 INJECTION, SUSPENSION INTRA-ARTICULAR; INTRALESIONAL; INTRAMUSCULAR; SOFT TISSUE
Status: COMPLETED | OUTPATIENT
Start: 2023-06-19 | End: 2023-06-19

## 2023-06-19 RX ORDER — TIZANIDINE 4 MG/1
4 TABLET ORAL EVERY 6 HOURS PRN
Qty: 20 TABLET | Refills: 0 | Status: SHIPPED | OUTPATIENT
Start: 2023-06-19 | End: 2023-06-29

## 2023-06-19 RX ORDER — DEXAMETHASONE SODIUM PHOSPHATE 4 MG/ML
4 INJECTION, SOLUTION INTRA-ARTICULAR; INTRALESIONAL; INTRAMUSCULAR; INTRAVENOUS; SOFT TISSUE
Status: COMPLETED | OUTPATIENT
Start: 2023-06-19 | End: 2023-06-19

## 2023-06-19 RX ADMIN — DEXAMETHASONE SODIUM PHOSPHATE 4 MG: 4 INJECTION, SOLUTION INTRA-ARTICULAR; INTRALESIONAL; INTRAMUSCULAR; INTRAVENOUS; SOFT TISSUE at 05:06

## 2023-06-19 RX ADMIN — METHYLPREDNISOLONE ACETATE 40 MG: 40 INJECTION, SUSPENSION INTRA-ARTICULAR; INTRALESIONAL; INTRAMUSCULAR; SOFT TISSUE at 05:06

## 2023-06-19 NOTE — PROGRESS NOTES
New Clinic Note    Theodore Begum is a 73 y.o. male     CC:   Chief Complaint   Patient presents with    Neck Pain     Complains of dull constant pain in his right shoulder and also in the posterior cervical neck area for one week.  Rates his pain as 6/10 on pain scale.     Shoulder Pain     Complains of one week of posterior cervical neck discomfort and right shoulder pain. Stated he is right handed. Stated he has been using a metal chop saw and does not know if this is due to muscle strain but after one week he has decided to seek medical attention for his pain.     Nicotine Dependence     Smoker for 50 years.         Subjective    History of Present Illness HPI   Patient complains of right shoulder and neck pain for one week. He denies an injury. However he did use a saw that he said could have aggravated his shoulder. Has used icy hot and tylenol with some relief.     Current Outpatient Medications:     albuterol (VENTOLIN HFA) 90 mcg/actuation inhaler, Inhale 2 puffs into the lungs every 6 (six) hours as needed for Wheezing. Rescue, Disp: 18 g, Rfl: 5    amiodarone (PACERONE) 200 MG Tab, Take 1 tablet by mouth once daily., Disp: , Rfl:     apixaban (ELIQUIS) 5 mg Tab, Take 1 tablet by mouth 2 (two) times daily., Disp: , Rfl:     aspirin (ECOTRIN) 81 MG EC tablet, Take 81 mg by mouth once daily., Disp: , Rfl:     FARXIGA 10 mg tablet, Take 10 mg by mouth once daily., Disp: , Rfl:     furosemide (LASIX) 40 MG tablet, Take 40 mg by mouth every morning., Disp: , Rfl:     gabapentin (NEURONTIN) 400 MG capsule, Take 1 capsule (400 mg total) by mouth 3 (three) times daily. For NERVE PAIN, Disp: 270 capsule, Rfl: 1    losartan (COZAAR) 100 MG tablet, TAKE 1 TABLET(100 MG) BY MOUTH EVERY DAY FOR BLOOD PRESSURE, Disp: 90 tablet, Rfl: 1    metoprolol succinate (TOPROL-XL) 25 MG 24 hr tablet, Take 25 mg by mouth once daily., Disp: , Rfl:     metoprolol succinate (TOPROL-XL) 50 MG 24 hr tablet, Take 50 mg by mouth., Disp: ,  "Rfl:     omeprazole (PRILOSEC) 20 MG capsule, Take 1 capsule by mouth once daily., Disp: , Rfl:     oxybutynin (DITROPAN-XL) 10 MG 24 hr tablet, Take 10 mg by mouth., Disp: , Rfl:     rosuvastatin (CRESTOR) 20 MG tablet, Take 1 tablet (20 mg total) by mouth once daily., Disp: 90 tablet, Rfl: 1    spironolactone (ALDACTONE) 25 MG tablet, Take 25 mg by mouth once daily., Disp: , Rfl:      Past Medical History:   Diagnosis Date    A-fib     Hyperlipidemia     Hypertension         Family History   Problem Relation Age of Onset    No Known Problems Mother     No Known Problems Father         Past Surgical History:   Procedure Laterality Date    ABDOMINAL AORTA STENT      CARDIOVERSION      CHOLECYSTECTOMY      HERNIA REPAIR      PROSTATE SURGERY      REPLACEMENT OF STENT          Review of Systems   Constitutional:  Negative for fatigue and fever.   HENT:  Negative for ear pain, postnasal drip, rhinorrhea and sinus pressure/congestion.    Respiratory:  Negative for cough and shortness of breath.    Cardiovascular:  Negative for chest pain.   Gastrointestinal:  Negative for abdominal pain, diarrhea, nausea and vomiting.   Genitourinary:  Negative for dysuria.   Musculoskeletal:  Positive for neck pain.   Neurological:  Negative for headaches.      BP (!) 150/66 (BP Location: Right arm, Patient Position: Sitting, BP Method: Large (Automatic))   Pulse (!) 42   Temp 98.9 °F (37.2 °C) (Oral)   Resp 18   Ht 6' 1" (1.854 m)   Wt 94.8 kg (209 lb)   SpO2 (!) 94%   BMI 27.57 kg/m²      Physical Exam  HENT:      Head: Normocephalic and atraumatic.   Cardiovascular:      Rate and Rhythm: Normal rate and regular rhythm.   Pulmonary:      Effort: Pulmonary effort is normal.      Breath sounds: Normal breath sounds.   Musculoskeletal:      Right shoulder: Tenderness present. Decreased range of motion.      Cervical back: Spasms and tenderness present. Decreased range of motion.   Neurological:      Mental Status: He is alert and " oriented to person, place, and time.   Psychiatric:         Mood and Affect: Mood normal.         Behavior: Behavior normal.        Assessment and Plan      ICD-10-CM ICD-9-CM   1. Cervical neck pain with evidence of disc disease  M50.90 722.91   2. Acute pain of right shoulder  M25.511 719.41        1. Cervical neck pain with evidence of disc disease  -     X-Ray Cervical Spine Complete 5 view; Future; Expected date: 06/19/2023    2. Acute pain of right shoulder  -     X-Ray Cervical Spine Complete 5 view; Future; Expected date: 06/19/2023  -     X-ray Shoulder 2 or More Views Right; Future; Expected date: 06/19/2023  -     methylPREDNISolone acetate injection 40 mg  -     dexAMETHasone injection 4 mg  -     tiZANidine (ZANAFLEX) 4 MG tablet; Take 1 tablet (4 mg total) by mouth every 6 (six) hours as needed (muscle spams).  Dispense: 20 tablet; Refill: 0         Follow up if symptoms worsen or fail to improve.

## 2023-07-03 ENCOUNTER — OFFICE VISIT (OUTPATIENT)
Dept: FAMILY MEDICINE | Facility: CLINIC | Age: 73
End: 2023-07-03
Payer: MEDICARE

## 2023-07-03 VITALS
TEMPERATURE: 98 F | RESPIRATION RATE: 18 BRPM | OXYGEN SATURATION: 95 % | BODY MASS INDEX: 26.9 KG/M2 | HEART RATE: 45 BPM | HEIGHT: 73 IN | WEIGHT: 203 LBS | SYSTOLIC BLOOD PRESSURE: 120 MMHG | DIASTOLIC BLOOD PRESSURE: 63 MMHG

## 2023-07-03 DIAGNOSIS — M54.2 NECK PAIN: ICD-10-CM

## 2023-07-03 DIAGNOSIS — M25.511 ACUTE PAIN OF RIGHT SHOULDER: Primary | ICD-10-CM

## 2023-07-03 PROCEDURE — 99213 OFFICE O/P EST LOW 20 MIN: CPT | Mod: ,,, | Performed by: FAMILY MEDICINE

## 2023-07-03 PROCEDURE — 96372 PR INJECTION,THERAP/PROPH/DIAG2ST, IM OR SUBCUT: ICD-10-PCS | Mod: ,,, | Performed by: FAMILY MEDICINE

## 2023-07-03 PROCEDURE — 96372 THER/PROPH/DIAG INJ SC/IM: CPT | Mod: ,,, | Performed by: FAMILY MEDICINE

## 2023-07-03 PROCEDURE — 99213 PR OFFICE/OUTPT VISIT, EST, LEVL III, 20-29 MIN: ICD-10-PCS | Mod: ,,, | Performed by: FAMILY MEDICINE

## 2023-07-03 RX ORDER — KETOROLAC TROMETHAMINE 30 MG/ML
15 INJECTION, SOLUTION INTRAMUSCULAR; INTRAVENOUS
Status: COMPLETED | OUTPATIENT
Start: 2023-07-03 | End: 2023-07-03

## 2023-07-03 RX ADMIN — KETOROLAC TROMETHAMINE 15 MG: 30 INJECTION, SOLUTION INTRAMUSCULAR; INTRAVENOUS at 01:07

## 2023-07-03 NOTE — PROGRESS NOTES
New Clinic Note    Theodore Begum is a 73 y.o. male     CC:   Chief Complaint   Patient presents with    Shoulder Pain     State he came in sometimes a week ago for right shoulder pain. Xray stated he had arthritis. Received a 4/40 shot and it got better but begin to get worse. Pain level 7/10. Describe it as a dull continuous pain. Have been taking the Zanaflex as prescribed. State he sleep on the right side. Cant sleep flat on back but do roll over to rotate    Neck Pain     Also state he have neck pain. Hurt more when he turn it right then the left.         Subjective    History of Present Illness HPI   Patient complains of right shoulder pain and neck pain for several weeks. He was given a steroid shot last week with some relief but pain has returned. He has taken muscle relaxers with some relief. Pain is worse when he turns his head to the right.     Current Outpatient Medications:     albuterol (VENTOLIN HFA) 90 mcg/actuation inhaler, Inhale 2 puffs into the lungs every 6 (six) hours as needed for Wheezing. Rescue, Disp: 18 g, Rfl: 5    amiodarone (PACERONE) 200 MG Tab, Take 1 tablet by mouth once daily., Disp: , Rfl:     apixaban (ELIQUIS) 5 mg Tab, Take 1 tablet by mouth 2 (two) times daily., Disp: , Rfl:     aspirin (ECOTRIN) 81 MG EC tablet, Take 81 mg by mouth once daily., Disp: , Rfl:     FARXIGA 10 mg tablet, Take 10 mg by mouth once daily., Disp: , Rfl:     furosemide (LASIX) 40 MG tablet, Take 40 mg by mouth every morning., Disp: , Rfl:     gabapentin (NEURONTIN) 400 MG capsule, Take 1 capsule (400 mg total) by mouth 3 (three) times daily. For NERVE PAIN, Disp: 270 capsule, Rfl: 1    losartan (COZAAR) 100 MG tablet, TAKE 1 TABLET(100 MG) BY MOUTH EVERY DAY FOR BLOOD PRESSURE, Disp: 90 tablet, Rfl: 1    metoprolol succinate (TOPROL-XL) 25 MG 24 hr tablet, Take 25 mg by mouth once daily., Disp: , Rfl:     metoprolol succinate (TOPROL-XL) 50 MG 24 hr tablet, Take 50 mg by mouth., Disp: , Rfl:     omeprazole  "(PRILOSEC) 20 MG capsule, Take 1 capsule by mouth once daily., Disp: , Rfl:     oxybutynin (DITROPAN-XL) 10 MG 24 hr tablet, Take 10 mg by mouth., Disp: , Rfl:     rosuvastatin (CRESTOR) 20 MG tablet, Take 1 tablet (20 mg total) by mouth once daily., Disp: 90 tablet, Rfl: 1    spironolactone (ALDACTONE) 25 MG tablet, Take 25 mg by mouth once daily., Disp: , Rfl:      Past Medical History:   Diagnosis Date    A-fib     Hyperlipidemia     Hypertension         Family History   Problem Relation Age of Onset    No Known Problems Mother     No Known Problems Father         Past Surgical History:   Procedure Laterality Date    ABDOMINAL AORTA STENT      CARDIOVERSION      CHOLECYSTECTOMY      HERNIA REPAIR      PROSTATE SURGERY      REPLACEMENT OF STENT          Review of Systems   Constitutional:  Negative for fatigue and fever.   HENT:  Negative for ear pain, postnasal drip, rhinorrhea and sinus pressure/congestion.    Respiratory:  Negative for cough and shortness of breath.    Cardiovascular:  Negative for chest pain.   Gastrointestinal:  Negative for abdominal pain, diarrhea, nausea and vomiting.   Genitourinary:  Negative for dysuria.   Musculoskeletal:  Positive for neck pain.   Neurological:  Negative for headaches.        /63 (BP Location: Left arm, Patient Position: Sitting, BP Method: Large (Automatic))   Pulse (!) 45   Temp 97.7 °F (36.5 °C) (Oral)   Resp 18   Ht 6' 1" (1.854 m)   Wt 92.1 kg (203 lb)   SpO2 95%   BMI 26.78 kg/m²      Physical Exam  HENT:      Head: Normocephalic and atraumatic.      Mouth/Throat:      Pharynx: Oropharynx is clear.   Cardiovascular:      Rate and Rhythm: Normal rate and regular rhythm.   Pulmonary:      Effort: Pulmonary effort is normal.      Breath sounds: Normal breath sounds.   Musculoskeletal:      Right shoulder: Tenderness present. No swelling. Decreased range of motion.      Cervical back: Tenderness present. Decreased range of motion.   Neurological:      " Mental Status: He is alert and oriented to person, place, and time.   Psychiatric:         Mood and Affect: Mood normal.         Behavior: Behavior normal.          Assessment and Plan      ICD-10-CM ICD-9-CM   1. Acute pain of right shoulder  M25.511 719.41   2. Neck pain  M54.2 723.1        1. Acute pain of right shoulder  -     ketorolac injection 15 mg    2. Neck pain    Continue current meds. If he does not improve, he is to let us know.     Follow up if symptoms worsen or fail to improve.

## 2023-08-07 ENCOUNTER — OFFICE VISIT (OUTPATIENT)
Dept: DERMATOLOGY | Facility: CLINIC | Age: 73
End: 2023-08-07
Payer: MEDICARE

## 2023-08-07 DIAGNOSIS — L57.0 AK (ACTINIC KERATOSIS): ICD-10-CM

## 2023-08-07 DIAGNOSIS — L82.1 SK (SEBORRHEIC KERATOSIS): ICD-10-CM

## 2023-08-07 DIAGNOSIS — D48.9 NEOPLASM OF UNCERTAIN BEHAVIOR: ICD-10-CM

## 2023-08-07 DIAGNOSIS — B07.9 VERRUCA VULGARIS: ICD-10-CM

## 2023-08-07 DIAGNOSIS — L57.8 OTHER SKIN CHANGES DUE TO CHRONIC EXPOSURE TO NONIONIZING RADIATION: Primary | ICD-10-CM

## 2023-08-07 DIAGNOSIS — L72.0 EPIDERMAL CYST: ICD-10-CM

## 2023-08-07 DIAGNOSIS — Z85.828 HISTORY OF NONMELANOMA SKIN CANCER: ICD-10-CM

## 2023-08-07 PROCEDURE — 88305 TISSUE EXAM BY PATHOLOGIST: CPT | Mod: TC,SUR | Performed by: DERMATOLOGY

## 2023-08-07 PROCEDURE — 17003 DESTRUCT PREMALG LES 2-14: CPT | Mod: ,,, | Performed by: DERMATOLOGY

## 2023-08-07 PROCEDURE — 11103 PR TANGENTIAL BIOPSY, SKIN, EA ADDTL LESION: ICD-10-PCS | Mod: ,,, | Performed by: DERMATOLOGY

## 2023-08-07 PROCEDURE — 88305 TISSUE EXAM BY PATHOLOGIST: CPT | Mod: 26,,, | Performed by: PATHOLOGY

## 2023-08-07 PROCEDURE — 11103 TANGNTL BX SKIN EA SEP/ADDL: CPT | Mod: ,,, | Performed by: DERMATOLOGY

## 2023-08-07 PROCEDURE — 17000 PR DESTRUCTION(LASER SURGERY,CRYOSURGERY,CHEMOSURGERY),PREMALIGNANT LESIONS,FIRST LESION: ICD-10-PCS | Mod: XS,,, | Performed by: DERMATOLOGY

## 2023-08-07 PROCEDURE — 17003 DESTRUCTION, PREMALIGNANT LESIONS; SECOND THROUGH 14 LESIONS: ICD-10-PCS | Mod: ,,, | Performed by: DERMATOLOGY

## 2023-08-07 PROCEDURE — 11102 PR TANGENTIAL BIOPSY, SKIN, SINGLE LESION: ICD-10-PCS | Mod: ,,, | Performed by: DERMATOLOGY

## 2023-08-07 PROCEDURE — 99203 PR OFFICE/OUTPT VISIT, NEW, LEVL III, 30-44 MIN: ICD-10-PCS | Mod: 25,,, | Performed by: DERMATOLOGY

## 2023-08-07 PROCEDURE — 17000 DESTRUCT PREMALG LESION: CPT | Mod: XS,,, | Performed by: DERMATOLOGY

## 2023-08-07 PROCEDURE — 99203 OFFICE O/P NEW LOW 30 MIN: CPT | Mod: 25,,, | Performed by: DERMATOLOGY

## 2023-08-07 PROCEDURE — 11102 TANGNTL BX SKIN SINGLE LES: CPT | Mod: ,,, | Performed by: DERMATOLOGY

## 2023-08-07 PROCEDURE — 88305 PATHOLOGY, DERMATOLOGY: ICD-10-PCS | Mod: 26,,, | Performed by: PATHOLOGY

## 2023-08-07 NOTE — PROGRESS NOTES
[facility]   Vicky Colunga MD    Patient Name: Theodore Begum  Patient YOB: 1950   Date of Service: 8/7/23    CC: Above the Waist Skin Exam    HPI: Theodore Begum is a 73 y.o. male presents today for an above the waist skin exam.  Patient has been seen by a dermatologist in the past and dermatologic history includes SCC. Patient is concerned today about a lesion located on the left arm.  It has been present for 1 year(s). It has not been treated in the past.  Patient is also concerned about lesions on the right arm and back.    Past Medical History:   Diagnosis Date    A-fib     Hyperlipidemia     Hypertension      Past Surgical History:   Procedure Laterality Date    ABDOMINAL AORTA STENT      CARDIOVERSION      CHOLECYSTECTOMY      HERNIA REPAIR      PROSTATE SURGERY      REPLACEMENT OF STENT       Review of patient's allergies indicates:   Allergen Reactions    Moxifloxacin Shortness Of Breath    Cefaclor Other (See Comments)     Abdominal pain       Current Outpatient Medications:     albuterol (VENTOLIN HFA) 90 mcg/actuation inhaler, Inhale 2 puffs into the lungs every 6 (six) hours as needed for Wheezing. Rescue, Disp: 18 g, Rfl: 5    amiodarone (PACERONE) 200 MG Tab, Take 1 tablet by mouth once daily., Disp: , Rfl:     apixaban (ELIQUIS) 5 mg Tab, Take 1 tablet by mouth 2 (two) times daily., Disp: , Rfl:     aspirin (ECOTRIN) 81 MG EC tablet, Take 81 mg by mouth once daily., Disp: , Rfl:     FARXIGA 10 mg tablet, Take 10 mg by mouth once daily., Disp: , Rfl:     furosemide (LASIX) 40 MG tablet, Take 40 mg by mouth every morning., Disp: , Rfl:     gabapentin (NEURONTIN) 400 MG capsule, Take 1 capsule (400 mg total) by mouth 3 (three) times daily. For NERVE PAIN, Disp: 270 capsule, Rfl: 1    losartan (COZAAR) 100 MG tablet, TAKE 1 TABLET(100 MG) BY MOUTH EVERY DAY FOR BLOOD PRESSURE, Disp: 90 tablet, Rfl: 1    metoprolol succinate (TOPROL-XL) 25 MG 24 hr tablet, Take 25 mg by mouth once  daily., Disp: , Rfl:     metoprolol succinate (TOPROL-XL) 50 MG 24 hr tablet, Take 50 mg by mouth., Disp: , Rfl:     omeprazole (PRILOSEC) 20 MG capsule, Take 1 capsule by mouth once daily., Disp: , Rfl:     oxybutynin (DITROPAN-XL) 10 MG 24 hr tablet, Take 10 mg by mouth., Disp: , Rfl:     rosuvastatin (CRESTOR) 20 MG tablet, Take 1 tablet (20 mg total) by mouth once daily., Disp: 90 tablet, Rfl: 1    spironolactone (ALDACTONE) 25 MG tablet, Take 25 mg by mouth once daily., Disp: , Rfl:     ROS: A focused review of systems was obtained and negative.     Exam: A sun exposed skin exam was performed including scalp, hair, face, neck, chest, back, abdomen, right arm, left arm, right hand, left hand, and nails.  All areas examined were normal expect as per below in assessment and plan.  General Appearance of the patient is well developed and well nourished.  Orientation: alert and oriented x 3.  Mood and affect: pleasant.    Assessment:   The primary encounter diagnosis was Other skin changes due to chronic exposure to nonionizing radiation. Diagnoses of Neoplasm of uncertain behavior, SK (seborrheic keratosis), AK (actinic keratosis), Verruca vulgaris, History of nonmelanoma skin cancer, and Epidermal cyst were also pertinent to this visit.    Plan:        Seborrheic Keratosis (L82.1)  - Stuck-on, warty, greasy brown papule with pseudo-horn cysts scattered on the trunk and extremities    Plan: Counseling.  I counseled the patient regarding the following:  Skin Care: Seborrheic Keratoses are benign. No treatment is necessary.  Expectations: Seborrheic Keratoses are benign warty growths. Patients get more of them as they age    Plan: Reassurance      Actinic Keratoses(L57.0)  - Erythematous patches and papules with hyperkeratotic scale distributed on the left hand, left arm, face, right arm, and right hand.    Plan: Counseling.  I counseled the patient regarding the following:  Skin Care: Sun protective clothing and  broad spectrum sunscreen can prevent the formation of Actinic  Keratoses. AKs can resolve with cryotherapy, photodynamic therapy, imiquimod, topical 5-FU.  Expectations: Actinic Keratoses are precancerous proliferations that occur within sun damaged skin. If untreated,  a small subset of AKs can develop into Squamous Cell Carcinoma.  Contact Office if: If AKs fail to resolve despite treatment, or if you develop a side effect from therapy, such as  unbearable crusting, scabbing, redness and tenderness.    Plan: Liquid Nitrogen.  A total of 12 lesions were treated with liquid nitrogen for 2 freeze-thaw cycles lasting 5 seconds, located on the above locations.   The patient's consent was obtained including but not limited to risks of crusting, scabbing,  blistering, scarring, darker or lighter pigmentary change, recurrence, incomplete removal and infection.      Verruca Vulgaris  - verrucous papules with thrombosed capillary loops located on the left arm  Associated diagnoses: Cutaneous Inflammation and Lesions are Contagious    Plan: Counseling  I counseled the patient regarding the following:  Skin Care: Verruca Vulgaris can be treated with retinoids, aldara, salicylic acid preparations or cryotherapy.  Expectations: Verruca Vulgaris are cauliflower-like bumps caused by viral infections. They can be spread through direct contact and usually resolve with treatment.  Contact Office if: The warts spread, or recur despite treatment.  Warts are stubborn and may require multiple treatments.    - pt declines treatment       Epidermal Cyst  - subcutaneous cysts with prominent follicular pore located on the upper back    Plan: Counseling  I counseled the patient regarding the following:  Skin Care: Epidermal Cysts require no specific skin care.  Expectations: Epidermal Cysts are benign sacs within the skin that contain keratin.  Contact Office if: Epidermal Cysts rupture or become red and tender.    - Will wait for biopsy  results on other lesions before scheduling excision for cysts      Neoplasm of Uncertain Behavior (D48.5)  - Eroded plaque located on the left dorsal wrist  Ddx includes: BCC    Plan: Biopsy by Shave Method.  Location (1): Left dorsal wrist  Written consent was obtained and risks were reviewed including but not  limited to scarring, infection, bleeding, scabbing, incomplete removal, nerve damage and allergy to anesthesia.  The area was prepped with Chloraprep. Local anesthesia was obtained with approximately 0.5cc of 1% lidocaine  with epinephrine. A biopsy by shave method to the level of the dermis (sent for H and E) was performed using  a Dermablade on the above location. Aluminum chloride was used for hemostasis. Following the biopsy  Petrolatum and a bandage were applied. Patient will be notified of biopsy results. However, patient instructed to  call the office if not contacted within 2 weeks.    - Keratotic nodule located on the right mid dorsal forearm  Ddx includes: SCC vs AK    Plan: Biopsy by Shave Method.  Location (2): Right mid dorsal forearm  Written consent was obtained and risks were reviewed including but not  limited to scarring, infection, bleeding, scabbing, incomplete removal, nerve damage and allergy to anesthesia.  The area was prepped with Chloraprep. Local anesthesia was obtained with approximately 0.5cc of 1% lidocaine  with epinephrine. A biopsy by shave method to the level of the dermis (sent for H and E) was performed using  a Dermablade on the above location. Aluminum chloride was used for hemostasis. Following the biopsy  Petrolatum and a bandage were applied. Patient will be notified of biopsy results. However, patient instructed to  call the office if not contacted within 2 weeks.    Plan: Counseling.  I counseled the patient regarding the following:  Instructions: Neoplasms of Uncertain Behavior can be observed, biopsied or surgically removed depending on the  level of clinical  suspicion.  Instructions: Neoplasms of Uncertain Behavior can be observed, biopsied or surgically removed depending on the  level of clinical suspicion.  Contact Office if: patient develops any new lesions that fail to heal, ulcerate or bleed.      History of non-melanoma skin cancer (Z85.828)  - Well healed scar with NER  Associated diagnosis: Medical surveillance following completed treatment    Plan: Monitoring.      Other Skin Changes Due to Chronic Exposure of Nonionizing Radiation (L57.8)    Plan: Monitoring.     Plan: Sunscreen Recommendations.  I recommended a broad spectrum sunscreen with a SPF of 30 or higher. I explained that SPF 30 sunscreens block approximately 97 percent of the  sun's harmful rays. Sunscreens should be applied at least 15 minutes prior to expected sun exposure and then every 2 hours after that as long as  sun exposure continues. If swimming or exercising sunscreen should be reapplied every 45 minutes to an hour after getting wet or sweating. One  ounce, or the equivalent of a shot glass full of sunscreen, is adequate to protect the skin not covered by a bathing suit. I also recommended a lip  balm with a sunscreen as well. Sun protective clothing can be used in lieu of sunscreen but must be worn the entire time you are exposed to the  sun's rays.    Follow up in about 6 months (around 2/7/2024) for SEE.    Vicky Colunga MD

## 2023-08-09 LAB
DHEA SERPL-MCNC: NORMAL
ESTROGEN SERPL-MCNC: NORMAL PG/ML
INSULIN SERPL-ACNC: NORMAL U[IU]/ML
LAB AP GROSS DESCRIPTION: NORMAL
LAB AP LABORATORY NOTES: NORMAL
LAB AP SPEC A DDX: NORMAL
LAB AP SPEC A MORPHOLOGY: NORMAL
LAB AP SPEC A PROCEDURE: NORMAL
LAB AP SPEC B DDX: NORMAL
LAB AP SPEC B MORPHOLOGY: NORMAL
LAB AP SPEC B PROCEDURE: NORMAL
T3RU NFR SERPL: NORMAL %

## 2023-08-31 ENCOUNTER — PROCEDURE VISIT (OUTPATIENT)
Dept: DERMATOLOGY | Facility: CLINIC | Age: 73
End: 2023-08-31
Payer: MEDICARE

## 2023-08-31 DIAGNOSIS — C44.619 BASAL CELL CARCINOMA (BCC) OF SKIN OF LEFT UPPER EXTREMITY INCLUDING SHOULDER: ICD-10-CM

## 2023-08-31 DIAGNOSIS — L21.9 SEBORRHEIC DERMATITIS: Primary | ICD-10-CM

## 2023-08-31 DIAGNOSIS — D09.9 SQUAMOUS CELL CARCINOMA IN SITU: ICD-10-CM

## 2023-08-31 DIAGNOSIS — L08.9 SKIN INFECTION: ICD-10-CM

## 2023-08-31 DIAGNOSIS — L72.0 EPIDERMAL CYST: ICD-10-CM

## 2023-08-31 PROCEDURE — 99214 OFFICE O/P EST MOD 30 MIN: CPT | Mod: 25,,, | Performed by: DERMATOLOGY

## 2023-08-31 PROCEDURE — 12035 PR LAYR CLOS WND TRUNK,ARM,LEG 12.6-20 CM: ICD-10-PCS | Mod: 51,,, | Performed by: DERMATOLOGY

## 2023-08-31 PROCEDURE — 11102 TANGNTL BX SKIN SINGLE LES: CPT | Mod: XS,,, | Performed by: DERMATOLOGY

## 2023-08-31 PROCEDURE — 88305 TISSUE EXAM BY PATHOLOGIST: CPT | Mod: 26,,, | Performed by: PATHOLOGY

## 2023-08-31 PROCEDURE — 87070 CULTURE, WOUND: ICD-10-PCS | Mod: ,,, | Performed by: CLINICAL MEDICAL LABORATORY

## 2023-08-31 PROCEDURE — 87186 CULTURE, WOUND: ICD-10-PCS | Mod: ,,, | Performed by: CLINICAL MEDICAL LABORATORY

## 2023-08-31 PROCEDURE — 11102 PR TANGENTIAL BIOPSY, SKIN, SINGLE LESION: ICD-10-PCS | Mod: XS,,, | Performed by: DERMATOLOGY

## 2023-08-31 PROCEDURE — 87077 CULTURE, WOUND: ICD-10-PCS | Mod: ,,, | Performed by: CLINICAL MEDICAL LABORATORY

## 2023-08-31 PROCEDURE — 87077 CULTURE AEROBIC IDENTIFY: CPT | Mod: ,,, | Performed by: CLINICAL MEDICAL LABORATORY

## 2023-08-31 PROCEDURE — 11603 EXC TR-EXT MAL+MARG 2.1-3 CM: CPT | Mod: ,,, | Performed by: DERMATOLOGY

## 2023-08-31 PROCEDURE — 99214 PR OFFICE/OUTPT VISIT, EST, LEVL IV, 30-39 MIN: ICD-10-PCS | Mod: 25,,, | Performed by: DERMATOLOGY

## 2023-08-31 PROCEDURE — 11403 EXC TR-EXT B9+MARG 2.1-3CM: CPT | Mod: ,,, | Performed by: DERMATOLOGY

## 2023-08-31 PROCEDURE — 11602 PR EXC SKIN MALIG 1.1-2 CM TRUNK,ARM,LEG: ICD-10-PCS | Mod: ,,, | Performed by: DERMATOLOGY

## 2023-08-31 PROCEDURE — 11603 PR EXC SKIN MALIG 2.1-3 CM TRUNK,ARM,LEG: ICD-10-PCS | Mod: ,,, | Performed by: DERMATOLOGY

## 2023-08-31 PROCEDURE — 88304 PATHOLOGY, DERMATOLOGY: ICD-10-PCS | Mod: 26,,, | Performed by: PATHOLOGY

## 2023-08-31 PROCEDURE — 11602 EXC TR-EXT MAL+MARG 1.1-2 CM: CPT | Mod: ,,, | Performed by: DERMATOLOGY

## 2023-08-31 PROCEDURE — 88304 TISSUE EXAM BY PATHOLOGIST: CPT | Mod: 26,,, | Performed by: PATHOLOGY

## 2023-08-31 PROCEDURE — 87070 CULTURE OTHR SPECIMN AEROBIC: CPT | Mod: ,,, | Performed by: CLINICAL MEDICAL LABORATORY

## 2023-08-31 PROCEDURE — 11404 PR EXC SKIN BENIG 3.1-4 CM TRUNK,ARM,LEG: ICD-10-PCS | Mod: ,,, | Performed by: DERMATOLOGY

## 2023-08-31 PROCEDURE — 11404 EXC TR-EXT B9+MARG 3.1-4 CM: CPT | Mod: ,,, | Performed by: DERMATOLOGY

## 2023-08-31 PROCEDURE — 87186 SC STD MICRODIL/AGAR DIL: CPT | Mod: ,,, | Performed by: CLINICAL MEDICAL LABORATORY

## 2023-08-31 PROCEDURE — 12035 INTMD RPR S/A/T/EXT 12.6-20: CPT | Mod: 51,,, | Performed by: DERMATOLOGY

## 2023-08-31 PROCEDURE — 88305 TISSUE EXAM BY PATHOLOGIST: CPT | Mod: TC,SUR | Performed by: DERMATOLOGY

## 2023-08-31 PROCEDURE — 88305 PATHOLOGY, DERMATOLOGY: ICD-10-PCS | Mod: 26,,, | Performed by: PATHOLOGY

## 2023-08-31 PROCEDURE — 11403 PR EXC SKIN BENIG 2.1-3 CM TRUNK,ARM,LEG: ICD-10-PCS | Mod: ,,, | Performed by: DERMATOLOGY

## 2023-08-31 RX ORDER — CLOBETASOL PROPIONATE 0.46 MG/ML
SOLUTION TOPICAL
Qty: 50 ML | Refills: 3 | Status: SHIPPED | OUTPATIENT
Start: 2023-08-31 | End: 2024-01-29

## 2023-08-31 RX ORDER — KETOCONAZOLE 20 MG/ML
SHAMPOO, SUSPENSION TOPICAL
Qty: 120 ML | Refills: 11 | Status: SHIPPED | OUTPATIENT
Start: 2023-08-31 | End: 2024-01-29

## 2023-08-31 NOTE — PROGRESS NOTES
[facility]   Vicky Colunga MD    Patient Name: Theodore Begum  Patient YOB: 1950   Date of Service: 8/31/23    CC: Excision    HPI: Theodore Begum is a 73 y.o. male here today for excision of BCC located on the left dorsal wrist, SCCis on the right mid dorsal forearm, and epidermal cyst x2 located on the superior right upper back and inferior right upper back.      He is also concerned about an itchy rash on the scalp that comes and goes.    Past Medical History:   Diagnosis Date    A-fib     Hyperlipidemia     Hypertension      Past Surgical History:   Procedure Laterality Date    ABDOMINAL AORTA STENT      CARDIOVERSION      CHOLECYSTECTOMY      HERNIA REPAIR      PROSTATE SURGERY      REPLACEMENT OF STENT       Review of patient's allergies indicates:   Allergen Reactions    Moxifloxacin Shortness Of Breath    Cefaclor Other (See Comments)     Abdominal pain       Current Outpatient Medications:     albuterol (VENTOLIN HFA) 90 mcg/actuation inhaler, Inhale 2 puffs into the lungs every 6 (six) hours as needed for Wheezing. Rescue, Disp: 18 g, Rfl: 5    amiodarone (PACERONE) 200 MG Tab, Take 1 tablet by mouth once daily., Disp: , Rfl:     apixaban (ELIQUIS) 5 mg Tab, Take 1 tablet by mouth 2 (two) times daily., Disp: , Rfl:     aspirin (ECOTRIN) 81 MG EC tablet, Take 81 mg by mouth once daily., Disp: , Rfl:     clobetasoL (TEMOVATE) 0.05 % external solution, Apply to AA on scalp BID PRN flares tapering with improvement, Disp: 50 mL, Rfl: 3    doxycycline (VIBRAMYCIN) 100 MG Cap, Take 1 capsule (100 mg total) by mouth 2 (two) times a day. for 14 days, Disp: 28 capsule, Rfl: 0    FARXIGA 10 mg tablet, Take 10 mg by mouth once daily., Disp: , Rfl:     furosemide (LASIX) 40 MG tablet, Take 40 mg by mouth every morning., Disp: , Rfl:     gabapentin (NEURONTIN) 400 MG capsule, Take 1 capsule (400 mg total) by mouth 3 (three) times daily. For NERVE PAIN, Disp: 270 capsule, Rfl: 1    ketoconazole  (NIZORAL) 2 % shampoo, Use as a scalp treatment 2-3 times a week for maintenance, massaging into scalp and leaving on for up to 3 minutes before rinsing, Disp: 120 mL, Rfl: 11    losartan (COZAAR) 100 MG tablet, TAKE 1 TABLET(100 MG) BY MOUTH EVERY DAY FOR BLOOD PRESSURE, Disp: 90 tablet, Rfl: 1    metoprolol succinate (TOPROL-XL) 25 MG 24 hr tablet, Take 25 mg by mouth once daily., Disp: , Rfl:     metoprolol succinate (TOPROL-XL) 50 MG 24 hr tablet, Take 50 mg by mouth., Disp: , Rfl:     omeprazole (PRILOSEC) 20 MG capsule, Take 1 capsule by mouth once daily., Disp: , Rfl:     oxybutynin (DITROPAN-XL) 10 MG 24 hr tablet, Take 10 mg by mouth., Disp: , Rfl:     rosuvastatin (CRESTOR) 20 MG tablet, Take 1 tablet (20 mg total) by mouth once daily., Disp: 90 tablet, Rfl: 1    spironolactone (ALDACTONE) 25 MG tablet, Take 25 mg by mouth once daily., Disp: , Rfl:     ROS: A focused review of systems was obtained and negative.     Exam: A focused skin exam was performed and the biopsy site was identified.  General Appearance of the patient is well developed and well nourished.  Orientation: alert and oriented x 3.  Mood and affect: pleasant.    There were no vitals filed for this visit.    Assessment:   The primary encounter diagnosis was Seborrheic dermatitis. Diagnoses of Basal cell carcinoma (BCC) of skin of left upper extremity including shoulder, Squamous cell carcinoma in situ, Epidermal cyst, and Skin infection were also pertinent to this visit.        Medications Ordered This Encounter   Medications    clobetasoL (TEMOVATE) 0.05 % external solution     Sig: Apply to AA on scalp BID PRN flares tapering with improvement     Dispense:  50 mL     Refill:  3    ketoconazole (NIZORAL) 2 % shampoo     Sig: Use as a scalp treatment 2-3 times a week for maintenance, massaging into scalp and leaving on for up to 3 minutes before rinsing     Dispense:  120 mL     Refill:  11       Location (A): Left dorsal wrist, BCC  Preop  Size: 1.5 x 1.8 cm  Margin: 0.4 cm  Total Excised Diameter: 2.3 x 2.6 cm  Procedure: Excision - Elliptical  Anesthesia: local infiltration-1% lidocaine with epinephrine (12 cc)  Estimated Blood Loss: minimal  Complications: none    Repair Type: Intermediate  Repair Length: 7.5cm    Consent was obtained from the patient. The risks and benefits to therapy were discussed in detail. Specifically, the risks of infection, scarring, bleeding, prolonged wound healing, incomplete removal, allergy to anesthesia, nerve injury and recurrence were addressed. Prior to the procedure, the treatment site was clearly identified and confirmed by the patient. All components of Universal Protocol/PAUSE Rule completed.    Procedure: The patient was placed in a comfortable position exposing the surgical site. The area was prepped with Hibiclens and draped in the usual fashion. An elliptical excision was performed, on the above listed location The margin was drawn around the clinically apparent lesion. An elliptical shape was then drawn on the skin incorporating the lesion and margins. Incisions were then made along these lines to the appropriate tissue plane and the lesion was extirpated. A 10 blade was used. The lesion was excised to the layer of the adipose tissue, removed and sent to pathology for processing and histologic evaluation.    Intermediate layered repair was performed because closure of the subcutaneous tissue and superficial non-muscular fascia was required, because damaged skin surrounding defect made closure difficult, because extensive undermining required, and because Burow's triangles were required. Undermining was performed with blunt dissection. Hemostasis was achieved with electrocautery. The subcutaneous tissue and dermis were closed with 3-0 Vicryl (interrupted). Epidermal closure was achieved with 4-0 Ethilon(interrupted). Petrolatum + dry sterile dressing were applied. I reviewed with the patient in detail  post-care instructions. Patient is not to engage in any heavy lifting, exercise, or swimming for the next 14 days. Should the patient develop any fevers, chills, bleeding, severe pain patient will contact the office immediately. Suture removal in 14 days.      Location (B): Right mid dorsal forearm, SCCis  Preop Size: 0.8 x 1.2 cm  Margin: 0.4 cm  Total Excised Diameter: 1.6 x 2.0 cm  Procedure: Excision - Elliptical  Anesthesia: local infiltration-1% lidocaine with epinephrine (12 cc)  Estimated Blood Loss: minimal  Complications: none    Repair Type: Intermediate  Repair Length: 4.3cm    Consent was obtained from the patient. The risks and benefits to therapy were discussed in detail. Specifically, the risks of infection, scarring, bleeding, prolonged wound healing, incomplete removal, allergy to anesthesia, nerve injury and recurrence were addressed. Prior to the procedure, the treatment site was clearly identified and confirmed by the patient. All components of Universal Protocol/PAUSE Rule completed.    Procedure: The patient was placed in a comfortable position exposing the surgical site. The area was prepped with Hibiclens and draped in the usual fashion. An elliptical excision was performed, on the above listed location The margin was drawn around the clinically apparent lesion. An elliptical shape was then drawn on the skin incorporating the lesion and margins. Incisions were then made along these lines to the appropriate tissue plane and the lesion was extirpated. A 10 blade was used. The lesion was excised to the layer of the adipose tissue, removed and sent to pathology for processing and histologic evaluation.    Intermediate layered repair was performed because closure of the subcutaneous tissue and superficial non-muscular fascia was required, because damaged skin surrounding defect made closure difficult, because extensive undermining required, and because Burow's triangles were required. Undermining  was performed with blunt dissection. Hemostasis was achieved with electrocautery. The subcutaneous tissue and dermis were closed with 3-0 Vicryl (interrupted). Epidermal closure was achieved with 3-0 Ethilon (interrupted). Petrolatum + dry sterile dressing were applied. I reviewed with the patient in detail post-care instructions. Patient is not to engage in any heavy lifting, exercise, or swimming for the next 14 days. Should the patient develop any fevers, chills, bleeding, severe pain patient will contact the office immediately. Suture removal in 14 days.      Location (C): Superior right upper back  Preop Size: 2.3 x 2.3  Total Excised Diameter: 2.3 x 2.3 cm  Procedure: Excision - Elliptical  Anesthesia: local infiltration-1% lidocaine with epinephrine (12 cc)  Estimated Blood Loss: minimal  Complications: none    Repair Type: Intermediate  Repair Length: 4.0cm    Consent was obtained from the patient. The risks and benefits to therapy were discussed in detail. Specifically, the risks of infection, scarring, bleeding, prolonged wound healing, incomplete removal, allergy to anesthesia, nerve injury and recurrence were addressed. Prior to the procedure, the treatment site was clearly identified and confirmed by the patient. All components of Universal Protocol/PAUSE Rule completed.    Procedure: The patient was placed in a comfortable position exposing the surgical site. The area was prepped with Hibiclens and draped in the usual fashion. An elliptical excision was performed, on the above listed location The margin was drawn around the clinically apparent lesion. An elliptical shape was then drawn on the skin incorporating the lesion and margins. Incisions were then made along these lines to the appropriate tissue plane and the lesion was extirpated. A 10 blade was used. The lesion was excised to the layer of the adipose tissue, removed and sent to pathology for processing and histologic  evaluation.    Intermediate layered repair was performed because closure of the subcutaneous tissue and superficial non-muscular fascia was required, because damaged skin surrounding defect made closure difficult, because extensive undermining required, and because Burow's triangles were required. Undermining was performed with blunt dissection. Hemostasis was achieved with electrocautery. The subcutaneous tissue and dermis were closed with 3-0 Vicryl (interrupted). Epidermal closure was achieved with 3-0 Ethilon(interrupted). Petrolatum + dry sterile dressing were applied. I reviewed with the patient in detail post-care instructions. Patient is not to engage in any heavy lifting, exercise, or swimming for the next 14 days. Should the patient develop any fevers, chills, bleeding, severe pain patient will contact the office immediately. Suture removal in 14 days.      Location (D): Inferior right upper back  Preop Size: 3.0 x 3.2 cm  Total Excised Diameter: 3.0 x 3.2 cm  Procedure: Excision - Elliptical  Anesthesia: local infiltration-1% lidocaine with epinephrine (12 cc)  Estimated Blood Loss: minimal  Complications: none    Repair Type: Intermediate  Repair Length: 3.5cm    Consent was obtained from the patient. The risks and benefits to therapy were discussed in detail. Specifically, the risks of infection, scarring, bleeding, prolonged wound healing, incomplete removal, allergy to anesthesia, nerve injury and recurrence were addressed. Prior to the procedure, the treatment site was clearly identified and confirmed by the patient. All components of Universal Protocol/PAUSE Rule completed.    Procedure: The patient was placed in a comfortable position exposing the surgical site. The area was prepped with Hibiclens and draped in the usual fashion. An elliptical excision was performed, on the above listed location The margin was drawn around the clinically apparent lesion. An elliptical shape was then drawn on the  skin incorporating the lesion and margins. Incisions were then made along these lines to the appropriate tissue plane and the lesion was extirpated. A 10 blade was used. The lesion was excised to the layer of the adipose tissue, removed and sent to pathology for processing and histologic evaluation.    Intermediate layered repair was performed because closure of the subcutaneous tissue and superficial non-muscular fascia was required, because damaged skin surrounding defect made closure difficult, because extensive undermining required, and because Burow's triangles were required. Undermining was performed with blunt dissection. Hemostasis was achieved with electrocautery. The subcutaneous tissue and dermis were closed with 3-0 Vicryl (interrupted). Epidermal closure was achieved with 3-0 Ethilon(interrupted). Petrolatum + dry sterile dressing were applied. I reviewed with the patient in detail post-care instructions. Patient is not to engage in any heavy lifting, exercise, or swimming for the next 14 days. Should the patient develop any fevers, chills, bleeding, severe pain patient will contact the office immediately. Suture removal in 14 days.      Neoplasm of Uncertain Behavior (D48.5)  - Verrucous papule located on the right distal ulnar forearm  Ddx includes: Verruca    Plan: Counseling.  I counseled the patient regarding the following:  Instructions: Neoplasms of Uncertain Behavior can be observed, biopsied or surgically removed depending on the  level of clinical suspicion.  Instructions: Neoplasms of Uncertain Behavior can be observed, biopsied or surgically removed depending on the  level of clinical suspicion.  Contact Office if: patient develops any new lesions that fail to heal, ulcerate or bleed.    Plan: Biopsy by Shave Method.  Location (E): Right distal ulnar forearm  Written consent was obtained and risks were reviewed including but not  limited to scarring, infection, bleeding, scabbing, incomplete  removal, nerve damage and allergy to anesthesia.  The area was prepped with Chloraprep. Local anesthesia was obtained with approximately 0.5cc of 1% lidocaine  with epinephrine. A biopsy by shave method to the level of the dermis (sent for H and E) was performed using  a Dermablade on the above location. Aluminum chloride was used for hemostasis. Following the biopsy  Petrolatum and a bandage were applied. Patient will be notified of biopsy results. However, patient instructed to  call the office if not contacted within 2 weeks.      Seborrheic Dermatitis (L21.8)  - Pink/orange scaly plaques located on the scalp, nasolabial folds, and eyebrows    Status: Inadequately controlled    Plan: Counseling.  I counseled the patient regarding the following:  Skin care: Emollients, shampoos with tar, selenium or zinc pyrithione can improve seborrheic dermatitis.  Expectations: Seborrheic Dermatitis is chronic in nature with periods of remissions and flares. Flares can be  triggered by stress.  Contact office if: Seborrheic dermatitis worsens, or fails to improve despite several months of treatment.    Plan: Prescription     - Will send in Clobetasol solution and Ketoconazole shampoo    Skin Infection, NOS   - crusting of above morphology     Plan: Counseling  I counseled the patient regarding the following:  Skin care: Patients with purulence or fluid collections should have their wounds re-opened, drained, cultured and irrigated. All wound infections should be treated with antibiotics.  Expectations: Wound Infections usually occur 4-7 days postoperatively. Patients exhibit, pain, rednes, swelling, cellulitic changes and fever.  Contact Office if: Wound Infection fails to respond to treatment or worsens, patient develops a fever, or if redness spreads despite antibiotics.    A bacterial culture was obtained from the scalp    Follow up in about 2 weeks (around 9/14/2023) for Suture removal.    Vicky Colunga MD

## 2023-09-02 LAB — MICROORGANISM SPEC CULT: ABNORMAL

## 2023-09-05 DIAGNOSIS — B95.8 STAPH SKIN INFECTION: Primary | ICD-10-CM

## 2023-09-05 DIAGNOSIS — L08.9 STAPH SKIN INFECTION: Primary | ICD-10-CM

## 2023-09-05 LAB
DHEA SERPL-MCNC: NORMAL
ESTROGEN SERPL-MCNC: NORMAL PG/ML
INSULIN SERPL-ACNC: NORMAL U[IU]/ML
LAB AP GROSS DESCRIPTION: NORMAL
LAB AP LABORATORY NOTES: NORMAL
LAB AP SPEC A DDX: NORMAL
LAB AP SPEC A MORPHOLOGY: NORMAL
LAB AP SPEC A PROCEDURE: NORMAL
LAB AP SPEC B DDX: NORMAL
LAB AP SPEC B MORPHOLOGY: NORMAL
LAB AP SPEC B PROCEDURE: NORMAL
LAB AP SPEC C DDX: NORMAL
LAB AP SPEC C MORPHOLOGY: NORMAL
LAB AP SPEC C PROCEDURE: NORMAL
LAB AP SPEC D DDX: NORMAL
LAB AP SPEC D MORPHOLOGY: NORMAL
LAB AP SPEC D PROCEDURE: NORMAL
LAB AP SPEC E DDX: NORMAL
LAB AP SPEC E MORPHOLOGY: NORMAL
LAB AP SPEC E PROCEDURE: NORMAL
T3RU NFR SERPL: NORMAL %

## 2023-09-05 RX ORDER — DOXYCYCLINE 100 MG/1
100 CAPSULE ORAL 2 TIMES DAILY
Qty: 28 CAPSULE | Refills: 0 | Status: SHIPPED | OUTPATIENT
Start: 2023-09-05 | End: 2023-09-19

## 2023-09-14 ENCOUNTER — CLINICAL SUPPORT (OUTPATIENT)
Dept: DERMATOLOGY | Facility: CLINIC | Age: 73
End: 2023-09-14
Payer: MEDICARE

## 2023-09-14 DIAGNOSIS — Z48.02 ENCOUNTER FOR REMOVAL OF SUTURES: Primary | ICD-10-CM

## 2023-09-14 PROCEDURE — 87070 CULTURE OTHR SPECIMN AEROBIC: CPT | Mod: ,,, | Performed by: CLINICAL MEDICAL LABORATORY

## 2023-09-14 PROCEDURE — 87070 CULTURE, WOUND: ICD-10-PCS | Mod: ,,, | Performed by: CLINICAL MEDICAL LABORATORY

## 2023-09-14 NOTE — PROGRESS NOTES
[facility]   Vicky Colunga MD    Patient Name: Theodore Begum  Patient YOB: 1950   Date of Service: 9/14/23    CC: Suture Removal    HPI: Theodore Begum is a 73 y.o. male here today for suture removal on the located on the left dorsal wrist, right mid dorsal forearm, the superior right upper back, and inferior right upper back.  The areas are healing well.  Patient states lesion on the left dorsal wrist had a moderate amount of purulent drainage earlier this week, but denies fever, chills, puss, or redness to the 3 other areas.    Past Medical History:   Diagnosis Date    A-fib     Hyperlipidemia     Hypertension      Past Surgical History:   Procedure Laterality Date    ABDOMINAL AORTA STENT      CARDIOVERSION      CHOLECYSTECTOMY      HERNIA REPAIR      PROSTATE SURGERY      REPLACEMENT OF STENT       Review of patient's allergies indicates:   Allergen Reactions    Moxifloxacin Shortness Of Breath    Cefaclor Other (See Comments)     Abdominal pain       Current Outpatient Medications:     albuterol (VENTOLIN HFA) 90 mcg/actuation inhaler, Inhale 2 puffs into the lungs every 6 (six) hours as needed for Wheezing. Rescue, Disp: 18 g, Rfl: 5    amiodarone (PACERONE) 200 MG Tab, Take 1 tablet by mouth once daily., Disp: , Rfl:     apixaban (ELIQUIS) 5 mg Tab, Take 1 tablet by mouth 2 (two) times daily., Disp: , Rfl:     aspirin (ECOTRIN) 81 MG EC tablet, Take 81 mg by mouth once daily., Disp: , Rfl:     clobetasoL (TEMOVATE) 0.05 % external solution, Apply to AA on scalp BID PRN flares tapering with improvement, Disp: 50 mL, Rfl: 3    doxycycline (VIBRAMYCIN) 100 MG Cap, Take 1 capsule (100 mg total) by mouth 2 (two) times a day. for 14 days, Disp: 28 capsule, Rfl: 0    FARXIGA 10 mg tablet, Take 10 mg by mouth once daily., Disp: , Rfl:     furosemide (LASIX) 40 MG tablet, Take 40 mg by mouth every morning., Disp: , Rfl:     gabapentin (NEURONTIN) 400 MG capsule, Take 1 capsule (400 mg total) by  mouth 3 (three) times daily. For NERVE PAIN, Disp: 270 capsule, Rfl: 1    ketoconazole (NIZORAL) 2 % shampoo, Use as a scalp treatment 2-3 times a week for maintenance, massaging into scalp and leaving on for up to 3 minutes before rinsing, Disp: 120 mL, Rfl: 11    losartan (COZAAR) 100 MG tablet, TAKE 1 TABLET(100 MG) BY MOUTH EVERY DAY FOR BLOOD PRESSURE, Disp: 90 tablet, Rfl: 1    metoprolol succinate (TOPROL-XL) 25 MG 24 hr tablet, Take 25 mg by mouth once daily., Disp: , Rfl:     metoprolol succinate (TOPROL-XL) 50 MG 24 hr tablet, Take 50 mg by mouth., Disp: , Rfl:     omeprazole (PRILOSEC) 20 MG capsule, Take 1 capsule by mouth once daily., Disp: , Rfl:     oxybutynin (DITROPAN-XL) 10 MG 24 hr tablet, Take 10 mg by mouth., Disp: , Rfl:     rosuvastatin (CRESTOR) 20 MG tablet, Take 1 tablet (20 mg total) by mouth once daily., Disp: 90 tablet, Rfl: 1    spironolactone (ALDACTONE) 25 MG tablet, Take 25 mg by mouth once daily., Disp: , Rfl:       Exam: A focused skin exam was performed. All areas examined were normal except as mentioned in the assessment and plan below.  General Appearance of the patient is well developed and well nourished.  Orientation: alert and oriented x 3.  Mood and affect: pleasant.    Assessment:   The encounter diagnosis was Encounter for removal of sutures.    Plan:   Suture Removal (Global Period)  Body Locations: Left dorsal wrist, right mid dorsal forearm, superior right upper back, and inferior right upper back.    The examination of the sites were clean, dry and intact. Sutures were removed from all four locations. Mupirocin applied.      I counseled the patient regarding the following:  Expectations: Sutured wounds tend to have 50% of the strength of normal skin at the time of suture removal. Try avoiding rigorous exercise or lifting greater than 10 pounds.  Contact office if: you develop pain, redness, tenderness or pus at the surgical site.    A culture was obtained from all  areas           Follow up if symptoms worsen or fail to improve.    Vicky Colunga MD

## 2023-09-16 LAB
MICROORGANISM SPEC CULT: NO GROWTH

## 2023-09-29 NOTE — PROGRESS NOTES
[facility] AWV        PATIENT NAME: Theodore Begum   : 1950    AGE: 73 y.o. DATE: 10/03/2023   MRN: [id number]        Reason for Visit / Chief Complaint: Medicare AWV (Subsequent Medicare  AWV)        Theodore Begum presents for a subsequent Medicare AWV today.     The following components were reviewed and updated:    Medical/Social/Family History:  Past Medical History:   Diagnosis Date    A-fib     Hyperlipidemia     Hypertension         Family History   Problem Relation Age of Onset    No Known Problems Mother     No Known Problems Father         Social History     Tobacco Use   Smoking Status Every Day    Current packs/day: 1.00    Average packs/day: 1 pack/day for 53.0 years (53.0 ttl pk-yrs)    Types: Cigarettes    Start date: 10/10/1970    Passive exposure: Current   Smokeless Tobacco Never   Tobacco Comments    Long term smoker      Social History     Substance and Sexual Activity   Alcohol Use Never       Family History   Problem Relation Age of Onset    No Known Problems Mother     No Known Problems Father        Past Surgical History:   Procedure Laterality Date    ABDOMINAL AORTA STENT      CARDIOVERSION      CHOLECYSTECTOMY      HERNIA REPAIR      PROSTATE SURGERY      REPLACEMENT OF STENT           Allergies and Current Medications     Review of patient's allergies indicates:   Allergen Reactions    Moxifloxacin Shortness Of Breath    Cefaclor Other (See Comments)     Abdominal pain       Current Outpatient Medications:     albuterol (VENTOLIN HFA) 90 mcg/actuation inhaler, Inhale 2 puffs into the lungs every 6 (six) hours as needed for Wheezing. Rescue, Disp: 18 g, Rfl: 5    amiodarone (PACERONE) 200 MG Tab, Take 1 tablet by mouth once daily., Disp: , Rfl:     apixaban (ELIQUIS) 5 mg Tab, Take 1 tablet by mouth 2 (two) times daily., Disp: , Rfl:     aspirin (ECOTRIN) 81 MG EC tablet, Take 81 mg by mouth once daily., Disp: , Rfl:     clobetasoL (TEMOVATE) 0.05 % external solution, Apply to AA on  scalp BID PRN flares tapering with improvement, Disp: 50 mL, Rfl: 3    FARXIGA 10 mg tablet, Take 10 mg by mouth once daily., Disp: , Rfl:     furosemide (LASIX) 40 MG tablet, Take 40 mg by mouth every morning., Disp: , Rfl:     ketoconazole (NIZORAL) 2 % shampoo, Use as a scalp treatment 2-3 times a week for maintenance, massaging into scalp and leaving on for up to 3 minutes before rinsing, Disp: 120 mL, Rfl: 11    losartan (COZAAR) 100 MG tablet, TAKE 1 TABLET(100 MG) BY MOUTH EVERY DAY FOR BLOOD PRESSURE, Disp: 90 tablet, Rfl: 1    metoprolol succinate (TOPROL-XL) 25 MG 24 hr tablet, Take 25 mg by mouth once daily., Disp: , Rfl:     omeprazole (PRILOSEC) 20 MG capsule, Take 1 capsule by mouth once daily., Disp: , Rfl:     oxybutynin (DITROPAN) 5 MG Tab, Take 5 mg by mouth every evening., Disp: , Rfl:     oxybutynin (DITROPAN-XL) 10 MG 24 hr tablet, Take 10 mg by mouth., Disp: , Rfl:     rosuvastatin (CRESTOR) 20 MG tablet, Take 1 tablet (20 mg total) by mouth once daily., Disp: 90 tablet, Rfl: 1    spironolactone (ALDACTONE) 25 MG tablet, Take 25 mg by mouth once daily., Disp: , Rfl:     gabapentin (NEURONTIN) 400 MG capsule, Take 1 capsule (400 mg total) by mouth 3 (three) times daily. For NERVE PAIN (Patient not taking: Reported on 10/2/2023), Disp: 270 capsule, Rfl: 1    metoprolol succinate (TOPROL-XL) 50 MG 24 hr tablet, Take 50 mg by mouth., Disp: , Rfl:     Health Risk Assessment   Fall Risk: yes        Obesity: BMI Body mass index is 26.2 kg/m².   Advance Directive:  Does not have an advanced directive. Verbal education and written education included in today's AVS.   Depression: PHQ9 0    HTN:DASH diet, exercise, weight management, med compliance, home BP monitoring, and follow-up discussed.   T2DM: diabetic diet, glucose monitoring, activity level, weight management, med compliance, and follow-up discussed.  STI: not high risk    Statin Use: yes      Health Maintenance   Last eye exam:9/5/2023   Last  CV screen with lipids: 2023   Diabetes screening with fasting glucose or A1c: 2023              DEXA: na   Last PSA screen: 2023   Colonoscopy: 10/28/2016 repeat in 3 years              Flu Vaccine: state took a week ago   Pneumonia vaccines: 13-10/09/2020  -2022   COVID vaccine: states had all              Hep B vaccine: na           AAA screenin2022  HIV Screeing: not high risk  Hepatitis C Screen: order today  Low Dose CT Scan: declined today visit      Incontinence  Bowel: no  Bladder: no      Health Maintenance Topics with due status: Not Due       Topic Last Completion Date    PROSTATE-SPECIFIC ANTIGEN 2023    Diabetes Urine Screening 2023    Lipid Panel 2023    Hemoglobin A1c 2023    Eye Exam 08/10/2023    High Dose Statin 10/02/2023     Health Maintenance Due   Topic Date Due    Hepatitis C Screening  Never done    Foot Exam  Never done    TETANUS VACCINE  Never done    Shingles Vaccine (1 of 2) Never done    LDCT Lung Screen  Never done    Colorectal Cancer Screening  10/28/2019    COVID-19 Vaccine (5 - Pfizer series) 10/08/2022    Influenza Vaccine (1) 2023         Lab results available in Epic or see dates from [facility] above:   Lab Results   Component Value Date    CHOL 96 2023    CHOL 113 2022    CHOL 183 2021     Lab Results   Component Value Date    HDL 42 2023    HDL 44 2022    HDL 42 2021     Lab Results   Component Value Date    LDLCALC 41 2023    LDLCALC 45 2022    LDLCALC 122 2021     Lab Results   Component Value Date    TRIG 67 2023    TRIG 121 2022    TRIG 95 2021     Lab Results   Component Value Date    CHOLHDL 2.3 2023    CHOLHDL 2.6 2022    CHOLHDL 4.4 2021       Lab Results   Component Value Date    HGBA1C 5.8 2023       Sodium   Date Value Ref Range Status   2022 143 136 - 145 mmol/L Final     Potassium   Date  "Value Ref Range Status   04/05/2022 4.0 3.5 - 5.1 mmol/L Final     Chloride   Date Value Ref Range Status   04/05/2022 106 98 - 107 mmol/L Final     CO2   Date Value Ref Range Status   04/05/2022 34 (H) 21 - 32 mmol/L Final     Glucose   Date Value Ref Range Status   04/05/2022 108 (H) 74 - 106 mg/dL Final     BUN   Date Value Ref Range Status   04/05/2022 22 (H) 7 - 18 mg/dL Final     Creatinine   Date Value Ref Range Status   04/05/2022 1.48 (H) 0.70 - 1.30 mg/dL Final     Calcium   Date Value Ref Range Status   04/05/2022 9.1 8.5 - 10.1 mg/dL Final     Anion Gap   Date Value Ref Range Status   04/05/2022 7 7 - 16 mmol/L Final     eGFR   Date Value Ref Range Status   04/05/2022 50 (L) >=60 mL/min/1.73m² Final         Lab Results   Component Value Date    PSA <0.010 05/05/2023           Care Team   PCP: Dr. Alex         **See Completed Assessments for Annual Wellness visit within the encounter summary    The following assessments were completed & reviewed:  Depression Screening  Cognitive function Screening  Timed Get Up Test  Whisper Test  Vision Screen  Health Risk Assessment  Checklist of ADLs and IADLs          Objective  Vitals:    10/02/23 1441   BP: 120/66   Pulse: (!) 46   Resp: 14   Temp: 99 °F (37.2 °C)   SpO2: (!) 94%   Weight: 90.1 kg (198 lb 9.6 oz)   Height: 6' 1" (1.854 m)   PainSc: 0-No pain      Body mass index is 26.2 kg/m².  Ideal body weight: 79.9 kg (176 lb 2.4 oz)       Physical Exam  Vitals and nursing note reviewed.   Constitutional:       General: He is not in acute distress.     Appearance: Normal appearance. He is not ill-appearing.   Eyes:      Extraocular Movements: Extraocular movements intact.      Pupils: Pupils are equal, round, and reactive to light.   Cardiovascular:      Rate and Rhythm: Normal rate and regular rhythm.      Heart sounds: Normal heart sounds.   Pulmonary:      Effort: Pulmonary effort is normal.      Breath sounds: Normal breath sounds.   Abdominal:      General: " Bowel sounds are normal.      Palpations: Abdomen is soft.   Musculoskeletal:         General: Normal range of motion.   Skin:     Findings: No rash.   Neurological:      General: No focal deficit present.      Mental Status: He is alert and oriented to person, place, and time. Mental status is at baseline.   Psychiatric:         Mood and Affect: Mood normal.         Behavior: Behavior normal.           Assessment:     1. Encounter for subsequent annual wellness visit (AWV) in Medicare patient  - Hepatitis C Antibody; Future    2. Mixed hyperlipidemia    3. Essential hypertension    4. Type 2 diabetes mellitus with other circulatory complication, without long-term current use of insulin    5. Idiopathic peripheral neuropathy    6. Abdominal aortic aneurysm (AAA) without rupture, unspecified part    7. Stage 3a chronic kidney disease    8. BMI 26.0-26.9,adult    9. Actinic keratosis       Problem List Items Addressed This Visit          Neuro    Idiopathic peripheral neuropathy (Chronic)       Derm    Actinic keratosis (Chronic)       Cardiac/Vascular    Mixed hyperlipidemia (Chronic)    Essential hypertension (Chronic)    Abdominal aortic aneurysm (AAA) without rupture, unspecified part (Chronic)       Renal/    Stage 3a chronic kidney disease (Chronic)       Endocrine    Type 2 diabetes mellitus with other circulatory complication, without long-term current use of insulin (Chronic)     Other Visit Diagnoses       Encounter for subsequent annual wellness visit (AWV) in Medicare patient    -  Primary    Relevant Orders    Hepatitis C Antibody    BMI 26.0-26.9,adult                     Plan:  Encounter for subsequent annual wellness visit (AWV) in Medicare patient  -     Hepatitis C Antibody; Future; Expected date: 10/02/2023    Mixed hyperlipidemia    Essential hypertension    Type 2 diabetes mellitus with other circulatory complication, without long-term current use of insulin    Idiopathic peripheral  neuropathy    Abdominal aortic aneurysm (AAA) without rupture, unspecified part    Stage 3a chronic kidney disease    BMI 26.0-26.9,adult    Actinic keratosis          Referrals:        Advised to call office if does not hear from anyone with referral appt within 2-3 weeks to check on status of referral. Voiced understanding      Discussed and provided with a screening schedule and personal prevention plan in accordance with USPSTF age appropriate recommendations and Medicare screening guidelines.   Education, counseling, and referrals were provided as needed.  After Visit Summary printed and given to patient which includes written education and a list of any referrals if indicated.     Education including diet, exercise, falls and advanced directives discussed with patient and patient verbalized understanding.     F/u plan for yearly AWV.    Signature: Joseph Alex MD      I offered to discuss advanced care planning, including how to pick a person who would make decisions for you if you were unable to make them for yourself, called a health care power of , and what kind of decisions you might make such as use of life sustaining treatments such as ventilators and tube feeding when faced with a life limiting illness recorded on a living will that they will need to know. (How you want to be cared for as you near the end of your natural life)     X Patient is interested in learning more about how to make advanced directives.  I provided them paperwork and offered to discuss this with them.

## 2023-10-02 ENCOUNTER — OFFICE VISIT (OUTPATIENT)
Dept: FAMILY MEDICINE | Facility: CLINIC | Age: 73
End: 2023-10-02
Payer: MEDICARE

## 2023-10-02 VITALS
WEIGHT: 198.63 LBS | BODY MASS INDEX: 26.33 KG/M2 | RESPIRATION RATE: 14 BRPM | HEART RATE: 46 BPM | TEMPERATURE: 99 F | SYSTOLIC BLOOD PRESSURE: 120 MMHG | HEIGHT: 73 IN | OXYGEN SATURATION: 94 % | DIASTOLIC BLOOD PRESSURE: 66 MMHG

## 2023-10-02 DIAGNOSIS — G60.9 IDIOPATHIC PERIPHERAL NEUROPATHY: Chronic | ICD-10-CM

## 2023-10-02 DIAGNOSIS — Z00.00 ENCOUNTER FOR SUBSEQUENT ANNUAL WELLNESS VISIT (AWV) IN MEDICARE PATIENT: Primary | ICD-10-CM

## 2023-10-02 DIAGNOSIS — N18.31 STAGE 3A CHRONIC KIDNEY DISEASE: Chronic | ICD-10-CM

## 2023-10-02 DIAGNOSIS — E78.2 MIXED HYPERLIPIDEMIA: Chronic | ICD-10-CM

## 2023-10-02 DIAGNOSIS — E11.59 TYPE 2 DIABETES MELLITUS WITH OTHER CIRCULATORY COMPLICATION, WITHOUT LONG-TERM CURRENT USE OF INSULIN: Chronic | ICD-10-CM

## 2023-10-02 DIAGNOSIS — I10 ESSENTIAL HYPERTENSION: Chronic | ICD-10-CM

## 2023-10-02 DIAGNOSIS — I71.40 ABDOMINAL AORTIC ANEURYSM (AAA) WITHOUT RUPTURE, UNSPECIFIED PART: Chronic | ICD-10-CM

## 2023-10-02 DIAGNOSIS — L57.0 ACTINIC KERATOSIS: Chronic | ICD-10-CM

## 2023-10-02 PROCEDURE — G0439 PPPS, SUBSEQ VISIT: HCPCS | Mod: ,,, | Performed by: FAMILY MEDICINE

## 2023-10-02 PROCEDURE — G0439 PR MEDICARE ANNUAL WELLNESS SUBSEQUENT VISIT: ICD-10-PCS | Mod: ,,, | Performed by: FAMILY MEDICINE

## 2023-10-02 RX ORDER — OXYBUTYNIN CHLORIDE 5 MG/1
5 TABLET ORAL NIGHTLY
COMMUNITY
Start: 2023-09-11

## 2023-10-02 NOTE — PATIENT INSTRUCTIONS
Counseling and Referral of Other Preventative  (Italic type indicates deductible and co-insurance are waived)    Patient Name: Theodore Begum  Today's Date: 10/2/2023    Health Maintenance       Date Due Completion Date    Hepatitis C Screening Never done ---    Foot Exam Never done ---    TETANUS VACCINE Never done ---    Shingles Vaccine (1 of 2) Never done ---    LDCT Lung Screen Never done ---    Colorectal Cancer Screening 10/28/2019 10/28/2016    COVID-19 Vaccine (5 - Pfizer series) 10/08/2022 6/8/2022    Influenza Vaccine (1) 09/01/2023 10/13/2022    Override on 10/13/2022: Done    PROSTATE-SPECIFIC ANTIGEN 05/05/2024 5/5/2023    Diabetes Urine Screening 05/05/2024 5/5/2023    Lipid Panel 05/05/2024 5/5/2023    Hemoglobin A1c 05/05/2024 5/5/2023    High Dose Statin 07/28/2024 7/28/2023    Eye Exam 08/10/2024 8/10/2023        No orders of the defined types were placed in this encounter.      The following information is provided to all patients.  This information is to help you find resources for any of the problems found today that may be affecting your health:                Living healthy guide: www.[facility].[place].gov      Understanding Diabetes: www.diabetes.org      Eating healthy: www.cdc.gov/healthyweight      CDC home safety checklist: www.cdc.gov/steadi/patient.html      Agency on Aging: www.goea.[place].[facility]      Alcoholics anonymous (AA): www.aa.org      Physical Activity: www.gerardo.nih.gov/dw7lfgw      Tobacco use: www.quitwithusla.org

## 2023-10-03 PROBLEM — I50.32 CHRONIC HEART FAILURE WITH PRESERVED EJECTION FRACTION (HFPEF): Status: ACTIVE | Noted: 2023-08-21

## 2023-10-03 PROBLEM — I50.43 ACUTE ON CHRONIC COMBINED SYSTOLIC AND DIASTOLIC HEART FAILURE: Chronic | Status: ACTIVE | Noted: 2022-08-08

## 2023-10-03 PROBLEM — I50.43 ACUTE ON CHRONIC COMBINED SYSTOLIC AND DIASTOLIC HEART FAILURE: Status: ACTIVE | Noted: 2022-08-08

## 2023-10-03 PROBLEM — I50.32 CHRONIC HEART FAILURE WITH PRESERVED EJECTION FRACTION (HFPEF): Chronic | Status: ACTIVE | Noted: 2023-08-21

## 2023-10-04 DIAGNOSIS — J41.0 SIMPLE CHRONIC BRONCHITIS: Chronic | ICD-10-CM

## 2023-10-05 RX ORDER — ALBUTEROL SULFATE 90 UG/1
AEROSOL, METERED RESPIRATORY (INHALATION)
Qty: 18 G | Refills: 5 | Status: SHIPPED | OUTPATIENT
Start: 2023-10-05

## 2023-11-02 DIAGNOSIS — E78.2 MIXED HYPERLIPIDEMIA: Chronic | ICD-10-CM

## 2023-11-02 RX ORDER — ROSUVASTATIN CALCIUM 20 MG/1
20 TABLET, COATED ORAL DAILY
Qty: 90 TABLET | Refills: 1 | Status: SHIPPED | OUTPATIENT
Start: 2023-11-02 | End: 2024-01-29 | Stop reason: SDUPTHER

## 2023-12-08 ENCOUNTER — OFFICE VISIT (OUTPATIENT)
Dept: FAMILY MEDICINE | Facility: CLINIC | Age: 73
End: 2023-12-08
Payer: MEDICARE

## 2023-12-08 VITALS
TEMPERATURE: 98 F | SYSTOLIC BLOOD PRESSURE: 138 MMHG | DIASTOLIC BLOOD PRESSURE: 74 MMHG | HEART RATE: 60 BPM | OXYGEN SATURATION: 95 % | RESPIRATION RATE: 16 BRPM | HEIGHT: 73 IN | WEIGHT: 205.38 LBS | BODY MASS INDEX: 27.22 KG/M2

## 2023-12-08 DIAGNOSIS — D69.6 THROMBOCYTOPENIA, UNSPECIFIED: Chronic | ICD-10-CM

## 2023-12-08 DIAGNOSIS — Z12.5 ENCOUNTER FOR SCREENING FOR MALIGNANT NEOPLASM OF PROSTATE: ICD-10-CM

## 2023-12-08 DIAGNOSIS — J41.0 SIMPLE CHRONIC BRONCHITIS: Chronic | ICD-10-CM

## 2023-12-08 DIAGNOSIS — J44.1 COPD WITH ACUTE EXACERBATION: Primary | Chronic | ICD-10-CM

## 2023-12-08 DIAGNOSIS — D50.9 MICROCYTIC ANEMIA: ICD-10-CM

## 2023-12-08 DIAGNOSIS — I10 ESSENTIAL HYPERTENSION: ICD-10-CM

## 2023-12-08 PROBLEM — E11.59 TYPE 2 DIABETES MELLITUS WITH OTHER CIRCULATORY COMPLICATION, WITHOUT LONG-TERM CURRENT USE OF INSULIN: Chronic | Status: RESOLVED | Noted: 2023-05-05 | Resolved: 2023-12-08

## 2023-12-08 LAB
BACTERIA #/AREA URNS HPF: ABNORMAL /HPF
BASOPHILS # BLD AUTO: 0.04 K/UL (ref 0–0.2)
BASOPHILS NFR BLD AUTO: 0.6 % (ref 0–1)
BILIRUB UR QL STRIP: NEGATIVE
CHOLEST SERPL-MCNC: 85 MG/DL (ref 0–200)
CHOLEST/HDLC SERPL: 1.9 {RATIO}
CLARITY UR: CLEAR
COLOR UR: YELLOW
CREAT UR-MCNC: 45 MG/DL (ref 39–259)
DIFFERENTIAL METHOD BLD: ABNORMAL
EOSINOPHIL # BLD AUTO: 0.08 K/UL (ref 0–0.5)
EOSINOPHIL NFR BLD AUTO: 1.2 % (ref 1–4)
ERYTHROCYTE [DISTWIDTH] IN BLOOD BY AUTOMATED COUNT: 16.8 % (ref 11.5–14.5)
GLUCOSE UR STRIP-MCNC: 1000 MG/DL
HCT VFR BLD AUTO: 36.1 % (ref 40–54)
HDLC SERPL-MCNC: 45 MG/DL (ref 40–60)
HGB BLD-MCNC: 10.9 G/DL (ref 13.5–18)
IMM GRANULOCYTES # BLD AUTO: 0.02 K/UL (ref 0–0.04)
IMM GRANULOCYTES NFR BLD: 0.3 % (ref 0–0.4)
KETONES UR STRIP-SCNC: NEGATIVE MG/DL
LDLC SERPL CALC-MCNC: 20 MG/DL
LEUKOCYTE ESTERASE UR QL STRIP: NEGATIVE
LYMPHOCYTES # BLD AUTO: 1.66 K/UL (ref 1–4.8)
LYMPHOCYTES NFR BLD AUTO: 23.9 % (ref 27–41)
MCH RBC QN AUTO: 24.1 PG (ref 27–31)
MCHC RBC AUTO-ENTMCNC: 30.2 G/DL (ref 32–36)
MCV RBC AUTO: 79.7 FL (ref 80–96)
MICROALBUMIN UR-MCNC: 5 MG/DL (ref 0–2.8)
MICROALBUMIN/CREAT RATIO PNL UR: 111.1 MG/G (ref 0–30)
MONOCYTES # BLD AUTO: 0.66 K/UL (ref 0–0.8)
MONOCYTES NFR BLD AUTO: 9.5 % (ref 2–6)
MPC BLD CALC-MCNC: 11.1 FL (ref 9.4–12.4)
NEUTROPHILS # BLD AUTO: 4.49 K/UL (ref 1.8–7.7)
NEUTROPHILS NFR BLD AUTO: 64.5 % (ref 53–65)
NITRITE UR QL STRIP: NEGATIVE
NONHDLC SERPL-MCNC: 40 MG/DL
NRBC # BLD AUTO: 0 X10E3/UL
NRBC, AUTO (.00): 0 %
PH UR STRIP: 6.5 PH UNITS
PLATELET # BLD AUTO: 135 K/UL (ref 150–400)
PROT UR QL STRIP: NEGATIVE
PSA SERPL-MCNC: <0.01 NG/ML
RBC # BLD AUTO: 4.53 M/UL (ref 4.6–6.2)
RBC # UR STRIP: ABNORMAL /UL
RBC #/AREA URNS HPF: 5 /HPF
SP GR UR STRIP: 1.01
TRIGL SERPL-MCNC: 98 MG/DL (ref 35–150)
UROBILINOGEN UR STRIP-ACNC: 3 MG/DL
VLDLC SERPL-MCNC: 20 MG/DL
WBC # BLD AUTO: 6.95 K/UL (ref 4.5–11)
WBC #/AREA URNS HPF: <1 /HPF

## 2023-12-08 PROCEDURE — 85025 CBC WITH DIFFERENTIAL: ICD-10-PCS | Mod: ,,, | Performed by: CLINICAL MEDICAL LABORATORY

## 2023-12-08 PROCEDURE — 99214 OFFICE O/P EST MOD 30 MIN: CPT | Mod: ,,, | Performed by: FAMILY MEDICINE

## 2023-12-08 PROCEDURE — 99214 PR OFFICE/OUTPT VISIT, EST, LEVL IV, 30-39 MIN: ICD-10-PCS | Mod: ,,, | Performed by: FAMILY MEDICINE

## 2023-12-08 PROCEDURE — 83540 IRON AND TIBC: ICD-10-PCS | Mod: ,,, | Performed by: CLINICAL MEDICAL LABORATORY

## 2023-12-08 PROCEDURE — 80061 LIPID PANEL: ICD-10-PCS | Mod: ,,, | Performed by: CLINICAL MEDICAL LABORATORY

## 2023-12-08 PROCEDURE — G0103 PSA SCREENING: HCPCS | Mod: GZ,,, | Performed by: CLINICAL MEDICAL LABORATORY

## 2023-12-08 PROCEDURE — 85025 COMPLETE CBC W/AUTO DIFF WBC: CPT | Mod: ,,, | Performed by: CLINICAL MEDICAL LABORATORY

## 2023-12-08 PROCEDURE — 81001 URINALYSIS AUTO W/SCOPE: CPT | Mod: ,,, | Performed by: CLINICAL MEDICAL LABORATORY

## 2023-12-08 PROCEDURE — 82570 MICROALBUMIN / CREATININE RATIO URINE: ICD-10-PCS | Mod: ,,, | Performed by: CLINICAL MEDICAL LABORATORY

## 2023-12-08 PROCEDURE — 80061 LIPID PANEL: CPT | Mod: ,,, | Performed by: CLINICAL MEDICAL LABORATORY

## 2023-12-08 PROCEDURE — G0103 PSA, SCREENING: ICD-10-PCS | Mod: GZ,,, | Performed by: CLINICAL MEDICAL LABORATORY

## 2023-12-08 PROCEDURE — 81001 URINALYSIS, REFLEX TO URINE CULTURE: ICD-10-PCS | Mod: ,,, | Performed by: CLINICAL MEDICAL LABORATORY

## 2023-12-08 PROCEDURE — 83540 ASSAY OF IRON: CPT | Mod: ,,, | Performed by: CLINICAL MEDICAL LABORATORY

## 2023-12-08 PROCEDURE — 82043 UR ALBUMIN QUANTITATIVE: CPT | Mod: ,,, | Performed by: CLINICAL MEDICAL LABORATORY

## 2023-12-08 PROCEDURE — 82570 ASSAY OF URINE CREATININE: CPT | Mod: ,,, | Performed by: CLINICAL MEDICAL LABORATORY

## 2023-12-08 PROCEDURE — 83550 IRON AND TIBC: ICD-10-PCS | Mod: ,,, | Performed by: CLINICAL MEDICAL LABORATORY

## 2023-12-08 PROCEDURE — 82043 MICROALBUMIN / CREATININE RATIO URINE: ICD-10-PCS | Mod: ,,, | Performed by: CLINICAL MEDICAL LABORATORY

## 2023-12-08 PROCEDURE — 83550 IRON BINDING TEST: CPT | Mod: ,,, | Performed by: CLINICAL MEDICAL LABORATORY

## 2023-12-08 RX ORDER — PREDNISONE 20 MG/1
20 TABLET ORAL DAILY
Qty: 7 TABLET | Refills: 0 | Status: SHIPPED | OUTPATIENT
Start: 2023-12-08 | End: 2024-01-29

## 2023-12-08 RX ORDER — AZITHROMYCIN 250 MG/1
TABLET, FILM COATED ORAL
Qty: 6 TABLET | Refills: 0 | Status: SHIPPED | OUTPATIENT
Start: 2023-12-08 | End: 2023-12-13

## 2023-12-08 NOTE — PROGRESS NOTES
Joseph Alex MD    [address] [place] [address]     PATIENT NAME: Theodore Begum  : 1950  DATE: 23  MRN: [id number]      Billing Provider: Joseph Alex MD  Level of Service: DC OFFICE/OUTPT VISIT, EST, LEVL IV, 30-39 MIN  Patient PCP Information       Provider PCP Type    Joseph Alex MD General            Reason for Visit / Chief Complaint: Cough (Started coughing after lunch today and has a burning in his chest when he coughs)       Update PCP  Update Chief Complaint         History of Present Illness / Problem Focused Workflow     Theodore Begum presents to the clinic with Cough (Started coughing after lunch today and has a burning in his chest when he coughs)     Also labs for chronic health problems     Thrombocytopenia noticed on his lab work, no symptoms at this time and the number of platelets is not in the critical range.     HPI    Review of Systems     Review of Systems   Constitutional:  Negative for activity change, appetite change, chills, fatigue and fever.   HENT:  Negative for nasal congestion, ear pain, hearing loss, postnasal drip and sore throat.    Respiratory:  Positive for cough. Negative for chest tightness, shortness of breath and wheezing.    Cardiovascular:  Negative for chest pain, palpitations, leg swelling and claudication.   Gastrointestinal:  Negative for abdominal pain, change in bowel habit, constipation, diarrhea, nausea and vomiting.   Genitourinary:  Negative for dysuria.   Musculoskeletal:  Negative for arthralgias, back pain, gait problem and myalgias.   Integumentary:  Negative for rash.   Neurological:  Negative for weakness and headaches.   Psychiatric/Behavioral:  Negative for suicidal ideas. The patient is not nervous/anxious.         Medical / Social / Family History     Past Medical History:   Diagnosis Date    A-fib     Hyperlipidemia     Hypertension        Past Surgical History:   Procedure  Laterality Date    ABDOMINAL AORTA STENT      CARDIOVERSION      CHOLECYSTECTOMY      HERNIA REPAIR      PROSTATE SURGERY      REPLACEMENT OF STENT         Social History    reports that he has been smoking cigarettes. He started smoking about 53 years ago. He has a 53.2 pack-year smoking history. He has been exposed to tobacco smoke. He has never used smokeless tobacco. He reports that he does not drink alcohol and does not use drugs.    Family History  's family history includes No Known Problems in his father and mother.    Medications and Allergies     Medications  Outpatient Medications Marked as Taking for the 12/8/23 encounter (Office Visit) with Joseph Alex MD   Medication Sig Dispense Refill    albuterol (PROVENTIL/VENTOLIN HFA) 90 mcg/actuation inhaler INHALE 2 PUFFS BY MOUTH INTO THE LUNGS EVERY 6 HOURS AS NEEDED FOR WHEEZING OR RESCUE 18 g 5    apixaban (ELIQUIS) 5 mg Tab Take 1 tablet by mouth 2 (two) times daily.      aspirin (ECOTRIN) 81 MG EC tablet Take 81 mg by mouth once daily.      clobetasoL (TEMOVATE) 0.05 % external solution Apply to AA on scalp BID PRN flares tapering with improvement 50 mL 3    FARXIGA 10 mg tablet Take 10 mg by mouth once daily.      furosemide (LASIX) 40 MG tablet Take 40 mg by mouth every morning.      ketoconazole (NIZORAL) 2 % shampoo Use as a scalp treatment 2-3 times a week for maintenance, massaging into scalp and leaving on for up to 3 minutes before rinsing 120 mL 11    losartan (COZAAR) 100 MG tablet TAKE 1 TABLET(100 MG) BY MOUTH EVERY DAY FOR BLOOD PRESSURE 90 tablet 1    metoprolol succinate (TOPROL-XL) 25 MG 24 hr tablet Take 25 mg by mouth once daily.      metoprolol succinate (TOPROL-XL) 50 MG 24 hr tablet Take 50 mg by mouth.      omeprazole (PRILOSEC) 20 MG capsule Take 1 capsule by mouth once daily.      oxybutynin (DITROPAN) 5 MG Tab Take 5 mg by mouth every evening.      oxybutynin (DITROPAN-XL) 10 MG 24 hr tablet Take 10 mg by mouth.       rosuvastatin (CRESTOR) 20 MG tablet Take 1 tablet (20 mg total) by mouth once daily. For CHOLESTEROL 90 tablet 1    spironolactone (ALDACTONE) 25 MG tablet Take 25 mg by mouth once daily.         Allergies  Review of patient's allergies indicates:   Allergen Reactions    Moxifloxacin Shortness Of Breath    Cefaclor Other (See Comments)     Abdominal pain       Physical Examination     Vitals:    12/08/23 1521   BP: 138/74   Pulse: 60   Resp: 16   Temp: 98.4 °F (36.9 °C)     Physical Exam  Vitals and nursing note reviewed.   Constitutional:       General: He is not in acute distress.     Appearance: Normal appearance. He is not ill-appearing.   Eyes:      Extraocular Movements: Extraocular movements intact.      Pupils: Pupils are equal, round, and reactive to light.   Cardiovascular:      Rate and Rhythm: Normal rate and regular rhythm.      Heart sounds: Normal heart sounds.   Pulmonary:      Effort: Pulmonary effort is normal.      Breath sounds: Normal breath sounds.   Abdominal:      General: Bowel sounds are normal.      Palpations: Abdomen is soft.   Musculoskeletal:         General: Normal range of motion.   Skin:     Findings: No rash.   Neurological:      General: No focal deficit present.      Mental Status: He is alert and oriented to person, place, and time. Mental status is at baseline.   Psychiatric:         Mood and Affect: Mood normal.         Behavior: Behavior normal.            Assessment and Plan (including Health Maintenance)      Problem List  Smart Sets  Document Outside HM   :    Plan:     Unsure the etiology or course of his current symptoms, they started about 3 hours before our clinic visit, he does have COPD and continues to smoke therefore I chose to be aggressive with treatment         Health Maintenance Due   Topic Date Due    Hepatitis C Screening  Never done    Foot Exam  Never done    TETANUS VACCINE  Never done    Shingles Vaccine (1 of 2) Never done    LDCT Lung Screen  Never  done    RSV Vaccine (Age 60+ and Pregnant patients) (1 - 1-dose 60+ series) Never done    Colorectal Cancer Screening  10/28/2019    Influenza Vaccine (1) 09/01/2023    COVID-19 Vaccine (5 - 2023-24 season) 09/01/2023    Hemoglobin A1c  11/05/2023       Problem List Items Addressed This Visit          Pulmonary    Simple chronic bronchitis (Chronic)    COPD with acute exacerbation - Primary (Chronic)    Relevant Medications    predniSONE (DELTASONE) 20 MG tablet    azithromycin (Z-JOSE) 250 MG tablet       Cardiac/Vascular    Essential hypertension (Chronic)    Overview      - Goal blood pressure for most patients is less than 130/85. Both numbers are important. If you have questions about your specific goal blood pressure, please ask at your clinic visits.   - Check blood pressure outside of clinic, record the numbers, and bring the log to all your office visits.   - If you have any chest pain, SOB, or other concerning symptoms, report these immediately, or go to the nearest ER.    - Recommend cardiovascular exercise, at least 3 times per week, for at least 15 minutes.   - Eat a heart healthy diet.            Relevant Orders    Microalbumin/Creatinine Ratio, Urine (Completed)    PSA, Screening (Completed)    Lipid Panel (Completed)    CBC Auto Differential (Completed)    Urinalysis, Reflex to Urine Culture (Completed)    Urinalysis Microscopic (Completed)       Hematology    Thrombocytopenia, unspecified     Other Visit Diagnoses       Encounter for screening for malignant neoplasm of prostate        Relevant Orders    PSA, Screening (Completed)            Health Maintenance Topics with due status: Not Due       Topic Last Completion Date    Eye Exam 08/10/2023    PROSTATE-SPECIFIC ANTIGEN 12/08/2023    High Dose Statin 12/08/2023    Diabetes Urine Screening 12/08/2023    Lipid Panel 12/08/2023       Procedures     Future Appointments   Date Time Provider Department Center   2/7/2024  4:00 PM Vicky Colunga MD [facility]  [place]   10/4/2024  2:00 PM AWV NURSE, [facility] [place] Doreen        Follow up in about 3 months (around 3/8/2024) for chronic health problems, hypertension.     Signature:  Joseph Alex MD  [address] [place] [address]  Phone #: [phone]  Fax #: [phone]    Date of encounter: 12/8/23    There are no Patient Instructions on file for this visit.

## 2023-12-08 NOTE — ASSESSMENT & PLAN NOTE
- Check glucose outside of clinic, record numbers, and bring log to follow up visit.    - Take medications as directed, and bring all medications to every clinic appointment.    - Eat a diabetic diet, if there are concerns about what that entails, we have a diabetic educator in our clinic.    - Cardiovascular exercise at least 3 times per week, for at least 15 minutes.    - Patient should be on a Statin medication, unless patient cannot tolerate a Statin.    - Aspirin should be taken everyday,  81mg, unless a higher dose is indicated for other medicaiton conditions. Also do not recommend Aspirin for a patient with known adverse effects from Aspirin.    - Recommend yearly, dilated eye exams for all Diabetic Patients.    - Monitor feet for calluses, abrasion, or other abnormalities. Report any concerns at every clinic visit.    -   Hemoglobin A1C   Date Value Ref Range Status   05/05/2023 5.8 4.5 - 6.6 % Final     Comment:       Normal:               <5.7%  Pre-Diabetic:       5.7% to 6.4%  Diabetic:             >6.4%  Diabetic Goal:     <7%

## 2023-12-09 DIAGNOSIS — Z71.89 COMPLEX CARE COORDINATION: ICD-10-CM

## 2023-12-11 ENCOUNTER — TELEPHONE (OUTPATIENT)
Dept: FAMILY MEDICINE | Facility: CLINIC | Age: 73
End: 2023-12-11
Payer: MEDICARE

## 2023-12-11 DIAGNOSIS — D50.9 MICROCYTIC ANEMIA: Primary | ICD-10-CM

## 2023-12-11 PROBLEM — J44.1 COPD WITH ACUTE EXACERBATION: Chronic | Status: ACTIVE | Noted: 2023-12-11

## 2023-12-11 PROBLEM — D69.6 THROMBOCYTOPENIA, UNSPECIFIED: Status: ACTIVE | Noted: 2023-12-11

## 2023-12-11 NOTE — TELEPHONE ENCOUNTER
----- Message from Joseph Alex MD sent at 12/11/2023  7:50 AM CST -----  Moderate microcytic anemia, add iron studies     Otherwise acceptable labs

## 2023-12-12 DIAGNOSIS — D50.9 MICROCYTIC ANEMIA: Primary | ICD-10-CM

## 2023-12-12 LAB
IRON SATN MFR SERPL: 5 % (ref 14–50)
IRON SERPL-MCNC: 23 ΜG/DL (ref 65–175)
TIBC SERPL-MCNC: 482 ΜG/DL (ref 250–450)

## 2023-12-12 NOTE — TELEPHONE ENCOUNTER
Per Dr. Alex start slow fe one tablet by mouth twice daily, monitor for constipation. Patient gave verbal understanding.

## 2024-01-29 ENCOUNTER — OFFICE VISIT (OUTPATIENT)
Dept: FAMILY MEDICINE | Facility: CLINIC | Age: 74
End: 2024-01-29
Payer: MEDICARE

## 2024-01-29 VITALS
HEIGHT: 73 IN | WEIGHT: 198 LBS | RESPIRATION RATE: 20 BRPM | BODY MASS INDEX: 26.24 KG/M2 | HEART RATE: 60 BPM | SYSTOLIC BLOOD PRESSURE: 197 MMHG | TEMPERATURE: 99 F | DIASTOLIC BLOOD PRESSURE: 83 MMHG

## 2024-01-29 DIAGNOSIS — J41.0 SIMPLE CHRONIC BRONCHITIS: Chronic | ICD-10-CM

## 2024-01-29 DIAGNOSIS — D50.9 MICROCYTIC ANEMIA: Chronic | ICD-10-CM

## 2024-01-29 DIAGNOSIS — I50.32 CHRONIC HEART FAILURE WITH PRESERVED EJECTION FRACTION (HFPEF): Chronic | ICD-10-CM

## 2024-01-29 DIAGNOSIS — D50.8 OTHER IRON DEFICIENCY ANEMIA: Chronic | ICD-10-CM

## 2024-01-29 DIAGNOSIS — I48.91 ATRIAL FIBRILLATION WITH RVR: Chronic | ICD-10-CM

## 2024-01-29 DIAGNOSIS — I50.43 ACUTE ON CHRONIC COMBINED SYSTOLIC AND DIASTOLIC HEART FAILURE: Chronic | ICD-10-CM

## 2024-01-29 DIAGNOSIS — D69.6 THROMBOCYTOPENIA, UNSPECIFIED: Chronic | ICD-10-CM

## 2024-01-29 DIAGNOSIS — R73.03 PREDIABETES: Chronic | ICD-10-CM

## 2024-01-29 DIAGNOSIS — L89.892 PRESSURE INJURY OF TOE OF RIGHT FOOT, STAGE 2: Chronic | ICD-10-CM

## 2024-01-29 DIAGNOSIS — C61 MALIGNANT NEOPLASM OF PROSTATE: Chronic | ICD-10-CM

## 2024-01-29 DIAGNOSIS — N18.31 STAGE 3A CHRONIC KIDNEY DISEASE: Chronic | ICD-10-CM

## 2024-01-29 DIAGNOSIS — E78.2 MIXED HYPERLIPIDEMIA: Chronic | ICD-10-CM

## 2024-01-29 DIAGNOSIS — I71.40 ABDOMINAL AORTIC ANEURYSM (AAA) WITHOUT RUPTURE, UNSPECIFIED PART: Chronic | ICD-10-CM

## 2024-01-29 DIAGNOSIS — I10 ESSENTIAL HYPERTENSION: Primary | Chronic | ICD-10-CM

## 2024-01-29 PROBLEM — E85.89 OTHER AMYLOIDOSIS: Chronic | Status: RESOLVED | Noted: 2022-08-22 | Resolved: 2024-01-29

## 2024-01-29 PROCEDURE — 99214 OFFICE O/P EST MOD 30 MIN: CPT | Mod: ,,, | Performed by: FAMILY MEDICINE

## 2024-01-29 RX ORDER — ROSUVASTATIN CALCIUM 20 MG/1
20 TABLET, COATED ORAL DAILY
Qty: 90 TABLET | Refills: 1 | Status: SHIPPED | OUTPATIENT
Start: 2024-01-29

## 2024-01-29 NOTE — PROGRESS NOTES
Joseph Alex MD    [address] [place] [address]     PATIENT NAME: Theodore Begum  : 1950  DATE: 24  MRN: [id number]      Billing Provider: Joseph Alex MD  Level of Service: [place] OFFICE/OUTPT VISIT, EST, LEVL IV, 30-39 MIN  Patient PCP Information       Provider PCP Type    Joseph Alex MD General            Reason for Visit / Chief Complaint: Follow-up (Here today for follow up on labs. Reports he did start slow Fe as ordered. Denies any other complaints. ), Health Maintenance (Patient reports he will have a LDCT done, but refused the colonoscopy. He states he is on blood thinners and his cardiologist doesn't want him to stop them for the length of time needed for colonscopy to be done. ), and Hypertension (Blood pressure is elevated today. Reports taking his blood pressure meds about 10am today. )       Update PCP  Update Chief Complaint         History of Present Illness / Problem Focused Workflow     Theodore Begum presents to the clinic with Follow-up (Here today for follow up on labs. Reports he did start slow Fe as ordered. Denies any other complaints. ), Health Maintenance (Patient reports he will have a LDCT done, but refused the colonoscopy. He states he is on blood thinners and his cardiologist doesn't want him to stop them for the length of time needed for colonscopy to be done. ), and Hypertension (Blood pressure is elevated today. Reports taking his blood pressure meds about 10am today. )     Blood pressure is high today, usually his blood pressure is good when he is in clinic. He does check it outside of clinic some.     Colonoscopy - he is on blood thinners, needed to wait a certain period of time before having it done, his heart doctor will need to say it is ok for him to come off Eliquis     Constipation - has been going on for a long time, prior to starting iron replacement, continues after starting iron, also the color is  quite dark since starting iron         HPI    Review of Systems     Review of Systems   Constitutional:  Negative for activity change, appetite change, chills, fatigue and fever.   HENT:  Negative for nasal congestion, ear pain, hearing loss, postnasal drip and sore throat.    Respiratory:  Negative for cough, chest tightness, shortness of breath and wheezing.    Cardiovascular:  Negative for chest pain, palpitations, leg swelling and claudication.   Gastrointestinal:  Negative for abdominal pain, change in bowel habit, constipation, diarrhea, nausea and vomiting.   Genitourinary:  Negative for dysuria.   Musculoskeletal:  Negative for arthralgias, back pain, gait problem and myalgias.   Integumentary:  Negative for rash.   Neurological:  Negative for weakness and headaches.   Psychiatric/Behavioral:  Negative for suicidal ideas. The patient is not nervous/anxious.         Medical / Social / Family History     Past Medical History:   Diagnosis Date    A-fib     Anemia     Hyperlipidemia     Hypertension        Past Surgical History:   Procedure Laterality Date    ABDOMINAL AORTA STENT      CARDIOVERSION      CHOLECYSTECTOMY      HERNIA REPAIR      PROSTATE SURGERY      REPLACEMENT OF STENT         Social History    reports that he has been smoking cigarettes. He started smoking about 53 years ago. He has a 53.3 pack-year smoking history. He has been exposed to tobacco smoke. He has never used smokeless tobacco. He reports that he does not drink alcohol and does not use drugs.    Family History  's family history includes No Known Problems in his father and mother.    Medications and Allergies     Medications  Outpatient Medications Marked as Taking for the 1/29/24 encounter (Office Visit) with Joseph Alex MD   Medication Sig Dispense Refill    albuterol (PROVENTIL/VENTOLIN HFA) 90 mcg/actuation inhaler INHALE 2 PUFFS BY MOUTH INTO THE LUNGS EVERY 6 HOURS AS NEEDED FOR WHEEZING OR RESCUE 18 g 5     "amiodarone (PACERONE) 200 MG Tab Take 1 tablet by mouth once daily.      apixaban (ELIQUIS) 5 mg Tab Take 1 tablet by mouth 2 (two) times daily.      aspirin (ECOTRIN) 81 MG EC tablet Take 81 mg by mouth once daily.      FARXIGA 10 mg tablet Take 10 mg by mouth once daily.      furosemide (LASIX) 40 MG tablet Take 40 mg by mouth every morning.      losartan (COZAAR) 100 MG tablet TAKE 1 TABLET(100 MG) BY MOUTH EVERY DAY FOR BLOOD PRESSURE 90 tablet 1    metoprolol succinate (TOPROL-XL) 25 MG 24 hr tablet Take 25 mg by mouth once daily.      omeprazole (PRILOSEC) 20 MG capsule Take 1 capsule by mouth once daily.      oxybutynin (DITROPAN) 5 MG Tab Take 5 mg by mouth every evening.      spironolactone (ALDACTONE) 25 MG tablet Take 25 mg by mouth once daily.      [DISCONTINUED] ferrous sulfate, dried (SLOW FE) 160 mg (50 mg iron) TbSR Take 1 tablet (160 mg total) by mouth 2 (two) times a day. Take one tablet by mouth twice daily for anemia. Monitor for constipation. 180 tablet 1    [DISCONTINUED] rosuvastatin (CRESTOR) 20 MG tablet Take 1 tablet (20 mg total) by mouth once daily. For CHOLESTEROL 90 tablet 1       Allergies  Review of patient's allergies indicates:   Allergen Reactions    Moxifloxacin Shortness Of Breath    Cefaclor Other (See Comments)     Abdominal pain       Physical Examination     Vitals:    01/29/24 1314   BP: (!) 197/83   Pulse:    Resp:    Temp:      Vitals:    01/29/24 1235 01/29/24 1250 01/29/24 1314   BP: (!) 211/90 (!) 206/81 (!) 197/83   Pulse: 60     Resp: 20     Temp: 98.6 °F (37 °C)     TempSrc: Oral     Weight: 89.8 kg (198 lb)     Height: 6' 1" (1.854 m)        Physical Exam  Vitals and nursing note reviewed.   Constitutional:       General: He is not in acute distress.     Appearance: Normal appearance. He is not ill-appearing.   Eyes:      Extraocular Movements: Extraocular movements intact.      Pupils: Pupils are equal, round, and reactive to light.   Cardiovascular:      Rate and " Rhythm: Normal rate and regular rhythm.      Heart sounds: Normal heart sounds.   Pulmonary:      Effort: Pulmonary effort is normal.      Breath sounds: Normal breath sounds.   Abdominal:      General: Bowel sounds are normal.      Palpations: Abdomen is soft.   Musculoskeletal:         General: Normal range of motion.   Skin:     Findings: No rash.   Neurological:      General: No focal deficit present.      Mental Status: He is alert and oriented to person, place, and time. Mental status is at baseline.   Psychiatric:         Mood and Affect: Mood normal.         Behavior: Behavior normal.            Assessment and Plan (including Health Maintenance)      Problem List  Smart Sets  Document Outside HM   :    Plan:     HTN - he will monitor his blood pressure outside of clinic and get me the numbers in a couple of weeks, he does report that Cardiology has decreased 2 of his heart medications over the past few months     From a lab perspective everything looked good except iron deficiency anemia. He has been taking iron for several months and tolerating well     Best Practice: I have reviewed the Best practice Diagnoses. The conditions are stable and no changes made to his treatment regimen.     Health Maintenance Due   Topic Date Due    Hepatitis C Screening  Never done    Foot Exam  Never done    TETANUS VACCINE  Never done    Shingles Vaccine (1 of 2) Never done    LDCT Lung Screen  Never done    RSV Vaccine (Age 60+ and Pregnant patients) (1 - 1-dose 60+ series) Never done    Colorectal Cancer Screening  10/28/2019    COVID-19 Vaccine (5 - 2023-24 season) 09/01/2023    Hemoglobin A1c  11/05/2023       Problem List Items Addressed This Visit          Pulmonary    Simple chronic bronchitis (Chronic)       Cardiac/Vascular    Mixed hyperlipidemia (Chronic)    Relevant Medications    rosuvastatin (CRESTOR) 20 MG tablet    Essential hypertension - Primary (Chronic)    Overview      - Goal blood pressure for most  patients is less than 130/85. Both numbers are important. If you have questions about your specific goal blood pressure, please ask at your clinic visits.   - Check blood pressure outside of clinic, record the numbers, and bring the log to all your office visits.   - If you have any chest pain, SOB, or other concerning symptoms, report these immediately, or go to the nearest ER.    - Recommend cardiovascular exercise, at least 3 times per week, for at least 15 minutes.   - Eat a heart healthy diet.            Atrial fibrillation with RVR (Chronic)    Abdominal aortic aneurysm (AAA) without rupture, unspecified part (Chronic)    Chronic heart failure with preserved ejection fraction (HFpEF) (Chronic)    Acute on chronic combined systolic and diastolic heart failure (Chronic)       Renal/    Stage 3a chronic kidney disease (Chronic)       Hematology    Thrombocytopenia, unspecified       Oncology    Malignant neoplasm of prostate (Chronic)       Endocrine    Prediabetes (Chronic)       Orthopedic    RESOLVED: Pressure injury of toe of right foot, stage 2     Other Visit Diagnoses       Microcytic anemia  (Chronic)       Relevant Medications    ferrous sulfate, dried (SLOW FE) 160 mg (50 mg iron) TbSR    Other iron deficiency anemia  (Chronic)       Relevant Medications    ferrous sulfate, dried (SLOW FE) 160 mg (50 mg iron) TbSR            Health Maintenance Topics with due status: Not Due       Topic Last Completion Date    Eye Exam 08/10/2023    PROSTATE-SPECIFIC ANTIGEN 12/08/2023    Diabetes Urine Screening 12/08/2023    Lipid Panel 12/08/2023    High Dose Statin 12/11/2023       Procedures     Future Appointments   Date Time Provider Department Center   2/7/2024  4:00 PM Vicky Colunga MD [facility]   10/4/2024  2:00 PM AW[facility] NURSE, [facility] [place]        Follow up in about 3 months (around 4/29/2024) for hypertension, chronic health problems.     Signature:   Joseph Alex MD  [facility], [address] [place] [address]  Phone #: [phone]  Fax #: [phone]    Date of encounter: 1/29/24    There are no Patient Instructions on file for this visit.

## 2024-03-04 ENCOUNTER — OFFICE VISIT (OUTPATIENT)
Dept: DERMATOLOGY | Facility: CLINIC | Age: 74
End: 2024-03-04
Payer: MEDICARE

## 2024-03-04 VITALS — BODY MASS INDEX: 26.24 KG/M2 | RESPIRATION RATE: 18 BRPM | WEIGHT: 198 LBS | HEIGHT: 73 IN

## 2024-03-04 DIAGNOSIS — L57.0 ACTINIC KERATOSES: ICD-10-CM

## 2024-03-04 DIAGNOSIS — L57.8 OTHER SKIN CHANGES DUE TO CHRONIC EXPOSURE TO NONIONIZING RADIATION: Primary | ICD-10-CM

## 2024-03-04 DIAGNOSIS — Z85.828 HISTORY OF NONMELANOMA SKIN CANCER: ICD-10-CM

## 2024-03-04 DIAGNOSIS — L82.1 SEBORRHEIC KERATOSES: ICD-10-CM

## 2024-03-04 DIAGNOSIS — L21.9 SEBORRHEIC DERMATITIS: ICD-10-CM

## 2024-03-04 PROCEDURE — 17003 DESTRUCT PREMALG LES 2-14: CPT | Mod: ,,, | Performed by: DERMATOLOGY

## 2024-03-04 PROCEDURE — 99213 OFFICE O/P EST LOW 20 MIN: CPT | Mod: 25,,, | Performed by: DERMATOLOGY

## 2024-03-04 PROCEDURE — 17000 DESTRUCT PREMALG LESION: CPT | Mod: ,,, | Performed by: DERMATOLOGY

## 2024-03-04 NOTE — PROGRESS NOTES
[facility]   Vicky Colunga MD    Patient Name: Theodore Begum  Patient YOB: 1950   Date of Service: 3/4/24    CC: Full Skin Exam    HPI: Theodore Begum is a 73 y.o. male presents today for a full skin exam.  Patient was last seen 8/7/2023 and dermatologic history includes SCC and AK's. Patient is concerned today about a lesion located on the left ear.  It has been present for 3 month(s). It has not been treated in the past.  Patient is also concerned about lesion located on the right forearm.    Past Medical History:   Diagnosis Date    A-fib     Anemia     Hyperlipidemia     Hypertension      Past Surgical History:   Procedure Laterality Date    ABDOMINAL AORTA STENT      CARDIOVERSION      CHOLECYSTECTOMY      HERNIA REPAIR      PROSTATE SURGERY      REPLACEMENT OF STENT       Review of patient's allergies indicates:   Allergen Reactions    Moxifloxacin Shortness Of Breath    Cefaclor Other (See Comments)     Abdominal pain       Current Outpatient Medications:     albuterol (PROVENTIL/VENTOLIN HFA) 90 mcg/actuation inhaler, INHALE 2 PUFFS BY MOUTH INTO THE LUNGS EVERY 6 HOURS AS NEEDED FOR WHEEZING OR RESCUE, Disp: 18 g, Rfl: 5    amiodarone (PACERONE) 200 MG Tab, Take 1 tablet by mouth once daily., Disp: , Rfl:     apixaban (ELIQUIS) 5 mg Tab, Take 1 tablet by mouth 2 (two) times daily., Disp: , Rfl:     aspirin (ECOTRIN) 81 MG EC tablet, Take 81 mg by mouth once daily., Disp: , Rfl:     clobetasoL (TEMOVATE) 0.05 % external solution, Apply to scalp BID tapering with improvement, Disp: 50 mL, Rfl: 11    FARXIGA 10 mg tablet, Take 10 mg by mouth once daily., Disp: , Rfl:     ferrous sulfate, dried (SLOW FE) 160 mg (50 mg iron) TbSR, Take 1 tablet (160 mg total) by mouth 2 (two) times a day. Monitor for CONSTIPATION, Disp: 180 tablet, Rfl: 1    furosemide (LASIX) 40 MG tablet, Take 40 mg by mouth every morning., Disp: , Rfl:     ketoconazole (NIZORAL) 2 % shampoo, Use as a scalp treatment 2-3  times a week for maintenance, massaging into scalp and leaving on for up to 3 minutes before rinsing, Disp: 120 mL, Rfl: 11    losartan (COZAAR) 100 MG tablet, TAKE 1 TABLET(100 MG) BY MOUTH EVERY DAY FOR BLOOD PRESSURE, Disp: 90 tablet, Rfl: 1    metoprolol succinate (TOPROL-XL) 25 MG 24 hr tablet, Take 25 mg by mouth once daily., Disp: , Rfl:     omeprazole (PRILOSEC) 20 MG capsule, Take 1 capsule by mouth once daily., Disp: , Rfl:     oxybutynin (DITROPAN) 5 MG Tab, Take 5 mg by mouth every evening., Disp: , Rfl:     rosuvastatin (CRESTOR) 20 MG tablet, Take 1 tablet (20 mg total) by mouth once daily. For CHOLESTEROL, Disp: 90 tablet, Rfl: 1    spironolactone (ALDACTONE) 25 MG tablet, Take 25 mg by mouth once daily., Disp: , Rfl:     ROS: A focused review of systems was obtained and negative.     Exam: A full skin exam was performed including scalp, hair, face, neck, chest, back, abdomen, right arm, left arm, right hand, left hand, nails, right leg, and left leg.  All areas examined were normal expect as per below in assessment and plan.  General Appearance of the patient is well developed and well nourished.  Orientation: alert and oriented x 3.  Mood and affect: pleasant.    Assessment:   The primary encounter diagnosis was Other skin changes due to chronic exposure to nonionizing radiation. Diagnoses of Seborrheic keratoses, Actinic keratoses, History of nonmelanoma skin cancer, and Seborrheic dermatitis were also pertinent to this visit.    Plan:   Medications Ordered This Encounter   Medications    clobetasoL (TEMOVATE) 0.05 % external solution     Sig: Apply to scalp BID tapering with improvement     Dispense:  50 mL     Refill:  11    ketoconazole (NIZORAL) 2 % shampoo     Sig: Use as a scalp treatment 2-3 times a week for maintenance, massaging into scalp and leaving on for up to 3 minutes before rinsing     Dispense:  120 mL     Refill:  11     Seborrheic Keratosis (L82.1)  - Stuck-on, warty, greasy  brown papule with pseudo-horn cysts scattered on the trunk and extremities    Plan: Counseling.  I counseled the patient regarding the following:  Skin Care: Seborrheic Keratoses are benign. No treatment is necessary.  Expectations: Seborrheic Keratoses are benign warty growths. Patients get more of them as they age    Plan: Reassurance    Actinic Keratoses(L57.0)  - Erythematous patches and papules with hyperkeratotic scale distributed on the left hand, left forearm, left ear, .    Plan: Counseling.  I counseled the patient regarding the following:  Skin Care: Sun protective clothing and broad spectrum sunscreen can prevent the formation of Actinic  Keratoses. AKs can resolve with cryotherapy, photodynamic therapy, imiquimod, topical 5-FU.  Expectations: Actinic Keratoses are precancerous proliferations that occur within sun damaged skin. If untreated,  a small subset of AKs can develop into Squamous Cell Carcinoma.  Contact Office if: If AKs fail to resolve despite treatment, or if you develop a side effect from therapy, such as  unbearable crusting, scabbing, redness and tenderness.    Plan: Liquid Nitrogen.  A total of 8 lesions were treated with liquid nitrogen for 2 freeze-thaw cycles lasting 5 seconds, located on the above locations.   The patient's consent was obtained including but not limited to risks of crusting, scabbing,  blistering, scarring, darker or lighter pigmentary change, recurrence, incomplete removal and infection.    Seborrheic Dermatitis  - Pink/orange scaly plaques located on the scalp, nasolabial folds, and eyebrows    Status: Stable     Plan: Counseling.  I counseled the patient regarding the following:  Skin care: Emollients, shampoos with tar, selenium or zinc pyrithione can improve seborrheic dermatitis.  Expectations: Seborrheic Dermatitis is chronic in nature with periods of remissions and flares. Flares can be  triggered by stress.  Contact office if: Seborrheic dermatitis worsens,  or fails to improve despite several months of treatment.    Plan: Prescription   - Will refill clobetasol solution and ketoconazole shampoo    History of non-melanoma skin cancer (Z85.828)  - Well healed scar with NER  Associated diagnosis: Medical surveillance following completed treatment    Plan: Monitoring.    Other Skin Changes Due to Chronic Exposure of Nonionizing Radiation (L57.8)    Plan: Monitoring.     Plan: Sunscreen Recommendations.  I recommended a broad spectrum sunscreen with a SPF of 30 or higher. I explained that SPF 30 sunscreens block approximately 97 percent of the  sun's harmful rays. Sunscreens should be applied at least 15 minutes prior to expected sun exposure and then every 2 hours after that as long as  sun exposure continues. If swimming or exercising sunscreen should be reapplied every 45 minutes to an hour after getting wet or sweating. One  ounce, or the equivalent of a shot glass full of sunscreen, is adequate to protect the skin not covered by a bathing suit. I also recommended a lip  balm with a sunscreen as well. Sun protective clothing can be used in lieu of sunscreen but must be worn the entire time you are exposed to the  sun's rays.      Follow up in about 6 months (around 9/4/2024) for FSE .    Vicky Colunga MD

## 2024-03-05 RX ORDER — CLOBETASOL PROPIONATE 0.46 MG/ML
SOLUTION TOPICAL
Qty: 50 ML | Refills: 11 | Status: SHIPPED | OUTPATIENT
Start: 2024-03-05

## 2024-03-05 RX ORDER — KETOCONAZOLE 20 MG/ML
SHAMPOO, SUSPENSION TOPICAL
Qty: 120 ML | Refills: 11 | Status: SHIPPED | OUTPATIENT
Start: 2024-03-05

## 2024-03-25 ENCOUNTER — OFFICE VISIT (OUTPATIENT)
Dept: FAMILY MEDICINE | Facility: CLINIC | Age: 74
End: 2024-03-25
Payer: MEDICARE

## 2024-03-25 VITALS
DIASTOLIC BLOOD PRESSURE: 80 MMHG | OXYGEN SATURATION: 98 % | HEART RATE: 53 BPM | SYSTOLIC BLOOD PRESSURE: 187 MMHG | RESPIRATION RATE: 16 BRPM | HEIGHT: 73 IN | WEIGHT: 194 LBS | TEMPERATURE: 99 F | BODY MASS INDEX: 25.71 KG/M2

## 2024-03-25 DIAGNOSIS — K64.9 HEMORRHOIDS, UNSPECIFIED HEMORRHOID TYPE: Primary | Chronic | ICD-10-CM

## 2024-03-25 PROBLEM — D69.6 THROMBOCYTOPENIA, UNSPECIFIED: Chronic | Status: ACTIVE | Noted: 2023-12-11

## 2024-03-25 PROCEDURE — 99213 OFFICE O/P EST LOW 20 MIN: CPT | Mod: ,,, | Performed by: FAMILY MEDICINE

## 2024-03-25 RX ORDER — HYDROCORTISONE 25 MG/G
CREAM TOPICAL
Qty: 28 G | Refills: 1 | Status: SHIPPED | OUTPATIENT
Start: 2024-03-25

## 2024-03-25 RX ORDER — FERROUS SULFATE TAB 325 MG (65 MG ELEMENTAL FE) 325 (65 FE) MG
325 TAB ORAL 2 TIMES DAILY
COMMUNITY
Start: 2024-01-29

## 2024-03-25 NOTE — PROGRESS NOTES
Joseph Alex MD    [address] [place] [address]     PATIENT NAME: Theodore Begum  : 1950  DATE: 3/25/24  MRN: [id number]      Billing Provider: Joseph Alex MD  Level of Service: AK OFFICE/OUTPT VISIT, EST, LEVL III, 20-29 MIN  Patient PCP Information       Provider PCP Type    Joseph Alex MD General            Reason for Visit / Chief Complaint: Rectal Pain (Patient complains of pain and swelling to his rectum area that started Friday. Patient tried OTC cream with some relief. Denies bleeding. Patient states he has trouble with constipation. ) and Health Maintenance (Patient would like to discuss colonoscopy with Dr. Alex. )       Update PCP  Update Chief Complaint         History of Present Illness / Problem Focused Workflow     Theodore Begum presents to the clinic with Rectal Pain (Patient complains of pain and swelling to his rectum area that started Friday. Patient tried OTC cream with some relief. Denies bleeding. Patient states he has trouble with constipation. ) and Health Maintenance (Patient would like to discuss colonoscopy with Dr. Alex. )         HPI    Review of Systems     Review of Systems   Constitutional:  Negative for activity change, appetite change, chills, fatigue and fever.   HENT:  Negative for nasal congestion, ear pain, hearing loss, postnasal drip and sore throat.    Respiratory:  Negative for cough, chest tightness, shortness of breath and wheezing.    Cardiovascular:  Negative for chest pain, palpitations, leg swelling and claudication.   Gastrointestinal:  Positive for rectal pain. Negative for abdominal pain, change in bowel habit, constipation, diarrhea, nausea and vomiting.   Genitourinary:  Negative for dysuria.   Musculoskeletal:  Negative for arthralgias, back pain, gait problem and myalgias.   Integumentary:  Negative for rash.   Neurological:  Negative for weakness and headaches.    Psychiatric/Behavioral:  Negative for suicidal ideas. The patient is not nervous/anxious.         Medical / Social / Family History     Past Medical History:   Diagnosis Date    A-fib     Anemia     Hyperlipidemia     Hypertension        Past Surgical History:   Procedure Laterality Date    ABDOMINAL AORTA STENT      CARDIOVERSION      CHOLECYSTECTOMY      HERNIA REPAIR      PROSTATE SURGERY      REPLACEMENT OF STENT         Social History    reports that he has been smoking cigarettes. He started smoking about 53 years ago. He has a 53.5 pack-year smoking history. He has been exposed to tobacco smoke. He has never used smokeless tobacco. He reports that he does not drink alcohol and does not use drugs.    Family History  's family history includes No Known Problems in his father and mother.    Medications and Allergies     Medications  Outpatient Medications Marked as Taking for the 3/25/24 encounter (Office Visit) with Joseph Alex MD   Medication Sig Dispense Refill    albuterol (PROVENTIL/VENTOLIN HFA) 90 mcg/actuation inhaler INHALE 2 PUFFS BY MOUTH INTO THE LUNGS EVERY 6 HOURS AS NEEDED FOR WHEEZING OR RESCUE 18 g 5    amiodarone (PACERONE) 200 MG Tab Take 0.5 tablets by mouth once daily.      apixaban (ELIQUIS) 5 mg Tab Take 1 tablet by mouth 2 (two) times daily.      aspirin (ECOTRIN) 81 MG EC tablet Take 81 mg by mouth once daily.      clobetasoL (TEMOVATE) 0.05 % external solution Apply to scalp BID tapering with improvement 50 mL 11    FARXIGA 10 mg tablet Take 10 mg by mouth once daily.      FEROSUL 325 mg (65 mg iron) Tab tablet Take 325 mg by mouth 2 (two) times daily.      furosemide (LASIX) 40 MG tablet Take 40 mg by mouth every morning.      ketoconazole (NIZORAL) 2 % shampoo Use as a scalp treatment 2-3 times a week for maintenance, massaging into scalp and leaving on for up to 3 minutes before rinsing 120 mL 11    losartan (COZAAR) 100 MG tablet TAKE 1 TABLET(100 MG) BY MOUTH EVERY  DAY FOR BLOOD PRESSURE 90 tablet 1    metoprolol succinate (TOPROL-XL) 25 MG 24 hr tablet Take 25 mg by mouth once daily.      omeprazole (PRILOSEC) 20 MG capsule Take 1 capsule by mouth once daily.      oxybutynin (DITROPAN) 5 MG Tab Take 5 mg by mouth every evening.      rosuvastatin (CRESTOR) 20 MG tablet Take 1 tablet (20 mg total) by mouth once daily. For CHOLESTEROL 90 tablet 1    spironolactone (ALDACTONE) 25 MG tablet Take 25 mg by mouth once daily.      [DISCONTINUED] ferrous sulfate, dried (SLOW FE) 160 mg (50 mg iron) TbSR Take 1 tablet (160 mg total) by mouth 2 (two) times a day. Monitor for CONSTIPATION 180 tablet 1       Allergies  Review of patient's allergies indicates:   Allergen Reactions    Moxifloxacin Shortness Of Breath    Cefaclor Other (See Comments)     Abdominal pain       Physical Examination     Vitals:    03/25/24 1446   BP: (!) 187/80   Pulse: (!) 53   Resp: 16   Temp: 99.2 °F (37.3 °C)     Physical Exam  Vitals and nursing note reviewed.   Constitutional:       General: He is not in acute distress.     Appearance: Normal appearance. He is not ill-appearing.   Eyes:      Extraocular Movements: Extraocular movements intact.      Pupils: Pupils are equal, round, and reactive to light.   Cardiovascular:      Rate and Rhythm: Normal rate and regular rhythm.      Heart sounds: Normal heart sounds.   Pulmonary:      Effort: Pulmonary effort is normal.      Breath sounds: Normal breath sounds.   Abdominal:      General: Bowel sounds are normal.      Palpations: Abdomen is soft.   Musculoskeletal:         General: Normal range of motion.   Skin:     Findings: No rash.   Neurological:      General: No focal deficit present.      Mental Status: He is alert and oriented to person, place, and time. Mental status is at baseline.   Psychiatric:         Mood and Affect: Mood normal.         Behavior: Behavior normal.            Assessment and Plan (including Health Maintenance)      Problem List   Smart Sets  Document Outside HM   :    Plan:         Health Maintenance Due   Topic Date Due    Hepatitis C Screening  Never done    Foot Exam  Never done    TETANUS VACCINE  Never done    Shingles Vaccine (1 of 2) Never done    LDCT Lung Screen  Never done    RSV Vaccine (Age 60+ and Pregnant patients) (1 - 1-dose 60+ series) Never done    Colorectal Cancer Screening  10/28/2019    COVID-19 Vaccine (5 - 2023-24 season) 09/01/2023    Hemoglobin A1c  11/05/2023       Problem List Items Addressed This Visit          GI    Hemorrhoids - Primary (Chronic)    Relevant Medications    hydrocortisone 2.5 % cream       Health Maintenance Topics with due status: Not Due       Topic Last Completion Date    Eye Exam 08/10/2023    PROSTATE-SPECIFIC ANTIGEN 12/08/2023    Diabetes Urine Screening 12/08/2023    Lipid Panel 12/08/2023       Procedures     Future Appointments   Date Time Provider Department Center   9/4/2024  3:30 PM Vicky Colunga MD [facility] DERM [place]   10/4/2024  2:00 PM AWV NURSE, [facility] FAMILY MEDICINE [facility] [place]        Follow up if symptoms worsen or fail to improve.     Signature:  Joseph Alex MD  [address] [place] [address]  Phone #: [phone]  Fax #: [phone]    Date of encounter: 3/25/24    Patient Instructions   Patient Education       Quitting Smoking   The Basics   Written by the doctors and editors at UpToDate   What are the benefits of quitting smoking? -- Quitting smoking can dramatically improve your health and help you live longer. It lowers your risk of heart disease, lung disease, kidney failure, infection, and cancer.   Quitting smoking can also lower your chances of getting osteoporosis, a condition that makes your bones weak. Plus, it can help your skin look younger and reduce the chances that you will have problems with sex.  Quitting is not easy for most people, and it can take several tries to completely  "quit. But help and support are available. Quitting smoking will improve your health no matter how old you are, even if you have smoked for a long time.   What should I do if I want to quit smoking? -- It's a good idea to start by talking with your doctor or nurse. It is possible to quit on your own, without help. But getting help greatly increases your chances of quitting successfully.  When you are ready to quit, you will make a plan to:  Set a quit date  Tell your family and friends that you plan to quit  Plan ahead for the challenges you will face, such as cigarette cravings  Remove cigarettes from your home, car, and work  How can my doctor or nurse help? -- Your doctor or nurse can give you advice on the best way to quit. They can also give you medicines to:  Reduce your craving for cigarettes  Reduce your "withdrawal" symptoms (symptoms that happen when you stop smoking)  Your doctor or nurse can also help you find a counselor to talk to. For most people who are trying to quit smoking, it works best to use both medicines and counseling.  You can also get help from a free phone line (8-156-QUITNOW or 1-554.184.8314) or go online to www.smokefree.gov.  What are the symptoms of withdrawal? -- When you stop smoking, you might have symptoms such as:  Trouble sleeping  Feeling irritable, anxious, or restless  Getting frustrated or angry  Having trouble thinking clearly  These symptoms can be hard to deal with, which is why it can be so hard to quit. But medicines can help.  Some people who stop smoking become temporarily depressed. Some people need treatment for depression, such as counseling or medicines or both. People with depression might:  No longer enjoy or care about doing the things they used to like to do  Feel sad, down, hopeless, nervous, or cranky most of the day, almost every day  Lose or gain weight  Sleep too much or too little  Feel tired or like they have no energy  Feel guilty or like they are " worth nothing  Forget things or feel confused  Move and speak more slowly than usual  Act restless or have trouble staying still  Think about death or suicide  If you think you might be depressed, tell your doctor or nurse right away. They can talk to you about your symptoms and recommend treatment if needed.  If you ever feel like you might hurt yourself, go straight to the nearest emergency department or call for an ambulance (in the [place] and [place], dial 9-1-1). You can also call your doctor or nurse and tell them it is an emergency, or reach the  National Suicide Prevention Lifeline at 1-301.457.2464 or www.suicidepreventionlifeline.org.  How does counseling work? -- A counselor can help you figure out:  What triggers you to want to smoke, and how to handle these situations  How to resist cravings  What you can do differently if you have tried to quit before  You can meet with a counselor in one-on-one sessions or as part of a group. There are other ways to get counseling, too, such as over the phone, through text messaging, or online.   How do medicines help you stop smoking? -- Different medicines work in different ways:  Nicotine replacement therapy - Nicotine is the main drug in cigarettes, and the reason they are addictive. These medicines reduce your body's craving for nicotine. They also help with withdrawal symptoms.  There are different forms of nicotine replacement, including skin patches, lozenges, gum, nasal sprays, and inhalers. Most can be bought without a prescription. Also, health insurance might cover some or all of the cost.  It often helps to use 2 forms of nicotine replacement. For example, you might wear a patch all the time, plus use gum or lozenges when you get a craving to smoke.  Varenicline - Varenicline (brand names: Chantix, Champix) is a prescription medicine that reduces withdrawal symptoms and cigarette cravings. Varenicline can increase the effects of alcohol in some people. It's  "a good idea to limit drinking while you're taking it, at least until you know how it affects you.  Even if you are not yet ready to commit to a quit date, varenicline can help reduce cravings. This can make it easier to quit when you are ready.  Bupropion - Bupropion (sample brand names: Wellbutrin, Zyban) is a prescription medicine that reduces your desire to smoke. It is also available in a generic version, which is cheaper than the brand name medicines. Doctors do not usually prescribe bupropion for people with seizures or who have had seizures in the past.  It might also be helpful to combine nicotine replacement with bupropion or varenicline. In some cases, a person might even take both bupropion and varenicline. Your doctor or nurse can help you figure out the best combination for you.  What about e-cigarettes? -- Sometimes people wonder if using electronic cigarettes, or "e-cigarettes," can help them quit smoking. Using e-cigarettes is also called "vaping." Doctors do not recommend e-cigarettes in place of medicines and counseling. That's because e-cigarettes still contain nicotine as well as other substances that might be harmful. It's also not clear how they can affect a person's health in the long term.  What if I am pregnant and I smoke? -- If you are pregnant, it's really important for the health of your baby that you quit. Ask your doctor what options you have, and what is safest for your baby.  Will I gain weight if I quit? -- You might gain a few pounds. This can be frustrating for some people, but it's important to remember that you are improving your health by quitting smoking. You can help prevent gaining a lot of weight by staying active and eating a healthy diet.  What if I am not able to quit? -- If you don't quit on your first try, or if you quit but then start smoking again, don't give up hope. Lots of people have to try more than once before they are able to completely quit.  It might help to " try to understand why quitting did not work. There might be something you can do differently when you try again. It can help to figure out what situations make you want to smoke, so you can avoid them.   What else can I do to improve my chances of quitting? -- You can:  Get regular exercise. Any type of physical activity, even gentle forms of movement, is good for your health. Physical activity can also help reduce stress.  Stay away from smokers and places that make you want to smoke. If people close to you smoke, ask them to quit with you or avoid smoking around you.  Carry gum, hard candy, or something to put in your mouth. If you get a craving for a cigarette, try one of these instead.  Don't give up, even if you start smoking again. It takes most people a few tries before they succeed.  All topics are updated as new evidence becomes available and our peer review process is complete.  This topic retrieved from [facility] on: Sep 21, 2021.  Topic 23629 Version 22.0  Release: 29.4.2 - C29.263  © 2021 UpToDate, Inc. and/or its affiliates. All rights reserved.  Consumer Information Use and Disclaimer   This information is not specific medical advice and does not replace information you receive from your health care provider. This is only a brief summary of general information. It does NOT include all information about conditions, illnesses, injuries, tests, procedures, treatments, therapies, discharge instructions or life-style choices that may apply to you. You must talk with your health care provider for complete information about your health and treatment options. This information should not be used to decide whether or not to accept your health care provider's advice, instructions or recommendations. Only your health care provider has the knowledge and training to provide advice that is right for you. The use of this information is governed by the [employer] User License Agreement, available at  https://www.[employer].com/en/solutions/lexicomp/about/verenice.The use of [employer] content is governed by the [employer] Terms of Use. ©2021 [employer] All rights reserved.  Copyright   © 2021 UpToDate, Inc. and/or its affiliates. All rights reserved.

## 2024-03-25 NOTE — PATIENT INSTRUCTIONS
"Patient Education       Quitting Smoking   The Basics   Written by the doctors and editors at [facility]   What are the benefits of quitting smoking? -- Quitting smoking can dramatically improve your health and help you live longer. It lowers your risk of heart disease, lung disease, kidney failure, infection, and cancer.   Quitting smoking can also lower your chances of getting osteoporosis, a condition that makes your bones weak. Plus, it can help your skin look younger and reduce the chances that you will have problems with sex.  Quitting is not easy for most people, and it can take several tries to completely quit. But help and support are available. Quitting smoking will improve your health no matter how old you are, even if you have smoked for a long time.   What should I do if I want to quit smoking? -- It's a good idea to start by talking with your doctor or nurse. It is possible to quit on your own, without help. But getting help greatly increases your chances of quitting successfully.  When you are ready to quit, you will make a plan to:  Set a quit date  Tell your family and friends that you plan to quit  Plan ahead for the challenges you will face, such as cigarette cravings  Remove cigarettes from your home, car, and work  How can my doctor or nurse help? -- Your doctor or nurse can give you advice on the best way to quit. They can also give you medicines to:  Reduce your craving for cigarettes  Reduce your "withdrawal" symptoms (symptoms that happen when you stop smoking)  Your doctor or nurse can also help you find a counselor to talk to. For most people who are trying to quit smoking, it works best to use both medicines and counseling.  You can also get help from a free phone line (0-875-QUIT-NOW or 1-472.770.3413) or go online to www.smokefree.gov.  What are the symptoms of withdrawal? -- When you stop smoking, you might have symptoms such as:  Trouble sleeping  Feeling irritable, anxious, or " restless  Getting frustrated or angry  Having trouble thinking clearly  These symptoms can be hard to deal with, which is why it can be so hard to quit. But medicines can help.  Some people who stop smoking become temporarily depressed. Some people need treatment for depression, such as counseling or medicines or both. People with depression might:  No longer enjoy or care about doing the things they used to like to do  Feel sad, down, hopeless, nervous, or cranky most of the day, almost every day  Lose or gain weight  Sleep too much or too little  Feel tired or like they have no energy  Feel guilty or like they are worth nothing  Forget things or feel confused  Move and speak more slowly than usual  Act restless or have trouble staying still  Think about death or suicide  If you think you might be depressed, tell your doctor or nurse right away. They can talk to you about your symptoms and recommend treatment if needed.  If you ever feel like you might hurt yourself, go straight to the nearest emergency department or call for an ambulance (in the [place] and [place], dial 9-1-1). You can also call your doctor or nurse and tell them it is an emergency, or reach the  National Suicide Prevention Lifeline at 1-473.769.2948 or www.suicidepreventionlifeline.org.  How does counseling work? -- A counselor can help you figure out:  What triggers you to want to smoke, and how to handle these situations  How to resist cravings  What you can do differently if you have tried to quit before  You can meet with a counselor in one-on-one sessions or as part of a group. There are other ways to get counseling, too, such as over the phone, through text messaging, or online.   How do medicines help you stop smoking? -- Different medicines work in different ways:  Nicotine replacement therapy - Nicotine is the main drug in cigarettes, and the reason they are addictive. These medicines reduce your body's craving for nicotine. They also help  "with withdrawal symptoms.  There are different forms of nicotine replacement, including skin patches, lozenges, gum, nasal sprays, and inhalers. Most can be bought without a prescription. Also, health insurance might cover some or all of the cost.  It often helps to use 2 forms of nicotine replacement. For example, you might wear a patch all the time, plus use gum or lozenges when you get a craving to smoke.  Varenicline - Varenicline (brand names: Chantix, Champix) is a prescription medicine that reduces withdrawal symptoms and cigarette cravings. Varenicline can increase the effects of alcohol in some people. It's a good idea to limit drinking while you're taking it, at least until you know how it affects you.  Even if you are not yet ready to commit to a quit date, varenicline can help reduce cravings. This can make it easier to quit when you are ready.  Bupropion - Bupropion (sample brand names: Wellbutrin, Zyban) is a prescription medicine that reduces your desire to smoke. It is also available in a generic version, which is cheaper than the brand name medicines. Doctors do not usually prescribe bupropion for people with seizures or who have had seizures in the past.  It might also be helpful to combine nicotine replacement with bupropion or varenicline. In some cases, a person might even take both bupropion and varenicline. Your doctor or nurse can help you figure out the best combination for you.  What about e-cigarettes? -- Sometimes people wonder if using electronic cigarettes, or "e-cigarettes," can help them quit smoking. Using e-cigarettes is also called "vaping." Doctors do not recommend e-cigarettes in place of medicines and counseling. That's because e-cigarettes still contain nicotine as well as other substances that might be harmful. It's also not clear how they can affect a person's health in the long term.  What if I am pregnant and I smoke? -- If you are pregnant, it's really important for the " health of your baby that you quit. Ask your doctor what options you have, and what is safest for your baby.  Will I gain weight if I quit? -- You might gain a few pounds. This can be frustrating for some people, but it's important to remember that you are improving your health by quitting smoking. You can help prevent gaining a lot of weight by staying active and eating a healthy diet.  What if I am not able to quit? -- If you don't quit on your first try, or if you quit but then start smoking again, don't give up hope. Lots of people have to try more than once before they are able to completely quit.  It might help to try to understand why quitting did not work. There might be something you can do differently when you try again. It can help to figure out what situations make you want to smoke, so you can avoid them.   What else can I do to improve my chances of quitting? -- You can:  Get regular exercise. Any type of physical activity, even gentle forms of movement, is good for your health. Physical activity can also help reduce stress.  Stay away from smokers and places that make you want to smoke. If people close to you smoke, ask them to quit with you or avoid smoking around you.  Carry gum, hard candy, or something to put in your mouth. If you get a craving for a cigarette, try one of these instead.  Don't give up, even if you start smoking again. It takes most people a few tries before they succeed.  All topics are updated as new evidence becomes available and our peer review process is complete.  This topic retrieved from "[employer] on: Sep 21, 2021.  Topic 94646 Version 22.0  Release: 29.4.2 - C29.263  © 2021 UpToDate, Inc. and/or its affiliates. All rights reserved.  Consumer Information Use and Disclaimer   This information is not specific medical advice and does not replace information you receive from your health care provider. This is only a brief summary of general information. It does NOT include all  information about conditions, illnesses, injuries, tests, procedures, treatments, therapies, discharge instructions or life-style choices that may apply to you. You must talk with your health care provider for complete information about your health and treatment options. This information should not be used to decide whether or not to accept your health care provider's advice, instructions or recommendations. Only your health care provider has the knowledge and training to provide advice that is right for you. The use of this information is governed by the [employer] End User License Agreement, available at https://www.[employer]/en/solutions/[employer]/about/verenice.The use of [employer] content is governed by the [employer] Terms of Use. ©2021 [employer] All rights reserved.  Copyright   © 2021 UpToDate, Inc. and/or its affiliates. All rights reserved.

## 2024-04-05 ENCOUNTER — TELEPHONE (OUTPATIENT)
Dept: FAMILY MEDICINE | Facility: CLINIC | Age: 74
End: 2024-04-05
Payer: MEDICARE

## 2024-04-05 ENCOUNTER — OFFICE VISIT (OUTPATIENT)
Dept: FAMILY MEDICINE | Facility: CLINIC | Age: 74
End: 2024-04-05
Payer: MEDICARE

## 2024-04-05 VITALS
HEIGHT: 73 IN | BODY MASS INDEX: 25.31 KG/M2 | SYSTOLIC BLOOD PRESSURE: 181 MMHG | DIASTOLIC BLOOD PRESSURE: 79 MMHG | TEMPERATURE: 97 F | WEIGHT: 191 LBS | HEART RATE: 54 BPM | OXYGEN SATURATION: 95 % | RESPIRATION RATE: 18 BRPM

## 2024-04-05 DIAGNOSIS — I45.10 RIGHT BUNDLE BRANCH BLOCK (RBBB) ON ELECTROCARDIOGRAM (ECG): Primary | Chronic | ICD-10-CM

## 2024-04-05 PROCEDURE — 99213 OFFICE O/P EST LOW 20 MIN: CPT | Mod: ,,, | Performed by: FAMILY MEDICINE

## 2024-04-05 RX ORDER — OFLOXACIN 3 MG/ML
1 SOLUTION/ DROPS OPHTHALMIC 2 TIMES DAILY
COMMUNITY
Start: 2024-03-28

## 2024-04-05 RX ORDER — DEXAMETHASONE SODIUM PHOSPHATE 1 MG/ML
1 SOLUTION/ DROPS OPHTHALMIC 4 TIMES DAILY
COMMUNITY
Start: 2024-03-28

## 2024-04-05 NOTE — PROGRESS NOTES
Joseph Alex MD    [address] [place] [address]     PATIENT NAME: Theodore Begum  : 1950  DATE: 24  MRN: [id number]      Billing Provider: Joseph Alex MD  Level of Service: SC OFFICE/OUTPT VISIT, EST, LEVL III, 20-29 MIN  Patient PCP Information       Provider PCP Type    Joseph Alex MD General            Reason for Visit / Chief Complaint: Hospital Follow Up (Patient was seen at [facility] for cataract surgery yesterday and had elevated blood pressure and BBB on his EKG. Stated he sees Dr Austin Sullivan as Cardiologist at [facility] and cannot see him until end of July. Long term smoker for over 50 years. 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 Stated he noticed he was having swelling of ankles. Left foot is a steady burning. )       Update PCP  Update Chief Complaint         History of Present Illness / Problem Focused Workflow     Theodore Begum presents to the clinic with Hospital Follow Up (Patient was seen at [facility] for cataract surgery yesterday and had elevated blood pressure and BBB on his EKG. Stated he sees Dr Austin Sullivan as Cardiologist at [facility] and cannot see him until end of July. Long term smoker for over 50 years. 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 Stated he noticed he was having swelling of ankles. Left foot is a steady burning. )     Reports new findings on EKG, that was appreciated prior to planned cataract surgery at [facility]    Review of Systems     Review of Systems   Constitutional:  Negative for activity change, appetite change, chills, fatigue and fever.   HENT:  Negative for  nasal congestion, ear pain, hearing loss, postnasal drip and sore throat.    Respiratory:  Negative for cough, chest tightness, shortness of breath and wheezing.    Cardiovascular:  Negative for chest pain, palpitations, leg swelling and claudication.   Gastrointestinal:  Negative for abdominal pain, change in bowel habit, constipation, diarrhea, nausea and vomiting.   Genitourinary:  Negative for dysuria.   Musculoskeletal:  Negative for arthralgias, back pain, gait problem and myalgias.   Integumentary:  Negative for rash.   Neurological:  Negative for weakness and headaches.   Psychiatric/Behavioral:  Negative for suicidal ideas. The patient is not nervous/anxious.         Medical / Social / Family History     Past Medical History:   Diagnosis Date    A-fib     Anemia     Hyperlipidemia     Hypertension        Past Surgical History:   Procedure Laterality Date    ABDOMINAL AORTA STENT      CARDIOVERSION      CHOLECYSTECTOMY      HERNIA REPAIR      PROSTATE SURGERY      REPLACEMENT OF STENT         Social History    reports that he has been smoking cigarettes. He started smoking about 53 years ago. He has a 53.5 pack-year smoking history. He has been exposed to tobacco smoke. He has never used smokeless tobacco. He reports that he does not drink alcohol and does not use drugs.    Family History  's family history includes No Known Problems in his father and mother.    Medications and Allergies     Medications  Outpatient Medications Marked as Taking for the 4/5/24 encounter (Office Visit) with Joseph Alex MD   Medication Sig Dispense Refill    albuterol (PROVENTIL/VENTOLIN HFA) 90 mcg/actuation inhaler INHALE 2 PUFFS BY MOUTH INTO THE LUNGS EVERY 6 HOURS AS NEEDED FOR WHEEZING OR RESCUE 18 g 5    apixaban (ELIQUIS) 5 mg Tab Take 1 tablet by mouth 2 (two) times daily.      aspirin (ECOTRIN) 81 MG EC tablet Take 81 mg by mouth once daily.      clobetasoL (TEMOVATE) 0.05 % external solution Apply to  scalp BID tapering with improvement 50 mL 11    dexAMETHasone (DECADRON) 0.1 % ophthalmic solution Place 1 drop into both eyes 4 (four) times daily.      FARXIGA 10 mg tablet Take 10 mg by mouth once daily.      FEROSUL 325 mg (65 mg iron) Tab tablet Take 325 mg by mouth 2 (two) times daily.      furosemide (LASIX) 40 MG tablet Take 40 mg by mouth every morning.      hydrocortisone 2.5 % cream Apply a thin layer of cream to the rectum three times per day for hemorrhoids 28 g 1    losartan (COZAAR) 100 MG tablet TAKE 1 TABLET(100 MG) BY MOUTH EVERY DAY FOR BLOOD PRESSURE 90 tablet 1    metoprolol succinate (TOPROL-XL) 25 MG 24 hr tablet Take 25 mg by mouth once daily.      ofloxacin (OCUFLOX) 0.3 % ophthalmic solution Place 1 drop into both eyes 2 (two) times daily.      omeprazole (PRILOSEC) 20 MG capsule Take 1 capsule by mouth once daily.      oxybutynin (DITROPAN) 5 MG Tab Take 5 mg by mouth every evening.      rosuvastatin (CRESTOR) 20 MG tablet Take 1 tablet (20 mg total) by mouth once daily. For CHOLESTEROL 90 tablet 1    spironolactone (ALDACTONE) 25 MG tablet Take 25 mg by mouth once daily.         Allergies  Review of patient's allergies indicates:   Allergen Reactions    Moxifloxacin Shortness Of Breath    Cefaclor Other (See Comments)     Abdominal pain       Physical Examination     Vitals:    04/05/24 1105   BP: (!) 181/79   Pulse:    Resp:    Temp:      Physical Exam  Vitals and nursing note reviewed.   Constitutional:       General: He is not in acute distress.     Appearance: Normal appearance. He is not ill-appearing.   Eyes:      Extraocular Movements: Extraocular movements intact.      Pupils: Pupils are equal, round, and reactive to light.   Cardiovascular:      Rate and Rhythm: Normal rate and regular rhythm.      Heart sounds: Normal heart sounds.   Pulmonary:      Effort: Pulmonary effort is normal.      Breath sounds: Normal breath sounds.   Abdominal:      General: Bowel sounds are normal.       Palpations: Abdomen is soft.   Musculoskeletal:         General: Normal range of motion.   Skin:     Findings: No rash.   Neurological:      General: No focal deficit present.      Mental Status: He is alert and oriented to person, place, and time. Mental status is at baseline.   Psychiatric:         Mood and Affect: Mood normal.         Behavior: Behavior normal.            Assessment and Plan (including Health Maintenance)      Problem List  Smart Sets  Document Outside HM   :    Plan:     I believe the right BBB, is likely chronic, he has an Electrophysiologist that he sees in a couple of weeks     Health Maintenance Due   Topic Date Due    Hepatitis C Screening  Never done    Foot Exam  Never done    TETANUS VACCINE  Never done    Shingles Vaccine (1 of 2) Never done    LDCT Lung Screen  Never done    RSV Vaccine (Age 60+ and Pregnant patients) (1 - 1-dose 60+ series) Never done    Colorectal Cancer Screening  10/28/2019    COVID-19 Vaccine (5 - 2023-24 season) 09/01/2023    Hemoglobin A1c  11/05/2023       Problem List Items Addressed This Visit          Cardiac/Vascular    Right bundle branch block (RBBB) on electrocardiogram (ECG) - Primary (Chronic)       Health Maintenance Topics with due status: Not Due       Topic Last Completion Date    Eye Exam 08/10/2023    PROSTATE-SPECIFIC ANTIGEN 12/08/2023    Diabetes Urine Screening 12/08/2023    Lipid Panel 12/08/2023       Procedures     Future Appointments   Date Time Provider Department Center   9/4/2024  3:30 PM Vicky Colunga MD [facility] DERM [place]   10/4/2024  2:00 PM AWV NURSE, [facility] FAMILY MEDICINE [facility] [place]        Follow up if symptoms worsen or fail to improve.     Signature:  Joseph Alex MD  [address] [place] [address]  Phone #: [phone]  Fax #: [phone]    Date of encounter: 4/5/24    There are no Patient Instructions on file for this visit.

## 2024-06-17 ENCOUNTER — OFFICE VISIT (OUTPATIENT)
Dept: FAMILY MEDICINE | Facility: CLINIC | Age: 74
End: 2024-06-17
Payer: MEDICARE

## 2024-06-17 VITALS
OXYGEN SATURATION: 96 % | SYSTOLIC BLOOD PRESSURE: 103 MMHG | DIASTOLIC BLOOD PRESSURE: 62 MMHG | HEART RATE: 63 BPM | HEIGHT: 72 IN | BODY MASS INDEX: 26.68 KG/M2 | WEIGHT: 197 LBS

## 2024-06-17 DIAGNOSIS — E11.22 TYPE 2 DIABETES MELLITUS WITH STAGE 3B CHRONIC KIDNEY DISEASE, WITHOUT LONG-TERM CURRENT USE OF INSULIN: Primary | ICD-10-CM

## 2024-06-17 DIAGNOSIS — N18.32 TYPE 2 DIABETES MELLITUS WITH STAGE 3B CHRONIC KIDNEY DISEASE, WITHOUT LONG-TERM CURRENT USE OF INSULIN: Primary | ICD-10-CM

## 2024-06-17 LAB
ALBUMIN SERPL BCP-MCNC: 3 G/DL (ref 3.5–5)
ALBUMIN/GLOB SERPL: 0.8 {RATIO}
ALP SERPL-CCNC: 101 U/L (ref 45–115)
ALT SERPL W P-5'-P-CCNC: 21 U/L (ref 16–61)
ANION GAP SERPL CALCULATED.3IONS-SCNC: 13 MMOL/L (ref 7–16)
AST SERPL W P-5'-P-CCNC: 16 U/L (ref 15–37)
BASOPHILS # BLD AUTO: 0.03 K/UL (ref 0–0.2)
BASOPHILS NFR BLD AUTO: 0.4 % (ref 0–1)
BILIRUB SERPL-MCNC: 0.7 MG/DL (ref ?–1.2)
BUN SERPL-MCNC: 49 MG/DL (ref 7–18)
BUN/CREAT SERPL: 17 (ref 6–20)
CALCIUM SERPL-MCNC: 8.6 MG/DL (ref 8.5–10.1)
CHLORIDE SERPL-SCNC: 100 MMOL/L (ref 98–107)
CO2 SERPL-SCNC: 27 MMOL/L (ref 21–32)
CREAT SERPL-MCNC: 2.88 MG/DL (ref 0.7–1.3)
DIFFERENTIAL METHOD BLD: ABNORMAL
EGFR (NO RACE VARIABLE) (RUSH/TITUS): 22 ML/MIN/1.73M2
EOSINOPHIL # BLD AUTO: 0.03 K/UL (ref 0–0.5)
EOSINOPHIL NFR BLD AUTO: 0.4 % (ref 1–4)
ERYTHROCYTE [DISTWIDTH] IN BLOOD BY AUTOMATED COUNT: 15.1 % (ref 11.5–14.5)
EST. AVERAGE GLUCOSE BLD GHB EST-MCNC: 105 MG/DL
GLOBULIN SER-MCNC: 3.7 G/DL (ref 2–4)
GLUCOSE SERPL-MCNC: 102 MG/DL (ref 74–106)
HBA1C MFR BLD HPLC: 5.3 % (ref 4.5–6.6)
HCT VFR BLD AUTO: 34.6 % (ref 40–54)
HGB BLD-MCNC: 10.8 G/DL (ref 13.5–18)
IMM GRANULOCYTES # BLD AUTO: 0.05 K/UL (ref 0–0.04)
IMM GRANULOCYTES NFR BLD: 0.6 % (ref 0–0.4)
LYMPHOCYTES # BLD AUTO: 1.49 K/UL (ref 1–4.8)
LYMPHOCYTES NFR BLD AUTO: 18.5 % (ref 27–41)
MCH RBC QN AUTO: 29.8 PG (ref 27–31)
MCHC RBC AUTO-ENTMCNC: 31.2 G/DL (ref 32–36)
MCV RBC AUTO: 95.3 FL (ref 80–96)
MONOCYTES # BLD AUTO: 0.78 K/UL (ref 0–0.8)
MONOCYTES NFR BLD AUTO: 9.7 % (ref 2–6)
MPC BLD CALC-MCNC: 10.5 FL (ref 9.4–12.4)
NEUTROPHILS # BLD AUTO: 5.67 K/UL (ref 1.8–7.7)
NEUTROPHILS NFR BLD AUTO: 70.4 % (ref 53–65)
NRBC # BLD AUTO: 0 X10E3/UL
NRBC, AUTO (.00): 0 %
PLATELET # BLD AUTO: 162 K/UL (ref 150–400)
POTASSIUM SERPL-SCNC: 3.7 MMOL/L (ref 3.5–5.1)
PROT SERPL-MCNC: 6.7 G/DL (ref 6.4–8.2)
RBC # BLD AUTO: 3.63 M/UL (ref 4.6–6.2)
SODIUM SERPL-SCNC: 136 MMOL/L (ref 136–145)
WBC # BLD AUTO: 8.05 K/UL (ref 4.5–11)

## 2024-06-17 PROCEDURE — 80053 COMPREHEN METABOLIC PANEL: CPT | Mod: ,,, | Performed by: CLINICAL MEDICAL LABORATORY

## 2024-06-17 PROCEDURE — 85025 COMPLETE CBC W/AUTO DIFF WBC: CPT | Mod: ,,, | Performed by: CLINICAL MEDICAL LABORATORY

## 2024-06-17 PROCEDURE — 99214 OFFICE O/P EST MOD 30 MIN: CPT | Mod: ,,, | Performed by: FAMILY MEDICINE

## 2024-06-17 PROCEDURE — 83036 HEMOGLOBIN GLYCOSYLATED A1C: CPT | Mod: ,,, | Performed by: CLINICAL MEDICAL LABORATORY

## 2024-06-17 RX ORDER — AMLODIPINE BESYLATE 5 MG/1
1 TABLET ORAL DAILY
COMMUNITY
Start: 2024-04-16 | End: 2024-06-17

## 2024-06-17 NOTE — PROGRESS NOTES
Joseph Alex MD    [address] [place] [address]     PATIENT NAME: Theodore Begum  : 1950  DATE: 24  MRN: [id number]      Billing Provider: Joseph Alex MD  Level of Service: MA OFFICE/OUTPT VISIT, EST, LEVL IV, 30-39 MIN  Patient PCP Information       Provider PCP Type    Joseph Alex MD General            Reason for Visit / Chief Complaint: Leg Pain (Pt is here with bilateral calf pain he states has been going on the past two weeks ago. He states they are sore like he has been climbing all day. ), Hypotension (Pt is concerned about how low his BP has been running. He was started on amlodipine and the last week his readings have been low. He stated one reading was 118/50. ), and Health Maintenance (Discussed health maintenance with pt. Denies all care gaps at this time. Stated he has had a teatnus shot and shingles vaccine will check in mixx. He states he was told previously by another Dr not to get a colonscopy. He is curious about the lung screen but not sold on it yet. )       Update PCP  Update Chief Complaint         History of Present Illness / Problem Focused Workflow     Theodore Begum presents to the clinic with Leg Pain (Pt is here with bilateral calf pain he states has been going on the past two weeks ago. He states they are sore like he has been climbing all day. ), Hypotension (Pt is concerned about how low his BP has been running. He was started on amlodipine and the last week his readings have been low. He stated one reading was 118/50. ), and Health Maintenance (Discussed health maintenance with pt. Denies all care gaps at this time. Stated he has had a teatnus shot and shingles vaccine will check in mixx. He states he was told previously by another Dr not to get a colonscopy. He is curious about the lung screen but not sold on it yet. )     Leg pain - in the calves, achy type pain, soreness for 2 weeks with no known cause.  He has a history of myalgia from Statins but had tolerated crestor fairly well     HTN - blood pressure has been dropping pretty low lately     Diabetes - chronically well controlled        HPI    Review of Systems     Review of Systems   Constitutional:  Negative for activity change, appetite change, chills, fatigue and fever.   HENT:  Negative for nasal congestion, ear pain, hearing loss, postnasal drip and sore throat.    Respiratory:  Negative for cough, chest tightness, shortness of breath and wheezing.    Cardiovascular:  Negative for chest pain, palpitations, leg swelling and claudication.   Gastrointestinal:  Negative for abdominal pain, change in bowel habit, constipation, diarrhea, nausea and vomiting.   Genitourinary:  Negative for dysuria.   Musculoskeletal:  Positive for arthralgias, back pain, leg pain and myalgias. Negative for gait problem.   Integumentary:  Negative for rash.   Neurological:  Negative for weakness and headaches.   Psychiatric/Behavioral:  Negative for suicidal ideas. The patient is not nervous/anxious.         Medical / Social / Family History     Past Medical History:   Diagnosis Date    A-fib     Anemia     Hyperlipidemia     Hypertension        Past Surgical History:   Procedure Laterality Date    ABDOMINAL AORTA STENT      CARDIOVERSION      CHOLECYSTECTOMY      HERNIA REPAIR      PROSTATE SURGERY      REPLACEMENT OF STENT         Social History    reports that he has been smoking cigarettes. He started smoking about 53 years ago. He has a 53.7 pack-year smoking history. He has been exposed to tobacco smoke. He has never used smokeless tobacco. He reports that he does not drink alcohol and does not use drugs.    Family History  's family history includes No Known Problems in his father and mother.    Medications and Allergies     Medications  Outpatient Medications Marked as Taking for the 6/17/24 encounter (Office Visit) with Joseph Alex MD   Medication Sig  Dispense Refill    albuterol (PROVENTIL/VENTOLIN HFA) 90 mcg/actuation inhaler INHALE 2 PUFFS BY MOUTH INTO THE LUNGS EVERY 6 HOURS AS NEEDED FOR WHEEZING OR RESCUE 18 g 5    apixaban (ELIQUIS) 5 mg Tab Take 1 tablet by mouth 2 (two) times daily.      aspirin (ECOTRIN) 81 MG EC tablet Take 81 mg by mouth once daily.      clobetasoL (TEMOVATE) 0.05 % external solution Apply to scalp BID tapering with improvement 50 mL 11    dexAMETHasone (DECADRON) 0.1 % ophthalmic solution Place 1 drop into both eyes 4 (four) times daily.      FARXIGA 10 mg tablet Take 10 mg by mouth once daily.      FEROSUL 325 mg (65 mg iron) Tab tablet Take 325 mg by mouth 2 (two) times daily.      furosemide (LASIX) 40 MG tablet Take 40 mg by mouth every morning.      hydrocortisone 2.5 % cream Apply a thin layer of cream to the rectum three times per day for hemorrhoids 28 g 1    ketoconazole (NIZORAL) 2 % shampoo Use as a scalp treatment 2-3 times a week for maintenance, massaging into scalp and leaving on for up to 3 minutes before rinsing 120 mL 11    losartan (COZAAR) 100 MG tablet TAKE 1 TABLET(100 MG) BY MOUTH EVERY DAY FOR BLOOD PRESSURE 90 tablet 1    metoprolol succinate (TOPROL-XL) 25 MG 24 hr tablet Take 25 mg by mouth once daily.      ofloxacin (OCUFLOX) 0.3 % ophthalmic solution Place 1 drop into both eyes 2 (two) times daily.      omeprazole (PRILOSEC) 20 MG capsule Take 1 capsule by mouth once daily.      oxybutynin (DITROPAN) 5 MG Tab Take 5 mg by mouth every evening.      rosuvastatin (CRESTOR) 20 MG tablet Take 1 tablet (20 mg total) by mouth once daily. For CHOLESTEROL 90 tablet 1    spironolactone (ALDACTONE) 25 MG tablet Take 25 mg by mouth once daily.         Allergies  Review of patient's allergies indicates:   Allergen Reactions    Moxifloxacin Shortness Of Breath    Cefaclor Other (See Comments)     Abdominal pain       Physical Examination     Vitals:    06/17/24 1440   BP: 103/62   Pulse: 63     Physical Exam  Vitals  and nursing note reviewed.   Constitutional:       General: He is not in acute distress.     Appearance: Normal appearance. He is not ill-appearing.   Cardiovascular:      Rate and Rhythm: Normal rate and regular rhythm.      Pulses:           Dorsalis pedis pulses are 2+ on the right side and 2+ on the left side.        Posterior tibial pulses are 2+ on the right side and 2+ on the left side.      Heart sounds: Normal heart sounds.   Pulmonary:      Effort: Pulmonary effort is normal.      Breath sounds: Normal breath sounds.   Abdominal:      Palpations: Abdomen is soft.   Musculoskeletal:         General: Normal range of motion.      Right foot: Normal range of motion. No deformity, bunion, Charcot foot, foot drop or prominent metatarsal heads.      Left foot: Normal range of motion. No deformity, bunion, Charcot foot, foot drop or prominent metatarsal heads.   Feet:      Right foot:      Protective Sensation: 10 sites tested.  10 sites sensed.      Skin integrity: Skin integrity normal.      Toenail Condition: Right toenails are normal.      Left foot:      Protective Sensation: 10 sites tested.  10 sites sensed.      Skin integrity: Skin integrity normal. No dry skin.      Toenail Condition: Left toenails are normal.   Skin:     Findings: No rash.   Neurological:      General: No focal deficit present.      Mental Status: He is alert and oriented to person, place, and time. Mental status is at baseline.   Psychiatric:         Mood and Affect: Mood normal.            Assessment and Plan (including Health Maintenance)      Problem List  Smart Sets  Document Outside HM   :    Plan:     Stop crestor, stop amlodipine    Monitor for improvement in muscle aches and low blood pressure         Health Maintenance Due   Topic Date Due    Hepatitis C Screening  Never done    LDCT Lung Screen  Never done    RSV Vaccine (Age 60+ and Pregnant patients) (1 - 1-dose 60+ series) Never done    Colorectal Cancer Screening   10/28/2019    COVID-19 Vaccine (6 - 2023-24 season) 09/01/2023    Hemoglobin A1c  11/05/2023    Eye Exam  08/10/2024       Problem List Items Addressed This Visit          Endocrine    Type 2 diabetes mellitus with stage 3b chronic kidney disease, without long-term current use of insulin - Primary (Chronic)    Current Assessment & Plan      - Check glucose outside of clinic, record numbers, and bring log to follow up visit.    - Take medications as directed, and bring all medications to every clinic appointment.    - Eat a diabetic diet, if there are concerns about what that entails, we have a diabetic educator in our clinic.    - Cardiovascular exercise at least 3 times per week, for at least 15 minutes.    - Patient should be on a Statin medication, unless patient cannot tolerate a Statin.    - Aspirin should be taken everyday,  81mg, unless a higher dose is indicated for other medicaiton conditions. Also do not recommend Aspirin for a patient with known adverse effects from Aspirin.    - Recommend yearly, dilated eye exams for all Diabetic Patients.    - Monitor feet for calluses, abrasion, or other abnormalities. Report any concerns at every clinic visit.    -   Hemoglobin A1C   Date Value Ref Range Status   05/05/2023 5.8 4.5 - 6.6 % Final     Comment:       Normal:               <5.7%  Pre-Diabetic:       5.7% to 6.4%  Diabetic:             >6.4%  Diabetic Goal:     <7%   08/09/2022 6.9 (H) 4.2 - 6.0 % Final     Comment:     For evaluation of glycemic control in known diabetic non-pregnant adults:      A1C <7% lower or higher target A1C values maybe appropriate for individual patients.  For the diagnosis of diabetes mellitus: A1C >/=6.5%  Increased risk for diabetes mellitus:   A1C 5.7-6.4%   08/08/2022 6.5 (H) 4.2 - 6.0 % Final     Comment:     For evaluation of glycemic control in known diabetic non-pregnant adults:      A1C <7% lower or higher target A1C values maybe appropriate for individual  patients.  For the diagnosis of diabetes mellitus: A1C >/=6.5%  Increased risk for diabetes mellitus:   A1C 5.7-6.4%                 Relevant Orders    Hemoglobin A1C    Comprehensive Metabolic Panel    CBC Auto Differential       Health Maintenance Topics with due status: Not Due       Topic Last Completion Date    PROSTATE-SPECIFIC ANTIGEN 12/08/2023    Diabetes Urine Screening 12/08/2023    Lipid Panel 12/08/2023    TETANUS VACCINE 05/08/2024    Shingles Vaccine 05/08/2024    Low Dose Statin 06/17/2024    Foot Exam 06/17/2024       Procedures     Future Appointments   Date Time Provider Department Center   9/4/2024  3:30 PM Vicky Colunga MD [facility]   10/4/2024  2:00 PM AWV NURSE, [facility] [place]        Follow up if symptoms worsen or fail to improve.     Signature:  Joseph Alex MD  [address] [place] [address]  Phone #: [phone]  Fax #: [phone]    Date of encounter: 6/17/24    There are no Patient Instructions on file for this visit.

## 2024-06-17 NOTE — ASSESSMENT & PLAN NOTE
- Check glucose outside of clinic, record numbers, and bring log to follow up visit.    - Take medications as directed, and bring all medications to every clinic appointment.    - Eat a diabetic diet, if there are concerns about what that entails, we have a diabetic educator in our clinic.    - Cardiovascular exercise at least 3 times per week, for at least 15 minutes.    - Patient should be on a Statin medication, unless patient cannot tolerate a Statin.    - Aspirin should be taken everyday,  81mg, unless a higher dose is indicated for other medicaiton conditions. Also do not recommend Aspirin for a patient with known adverse effects from Aspirin.    - Recommend yearly, dilated eye exams for all Diabetic Patients.    - Monitor feet for calluses, abrasion, or other abnormalities. Report any concerns at every clinic visit.    -   Hemoglobin A1C   Date Value Ref Range Status   05/05/2023 5.8 4.5 - 6.6 % Final     Comment:       Normal:               <5.7%  Pre-Diabetic:       5.7% to 6.4%  Diabetic:             >6.4%  Diabetic Goal:     <7%   08/09/2022 6.9 (H) 4.2 - 6.0 % Final     Comment:     For evaluation of glycemic control in known diabetic non-pregnant adults:      A1C <7% lower or higher target A1C values maybe appropriate for individual patients.  For the diagnosis of diabetes mellitus: A1C >/=6.5%  Increased risk for diabetes mellitus:   A1C 5.7-6.4%   08/08/2022 6.5 (H) 4.2 - 6.0 % Final     Comment:     For evaluation of glycemic control in known diabetic non-pregnant adults:      A1C <7% lower or higher target A1C values maybe appropriate for individual patients.  For the diagnosis of diabetes mellitus: A1C >/=6.5%  Increased risk for diabetes mellitus:   A1C 5.7-6.4%

## 2024-06-20 ENCOUNTER — TELEPHONE (OUTPATIENT)
Dept: FAMILY MEDICINE | Facility: CLINIC | Age: 74
End: 2024-06-20
Payer: MEDICARE

## 2024-06-20 DIAGNOSIS — N18.31 STAGE 3A CHRONIC KIDNEY DISEASE: Primary | Chronic | ICD-10-CM

## 2024-06-20 NOTE — TELEPHONE ENCOUNTER
----- Message from Kerry Beltre sent at 6/19/2024 10:09 AM CDT -----  Regarding: urology referral  Pt called and stated that he is ready to see the urologist. He stated that he can't remember who he seen in [place], but he is willing to see someone in [place] if they are good he stated. :)

## 2024-06-25 ENCOUNTER — TELEPHONE (OUTPATIENT)
Dept: FAMILY MEDICINE | Facility: CLINIC | Age: 74
End: 2024-06-25
Payer: MEDICARE

## 2024-06-25 NOTE — TELEPHONE ENCOUNTER
"----- Message from Joseph Alex MD sent at 6/25/2024  3:01 PM CDT -----  Sounds like he is doing great.  ----- Message -----  From: Cha Juan LPN  Sent: 6/25/2024  12:59 PM CDT  To: Joseph Alex MD      ----- Message -----  From: Sendy Stacy  Sent: 6/25/2024  12:45 PM CDT  To: Isai ALVAREZ Staff    Patient called and asked for me to write this down"  Its been like a week since you took me of cholestrol medicine, crestor. Leg and back pain is now gone. Yesterday evening I took my /61 after working all day. Heart rate was 62. " States if the nurse needs to call please call [phone].  "

## 2024-07-01 DIAGNOSIS — I10 ESSENTIAL HYPERTENSION: ICD-10-CM

## 2024-07-01 DIAGNOSIS — L21.9 SEBORRHEIC DERMATITIS: ICD-10-CM

## 2024-07-01 RX ORDER — LOSARTAN POTASSIUM 100 MG/1
100 TABLET ORAL DAILY
Qty: 90 TABLET | Refills: 1 | Status: SHIPPED | OUTPATIENT
Start: 2024-07-01

## 2024-07-01 NOTE — TELEPHONE ENCOUNTER
----- Message from Sendy Stacy sent at 7/1/2024 11:44 AM CDT -----  Patient needs losartan sent in to [facility] in [place], [facility] call back is [phone]

## 2024-07-09 DIAGNOSIS — Z71.89 COMPLEX CARE COORDINATION: ICD-10-CM

## 2024-07-29 ENCOUNTER — TELEPHONE (OUTPATIENT)
Dept: NEPHROLOGY | Facility: CLINIC | Age: 74
End: 2024-07-29
Payer: MEDICARE

## 2024-07-29 NOTE — TELEPHONE ENCOUNTER
----- Message from Dasha Orozco sent at 7/26/2024  2:38 PM CDT -----  Who Called: Sylvie (Dr Kent - cardiologist)    Caller is requesting a sooner appointment. Caller declined first available appointment listed below. Caller will not accept being placed on the waitlist and is requesting a message be sent to doctor.          Preferred Method of Contact: Phone Call  Patient's Preferred Phone Number on File: [phone]   Best Call Back Number, if different: call back # [phone]  Additional Information: trying to get pt in sooner than January for elevated kidney function

## 2024-08-13 ENCOUNTER — OFFICE VISIT (OUTPATIENT)
Dept: NEPHROLOGY | Facility: CLINIC | Age: 74
End: 2024-08-13
Payer: MEDICARE

## 2024-08-13 VITALS
DIASTOLIC BLOOD PRESSURE: 58 MMHG | OXYGEN SATURATION: 96 % | HEART RATE: 56 BPM | WEIGHT: 185 LBS | RESPIRATION RATE: 18 BRPM | BODY MASS INDEX: 25.06 KG/M2 | HEIGHT: 72 IN | SYSTOLIC BLOOD PRESSURE: 112 MMHG

## 2024-08-13 DIAGNOSIS — N18.4 CKD (CHRONIC KIDNEY DISEASE) STAGE 4, GFR 15-29 ML/MIN: Primary | ICD-10-CM

## 2024-08-13 DIAGNOSIS — E11.21 DIABETIC NEPHROPATHY ASSOCIATED WITH TYPE 2 DIABETES MELLITUS: ICD-10-CM

## 2024-08-13 DIAGNOSIS — I12.9 HYPERTENSIVE NEPHROSCLEROSIS, STAGE 1 THROUGH STAGE 4 OR UNSPECIFIED CHRONIC KIDNEY DISEASE: ICD-10-CM

## 2024-08-13 PROCEDURE — 99203 OFFICE O/P NEW LOW 30 MIN: CPT | Mod: S$PBB,,, | Performed by: NURSE PRACTITIONER

## 2024-08-13 PROCEDURE — 99215 OFFICE O/P EST HI 40 MIN: CPT | Mod: PBBFAC | Performed by: NURSE PRACTITIONER

## 2024-08-13 PROCEDURE — 99999 PR PBB SHADOW E&M-EST. PATIENT-LVL V: CPT | Mod: PBBFAC,,, | Performed by: NURSE PRACTITIONER

## 2024-08-13 NOTE — PROGRESS NOTES
[facility] History and Physical  Patient Name: Theodore Begum  MRN: [id number]  Age: 74 y.o.  : 1950    Date: 2024 3:37 PM    Chief Complaint: Establish Care    HPI :   Mr Begum presents to Nephrology clinic to establish care. He is followed by Dr. Alex as his primary care provider. Previously seen by Nephrology at [facility].     Cardiac ablation 2 years ago for A-Fib: Takes ASA 81 mg and Eliquis 5 mg BID    HTN: diagnosed in his 40's. Current regimen includes Lasix 40 mg daily, losartan 100 mg daily, Toprol-XL 25 mg daily, spironolactone 25 mg daily    DM2: diagnosed about 10 years ago. Current regimen includes Farxiga 10 mg daily    Hx of prostate CA 10 years ago.     Nephrology history: No FH of kidney disease, no nephrolithiasis, or recurrent UTIs. No OTC medications including NSAIDs. His two brothers passed from Amyloidosis, per patient he had genetic testing in the past that showed he did not have Amyloidosis gene.     Patient denies any CP, SOB, peripheral edema, dysuria, hematuria, changes in urinary habits, or increased frequency of urination.     Past Medical History:  has a past medical history of A-fib, Anemia, Hyperlipidemia, and Hypertension.     Past Surgical History:   has a past surgical history that includes Hernia repair; Prostate surgery; Cholecystectomy; Replacement of stent; Abdominal aorta stent; and Cardioversion.     Family History:  family history includes No Known Problems in his father and mother. No family history of kidney disease. Brothers  from Amyloidosis     Social History:   reports that he has been smoking cigarettes. He started smoking about 53 years ago. He has a 53.8 pack-year smoking history. He has been exposed to tobacco smoke. He has never used smokeless tobacco. He reports that he does not drink alcohol and does not use drugs.     Allergies: is allergic to moxifloxacin and cefaclor.     Medications: Reviewed  including OTC medications, herbal supplements, and NSAIDS.     Old records have been reviewed.      Review of Systems:  ROS: A 10 point ROS was completed and found to be negative except for that mentioned above.          Physical Exam:  Vitals:    08/13/24 1532   BP: (!) 112/58   Pulse: (!) 56   Resp: 18       Constitutional: sitting in chair, in NAD  Eyes: EOMI, white sclera  ENMT: moist mucus membranes, nares patent  Cardiovascular: normal rate, S1/S2 noted, no edema  Respiratory: symmetrical chest expansion, CTA-B  Gastrointestinal: +BS, soft, NT/ND  Musculoskeletal: normal, no joint erythema/effusions  Skin: no rash, no purpura, warm extremities  Neurological: Alert and Oriented x 3, afocal    Labs:   Lab Results   Component Value Date     06/17/2024    K 3.7 06/17/2024    CREATININE 2.88 (H) 06/17/2024    ALT 21 06/17/2024    AST 16 06/17/2024    HGBA1C 5.3 06/17/2024    LDLCALC 20 12/08/2023    CHOL 85 12/08/2023    HDL 45 12/08/2023    TRIG 98 12/08/2023    WBC 8.05 06/17/2024    HGB 10.8 (L) 06/17/2024    HCT 34.6 (L) 06/17/2024     06/17/2024    PSA <0.010 12/08/2023        Otherwise Reviewed    Assessment/Plan:       CKD stage IV in setting of hypertensive nephrosclerosis, diabetic nephropathy, and nicotine dependence (cigarettes). Baseline sCr TBD, today sCr pending. Counseled to avoid nephrotoxic agents such as NSAIDs. Renal U/S from 2023 reviewed and discussed with patient.     Patient expressed concern today that he was unaware of his CKD diagnosis. I see where he has seen [facility] in the past though.     Proteinuria: urine Prot:Creat ratio is pending. Patient is on RAAS blockade with Losartan and SPLT.     Anemia: CBC pending    BMD: Calcium and Phosphorus PTH, Vit D pending    HTN: well controlled with current meds     DM type : on ARB, SGTL2i    RTC 4 months with CBC, RFP, UA, urine for Prot:creat ratio, PTH, Vit D      Signature:             EMANI Lundy  [facility]  [facility]  [id number] [address]  [phone]        30 minutes of total time spent on the encounter, which includes face to face time and non-face to face time preparing to see the patient (eg, review of tests), Obtaining and/or reviewing separately obtained history, Documenting clinical information in the electronic or other health record, Independently interpreting results (not separately reported) and communicating results to the patient/family/caregiver, or Care coordination (not separately reported).       Date of encounter: 8/13/24

## 2024-08-15 ENCOUNTER — TELEPHONE (OUTPATIENT)
Dept: NEPHROLOGY | Facility: CLINIC | Age: 74
End: 2024-08-15
Payer: MEDICARE

## 2024-08-15 NOTE — TELEPHONE ENCOUNTER
----- Message from EMANI Medina sent at 8/15/2024  2:50 PM CDT -----  Please let Mr. Begum know that his renal function is stable. No changes are needed at this time.     Thank you     EMANI Medina

## 2024-09-04 ENCOUNTER — OFFICE VISIT (OUTPATIENT)
Dept: DERMATOLOGY | Facility: CLINIC | Age: 74
End: 2024-09-04
Payer: MEDICARE

## 2024-09-04 DIAGNOSIS — L57.0 AK (ACTINIC KERATOSIS): ICD-10-CM

## 2024-09-04 DIAGNOSIS — L82.1 SK (SEBORRHEIC KERATOSIS): ICD-10-CM

## 2024-09-04 DIAGNOSIS — Z85.828 HISTORY OF NONMELANOMA SKIN CANCER: ICD-10-CM

## 2024-09-04 DIAGNOSIS — D48.9 NEOPLASM OF UNCERTAIN BEHAVIOR: ICD-10-CM

## 2024-09-04 DIAGNOSIS — L57.8 OTHER SKIN CHANGES DUE TO CHRONIC EXPOSURE TO NONIONIZING RADIATION: Primary | ICD-10-CM

## 2024-09-04 PROCEDURE — 88305 TISSUE EXAM BY PATHOLOGIST: CPT | Mod: 26,,, | Performed by: PATHOLOGY

## 2024-09-04 PROCEDURE — 88305 TISSUE EXAM BY PATHOLOGIST: CPT | Mod: TC,SUR | Performed by: DERMATOLOGY

## 2024-09-04 NOTE — PROGRESS NOTES
[facility]   Vicky Colunga MD    Patient Name: Theodore Begum  Patient YOB: 1950   Date of Service: 9/4/24    CC: Full Skin Exam    HPI: Theodore Begum is a 74 y.o. male presents today for a full skin exam.  Patient was last seen 03/24 and dermatologic history includes NMSC and AKs. Patient is concerned today about a lesion located on the right arm.  It has been present for 5 month(s). It has not been treated in the past.      Past Medical History:   Diagnosis Date    A-fib     Anemia     Hyperlipidemia     Hypertension      Past Surgical History:   Procedure Laterality Date    ABDOMINAL AORTA STENT      CARDIOVERSION      CHOLECYSTECTOMY      HERNIA REPAIR      PROSTATE SURGERY      REPLACEMENT OF STENT       Review of patient's allergies indicates:   Allergen Reactions    Moxifloxacin Shortness Of Breath    Cefaclor Other (See Comments)     Abdominal pain       Current Outpatient Medications:     albuterol (PROVENTIL/VENTOLIN HFA) 90 mcg/actuation inhaler, INHALE 2 PUFFS BY MOUTH INTO THE LUNGS EVERY 6 HOURS AS NEEDED FOR WHEEZING OR RESCUE, Disp: 18 g, Rfl: 5    apixaban (ELIQUIS) 5 mg Tab, Take 1 tablet by mouth 2 (two) times daily., Disp: , Rfl:     aspirin (ECOTRIN) 81 MG EC tablet, Take 81 mg by mouth once daily., Disp: , Rfl:     dexAMETHasone (DECADRON) 0.1 % ophthalmic solution, Place 1 drop into both eyes 4 (four) times daily., Disp: , Rfl:     FARXIGA 10 mg tablet, Take 10 mg by mouth once daily., Disp: , Rfl:     FEROSUL 325 mg (65 mg iron) Tab tablet, Take 325 mg by mouth 2 (two) times daily., Disp: , Rfl:     furosemide (LASIX) 40 MG tablet, Take 40 mg by mouth every morning., Disp: , Rfl:     losartan (COZAAR) 100 MG tablet, Take 1 tablet (100 mg total) by mouth once daily. For BLOOD PRESSURE, Disp: 90 tablet, Rfl: 1    metoprolol succinate (TOPROL-XL) 25 MG 24 hr tablet, Take 25 mg by mouth once daily., Disp: , Rfl:     ofloxacin (OCUFLOX) 0.3 % ophthalmic solution, Place 1 drop  into both eyes 2 (two) times daily., Disp: , Rfl:     omeprazole (PRILOSEC) 20 MG capsule, Take 1 capsule by mouth once daily., Disp: , Rfl:     oxybutynin (DITROPAN) 5 MG Tab, Take 5 mg by mouth every evening., Disp: , Rfl:     spironolactone (ALDACTONE) 25 MG tablet, Take 25 mg by mouth once daily., Disp: , Rfl:     ROS: A focused review of systems was obtained and negative.     Exam: A full skin exam was performed including scalp, hair, face, neck, chest, back, abdomen, right arm, left arm, right hand, left hand, nails, right leg, and left leg.  All areas examined were normal expect as per below in assessment and plan.  General Appearance of the patient is well developed and well nourished.  Orientation: alert and oriented x 3.  Mood and affect: pleasant.    Assessment:   The primary encounter diagnosis was Other skin changes due to chronic exposure to nonionizing radiation. Diagnoses of SK (seborrheic keratosis), AK (actinic keratosis), History of nonmelanoma skin cancer, and Neoplasm of uncertain behavior were also pertinent to this visit.    Plan:        Actinic Keratoses(L57.0)  - Erythematous patches and papules with hyperkeratotic scale distributed on the left arm, left ear, and right arm.    Plan: Counseling.  I counseled the patient regarding the following:  Skin Care: Sun protective clothing and broad spectrum sunscreen can prevent the formation of Actinic  Keratoses. AKs can resolve with cryotherapy, photodynamic therapy, imiquimod, topical 5-FU.  Expectations: Actinic Keratoses are precancerous proliferations that occur within sun damaged skin. If untreated,  a small subset of AKs can develop into Squamous Cell Carcinoma.  Contact Office if: If AKs fail to resolve despite treatment, or if you develop a side effect from therapy, such as  unbearable crusting, scabbing, redness and tenderness.    Plan: Liquid Nitrogen.  A total of 8 lesions were treated with liquid nitrogen for 2 freeze-thaw cycles  lasting 5 seconds, located on the above locations.   The patient's consent was obtained including but not limited to risks of crusting, scabbing,  blistering, scarring, darker or lighter pigmentary change, recurrence, incomplete removal and infection.      Neoplasm of Uncertain Behavior (D48.5)  - Pink keratotic papule located on the left dorsal distal forearm  Ddx includes: AK vs SCC    Plan: Biopsy by Shave Method.  Location (1): Left distal dorsal forearm  Written consent was obtained and risks were reviewed including but not  limited to scarring, infection, bleeding, scabbing, incomplete removal, nerve damage and allergy to anesthesia.  The area was prepped with Chloraprep. Local anesthesia was obtained with approximately 0.5cc of 1% lidocaine  with epinephrine. A biopsy by shave method to the level of the dermis (sent for H and E) was performed using  a Dermablade on the above location. Aluminum chloride was used for hemostasis. Following the biopsy  Petrolatum and a bandage were applied. Patient will be notified of biopsy results. However, patient instructed to  call the office if not contacted within 2 weeks.    - Pink keratotic papule located on the right proximal dorsal forearm   Ddx includes: AK vs SCC    Plan: Biopsy by Shave Method.  Location (2): Right proximal dorsal forearm  Written consent was obtained and risks were reviewed including but not  limited to scarring, infection, bleeding, scabbing, incomplete removal, nerve damage and allergy to anesthesia.  The area was prepped with Chloraprep. Local anesthesia was obtained with approximately 0.5cc of 1% lidocaine  with epinephrine. A biopsy by shave method to the level of the dermis (sent for H and E) was performed using  a Dermablade on the above location. Aluminum chloride was used for hemostasis. Following the biopsy  Petrolatum and a bandage were applied. Patient will be notified of biopsy results. However, patient instructed to  call the office  if not contacted within 2 weeks.    Plan: Counseling.  I counseled the patient regarding the following:  Instructions: Neoplasms of Uncertain Behavior can be observed, biopsied or surgically removed depending on the  level of clinical suspicion.  Instructions: Neoplasms of Uncertain Behavior can be observed, biopsied or surgically removed depending on the  level of clinical suspicion.  Contact Office if: patient develops any new lesions that fail to heal, ulcerate or bleed.      History of non-melanoma skin cancer (Z85.828)  - Well healed scar with NER  Associated diagnosis: Medical surveillance following completed treatment    Plan: Monitoring.      Other Skin Changes Due to Chronic Exposure of Nonionizing Radiation (L57.8)    Plan: Monitoring.     Plan: Sunscreen Recommendations.  I recommended a broad spectrum sunscreen with a SPF of 30 or higher. I explained that SPF 30 sunscreens block approximately 97 percent of the  sun's harmful rays. Sunscreens should be applied at least 15 minutes prior to expected sun exposure and then every 2 hours after that as long as  sun exposure continues. If swimming or exercising sunscreen should be reapplied every 45 minutes to an hour after getting wet or sweating. One  ounce, or the equivalent of a shot glass full of sunscreen, is adequate to protect the skin not covered by a bathing suit. I also recommended a lip  balm with a sunscreen as well. Sun protective clothing can be used in lieu of sunscreen but must be worn the entire time you are exposed to the  sun's rays.    Seborrheic Keratosis (L82.1)  - Stuck-on, warty, greasy brown papule with pseudo-horn cysts scattered on the trunk and extremities    Plan: Counseling.  I counseled the patient regarding the following:  Skin Care: Seborrheic Keratoses are benign. No treatment is necessary.  Expectations: Seborrheic Keratoses are benign warty growths. Patients get more of them as they age    Plan: Reassurance    Follow up in  about 6 months (around 3/4/2025) for FSE.    Vicky Colunga MD

## 2024-10-03 NOTE — PROGRESS NOTES
Theodore Begum presented for a  Medicare AWV and comprehensive Health Risk Assessment today. The following components were reviewed and updated:    Medical history  Family History  Social history  Allergies and Current Medications  Health Risk Assessment  Health Maintenance  Care Team         ** See Completed Assessments for Annual Wellness Visit within the encounter summary.**  Declined Hep C, Colon screening, Influenza on today visit.       The following assessments were completed:  Living Situation  CAGE  Depression Screening  Timed Get Up and Go  Whisper Test  Cognitive Function Screening  Nutrition Screening  ADL Screening  PAQ Screening        Opioid Documentation    does not have a current opioid prescription.       Vitals:    10/04/24 1442   BP: 120/70   Pulse: (!) 46   Resp: 14   Temp: 98.7 °F (37.1 °C)   SpO2: 95%   Weight: 87.1 kg (192 lb)   Height: 6' (1.829 m)     Body mass index is 26.04 kg/m².  Physical Exam  Vitals and nursing note reviewed.   Constitutional:       General: He is not in acute distress.     Appearance: Normal appearance. He is not ill-appearing.   Eyes:      Extraocular Movements: Extraocular movements intact.      Pupils: Pupils are equal, round, and reactive to light.   Cardiovascular:      Rate and Rhythm: Normal rate and regular rhythm.   Pulmonary:      Effort: Pulmonary effort is normal.      Breath sounds: Normal breath sounds.   Skin:     General: Skin is warm.      Findings: No rash.   Neurological:      General: No focal deficit present.      Mental Status: He is alert and oriented to person, place, and time. Mental status is at baseline.   Psychiatric:         Mood and Affect: Mood normal.         Behavior: Behavior normal.               Diagnoses and health risks identified today and associated recommendations/orders:    Problem List Items Addressed This Visit          Cardiac/Vascular    Mixed hyperlipidemia (Chronic)    Relevant Orders    Lipid Panel    Essential  hypertension (Chronic)       Endocrine    Type 2 diabetes mellitus with stage 3b chronic kidney disease, without long-term current use of insulin (Chronic)     Other Visit Diagnoses       Encounter for subsequent annual wellness visit (AWV) in Medicare patient    -  Primary    Type 2 diabetes mellitus with other circulatory complication, without long-term current use of insulin        BMI 26.0-26.9,adult        Encounter for hepatitis C virus screening test for high risk patient        Relevant Orders    Hepatitis C Antibody        Chronic Health problems are stable. No changes made today    Provided Theodore with a 5-10 year written screening schedule and personal prevention plan. Recommendations were developed using the USPSTF age appropriate recommendations. Education, counseling, and referrals were provided as needed. After Visit Summary printed and given to patient which includes a list of additional screenings\tests needed.    Follow up for 1 year for Annual Wellness Visit.    Joseph Alex MD     I offered to discuss advanced care planning, including how to pick a person who would make decisions for you if you were unable to make them for yourself, called a health care power of , and what kind of decisions you might make such as use of life sustaining treatments such as ventilators and tube feeding when faced with a life limiting illness recorded on a living will that they will need to know. (How you want to be cared for as you near the end of your natural life)     X Patient is interested in learning more about how to make advanced directives.  I provided them paperwork and offered to discuss this with them.

## 2024-10-04 ENCOUNTER — OFFICE VISIT (OUTPATIENT)
Dept: FAMILY MEDICINE | Facility: CLINIC | Age: 74
End: 2024-10-04
Payer: MEDICARE

## 2024-10-04 VITALS
HEART RATE: 46 BPM | HEIGHT: 72 IN | BODY MASS INDEX: 26.01 KG/M2 | RESPIRATION RATE: 14 BRPM | DIASTOLIC BLOOD PRESSURE: 70 MMHG | SYSTOLIC BLOOD PRESSURE: 120 MMHG | OXYGEN SATURATION: 95 % | WEIGHT: 192 LBS | TEMPERATURE: 99 F

## 2024-10-04 DIAGNOSIS — Z00.00 ENCOUNTER FOR SUBSEQUENT ANNUAL WELLNESS VISIT (AWV) IN MEDICARE PATIENT: Primary | ICD-10-CM

## 2024-10-04 DIAGNOSIS — E78.2 MIXED HYPERLIPIDEMIA: ICD-10-CM

## 2024-10-04 DIAGNOSIS — I10 ESSENTIAL HYPERTENSION: ICD-10-CM

## 2024-10-04 DIAGNOSIS — Z11.59 ENCOUNTER FOR HEPATITIS C VIRUS SCREENING TEST FOR HIGH RISK PATIENT: ICD-10-CM

## 2024-10-04 DIAGNOSIS — Z91.89 ENCOUNTER FOR HEPATITIS C VIRUS SCREENING TEST FOR HIGH RISK PATIENT: ICD-10-CM

## 2024-10-04 DIAGNOSIS — N18.32 TYPE 2 DIABETES MELLITUS WITH STAGE 3B CHRONIC KIDNEY DISEASE, WITHOUT LONG-TERM CURRENT USE OF INSULIN: ICD-10-CM

## 2024-10-04 DIAGNOSIS — E11.22 TYPE 2 DIABETES MELLITUS WITH STAGE 3B CHRONIC KIDNEY DISEASE, WITHOUT LONG-TERM CURRENT USE OF INSULIN: ICD-10-CM

## 2024-10-04 DIAGNOSIS — E11.59 TYPE 2 DIABETES MELLITUS WITH OTHER CIRCULATORY COMPLICATION, WITHOUT LONG-TERM CURRENT USE OF INSULIN: ICD-10-CM

## 2024-10-04 LAB
CHOLEST SERPL-MCNC: 133 MG/DL (ref 0–200)
CHOLEST/HDLC SERPL: 3.2 {RATIO}
HCV AB SER QL: NORMAL
HDLC SERPL-MCNC: 41 MG/DL (ref 40–60)
LDLC SERPL CALC-MCNC: 67 MG/DL
LDLC/HDLC SERPL: 1.6 {RATIO}
NONHDLC SERPL-MCNC: 92 MG/DL
TRIGL SERPL-MCNC: 126 MG/DL (ref 35–150)
VLDLC SERPL-MCNC: 25 MG/DL

## 2024-10-04 PROCEDURE — 80061 LIPID PANEL: CPT | Mod: ,,, | Performed by: CLINICAL MEDICAL LABORATORY

## 2024-10-04 PROCEDURE — 86803 HEPATITIS C AB TEST: CPT | Mod: ,,, | Performed by: CLINICAL MEDICAL LABORATORY

## 2024-10-04 NOTE — PATIENT INSTRUCTIONS
Counseling and Referral of Other Preventative  (Italic type indicates deductible and co-insurance are waived)    Patient Name: Theodore Begum  Today's Date: 10/4/2024    Health Maintenance       Date Due Completion Date    Hepatitis C Screening Never done ---    Low Dose Statin Never done ---    LDCT Lung Screen Never done ---    RSV Vaccine (Age 60+ and Pregnant patients) (1 - Risk 60-74 years 1-dose series) Never done ---    Colorectal Cancer Screening 10/28/2019 10/28/2016    Shingles Vaccine (2 of 2) 07/03/2024 5/8/2024    Eye Exam 08/10/2024 8/10/2023    COVID-19 Vaccine (6 - 2024-25 season) 09/01/2024 10/13/2022    PROSTATE-SPECIFIC ANTIGEN 12/08/2024 12/8/2023    Lipid Panel 12/08/2024 12/8/2023    Hemoglobin A1c 12/17/2024 6/17/2024    Foot Exam 06/17/2025 6/17/2024    Diabetes Urine Screening 08/13/2025 8/13/2024    TETANUS VACCINE 05/08/2034 5/8/2024        No orders of the defined types were placed in this encounter.      The following information is provided to all patients.  This information is to help you find resources for any of the problems found today that may be affecting your health:                  Living healthy guide: [place].gov    Understanding Diabetes: www.diabetes.org      Eating healthy: www.cdc.gov/healthyweight      CDC home safety checklist: www.cdc.gov/steadi/patient.html      Agency on Aging: [place].gov    Alcoholics anonymous (AA): www.aa.org      Physical Activity: www.gerardo.nih.gov/pw1musm      Tobacco use: [place].gov

## 2024-10-31 ENCOUNTER — OFFICE VISIT (OUTPATIENT)
Dept: FAMILY MEDICINE | Facility: CLINIC | Age: 74
End: 2024-10-31
Payer: MEDICARE

## 2024-10-31 VITALS
HEIGHT: 72 IN | HEART RATE: 55 BPM | BODY MASS INDEX: 26.55 KG/M2 | RESPIRATION RATE: 18 BRPM | SYSTOLIC BLOOD PRESSURE: 162 MMHG | DIASTOLIC BLOOD PRESSURE: 67 MMHG | TEMPERATURE: 98 F | OXYGEN SATURATION: 97 % | WEIGHT: 196 LBS

## 2024-10-31 DIAGNOSIS — L84 FOOT CALLUS: Chronic | ICD-10-CM

## 2024-10-31 DIAGNOSIS — B35.3 TINEA PEDIS OF RIGHT FOOT: Chronic | ICD-10-CM

## 2024-10-31 DIAGNOSIS — B36.9 DERMAL MYCOSIS: Chronic | ICD-10-CM

## 2024-10-31 DIAGNOSIS — E78.2 MIXED HYPERLIPIDEMIA: Primary | Chronic | ICD-10-CM

## 2024-10-31 DIAGNOSIS — I10 ESSENTIAL HYPERTENSION: Chronic | ICD-10-CM

## 2024-10-31 DIAGNOSIS — K64.9 HEMORRHOIDS, UNSPECIFIED HEMORRHOID TYPE: Chronic | ICD-10-CM

## 2024-10-31 DIAGNOSIS — I48.91 ATRIAL FIBRILLATION WITH RVR: Chronic | ICD-10-CM

## 2024-10-31 DIAGNOSIS — N18.31 STAGE 3A CHRONIC KIDNEY DISEASE: Chronic | ICD-10-CM

## 2024-10-31 PROCEDURE — 99214 OFFICE O/P EST MOD 30 MIN: CPT | Mod: ,,, | Performed by: FAMILY MEDICINE

## 2024-10-31 RX ORDER — PREGABALIN 50 MG/1
50 CAPSULE ORAL 3 TIMES DAILY
COMMUNITY
Start: 2024-10-30 | End: 2024-10-31

## 2024-10-31 RX ORDER — AMIODARONE HYDROCHLORIDE 200 MG/1
200 TABLET ORAL DAILY
COMMUNITY
Start: 2024-08-26

## 2024-10-31 RX ORDER — CLOTRIMAZOLE AND BETAMETHASONE DIPROPIONATE 10; .64 MG/G; MG/G
CREAM TOPICAL
Qty: 45 G | Refills: 2 | Status: SHIPPED | OUTPATIENT
Start: 2024-10-31

## 2024-10-31 RX ORDER — HYDROCORTISONE 25 MG/G
CREAM TOPICAL
Qty: 56 G | Refills: 2 | Status: SHIPPED | OUTPATIENT
Start: 2024-10-31

## 2024-10-31 NOTE — PROGRESS NOTES
Joseph Alex MD    [address] [place] [address]     PATIENT NAME: Theodore Begum  : 1950  DATE: 10/31/24  MRN: [id number]      Billing Provider: Joseph Alex MD  Level of Service: [place] OFFICE/OUTPT VISIT, EST, LEVL IV, 30-39 MIN  Patient PCP Information       Provider PCP Type    Joseph Alex MD General            Reason for Visit / Chief Complaint: Toe Pain (Third toe on right foot is hurting. States it had a hard spot like a callus and it peeled off but now it is back) and Health Maintenance (Had eye exam in [place] with Dr. Garnica. Denies colonoscopy at this time)       Update PCP  Update Chief Complaint         History of Present Illness / Problem Focused Workflow     Theodore Begum presents to the clinic with Toe Pain (Third toe on right foot is hurting. States it had a hard spot like a callus and it peeled off but now it is back) and Health Maintenance (Had eye exam in [place] with Dr. Garnica. Denies colonoscopy at this time)     HPI    Review of Systems     Review of Systems   Constitutional:  Negative for activity change, appetite change, chills, fatigue and fever.   HENT:  Negative for nasal congestion, ear pain, hearing loss, postnasal drip and sore throat.    Respiratory:  Negative for cough, chest tightness, shortness of breath and wheezing.    Cardiovascular:  Negative for chest pain, palpitations, leg swelling and claudication.   Gastrointestinal:  Negative for abdominal pain, change in bowel habit, constipation, diarrhea, nausea and vomiting.   Genitourinary:  Negative for dysuria.   Musculoskeletal:  Negative for arthralgias, back pain, gait problem and myalgias.   Integumentary:  Positive for wound and mole/lesion. Negative for rash.   Neurological:  Negative for weakness and headaches.   Psychiatric/Behavioral:  Negative for suicidal ideas. The patient is not nervous/anxious.         Medical / Social / Family History     Past  Medical History:   Diagnosis Date    A-fib     Anemia     Hyperlipidemia     Hypertension        Past Surgical History:   Procedure Laterality Date    ABDOMINAL AORTA STENT      CARDIOVERSION      CHOLECYSTECTOMY      HERNIA REPAIR      PROSTATE SURGERY      REPLACEMENT OF STENT         Social History    reports that he has been smoking cigarettes. He started smoking about 54 years ago. He has a 54.1 pack-year smoking history. He has been exposed to tobacco smoke. He has never used smokeless tobacco. He reports that he does not drink alcohol and does not use drugs.    Family History  's family history includes No Known Problems in his father and mother.    Medications and Allergies     Medications  Outpatient Medications Marked as Taking for the 10/31/24 encounter (Office Visit) with Joseph Alex MD   Medication Sig Dispense Refill    albuterol (PROVENTIL/VENTOLIN HFA) 90 mcg/actuation inhaler INHALE 2 PUFFS BY MOUTH INTO THE LUNGS EVERY 6 HOURS AS NEEDED FOR WHEEZING OR RESCUE 18 g 5    amiodarone (PACERONE) 200 MG Tab Take 200 mg by mouth once daily.      apixaban (ELIQUIS) 5 mg Tab Take 1 tablet by mouth 2 (two) times daily.      aspirin (ECOTRIN) 81 MG EC tablet Take 81 mg by mouth once daily.      FARXIGA 10 mg tablet Take 10 mg by mouth once daily.      FEROSUL 325 mg (65 mg iron) Tab tablet Take 325 mg by mouth 2 (two) times daily.      furosemide (LASIX) 40 MG tablet Take 40 mg by mouth every morning.      losartan (COZAAR) 100 MG tablet Take 1 tablet (100 mg total) by mouth once daily. For BLOOD PRESSURE 90 tablet 1    metoprolol succinate (TOPROL-XL) 25 MG 24 hr tablet Take 25 mg by mouth once daily.      omeprazole (PRILOSEC) 20 MG capsule Take 1 capsule by mouth once daily.      oxybutynin (DITROPAN) 5 MG Tab Take 5 mg by mouth every evening.      spironolactone (ALDACTONE) 25 MG tablet Take 25 mg by mouth once daily.         Allergies  Review of patient's allergies indicates:   Allergen  Reactions    Moxifloxacin Shortness Of Breath    Cefaclor Other (See Comments)     Abdominal pain       Physical Examination     Vitals:    10/31/24 1349   BP: (P) 135/72   Pulse: (!) 55   Resp: 18   Temp: 98.3 °F (36.8 °C)     Physical Exam  Vitals and nursing note reviewed.   Constitutional:       General: He is not in acute distress.     Appearance: Normal appearance. He is not ill-appearing.   Eyes:      Extraocular Movements: Extraocular movements intact.      Pupils: Pupils are equal, round, and reactive to light.   Cardiovascular:      Rate and Rhythm: Normal rate and regular rhythm.   Pulmonary:      Effort: Pulmonary effort is normal.      Breath sounds: Normal breath sounds.   Skin:     General: Skin is warm.      Findings: No rash.   Neurological:      General: No focal deficit present.      Mental Status: He is alert and oriented to person, place, and time. Mental status is at baseline.   Psychiatric:         Mood and Affect: Mood normal.         Behavior: Behavior normal.        Right foot, middle toe, callus and wound underneath             Assessment and Plan (including Health Maintenance)      Problem List  Smart Sets  Document Outside HM   :    Plan:     Hemorrhoid cream given     Refer to Podiatry for callus and wound on right middle toe     Health Maintenance Due   Topic Date Due    Low Dose Statin  Never done    LDCT Lung Screen  Never done    RSV Vaccine (Age 60+ and Pregnant patients) (1 - Risk 60-74 years 1-dose series) Never done    Colorectal Cancer Screening  10/28/2019    Shingles Vaccine (2 of 2) 07/03/2024    Eye Exam  08/10/2024    COVID-19 Vaccine (6 - 2024-25 season) 09/01/2024       Problem List Items Addressed This Visit          Derm    Foot callus (Chronic)    Relevant Orders    Ambulatory referral/consult to Podiatry    Tinea pedis of right foot (Chronic)    Relevant Orders    Ambulatory referral/consult to Podiatry    Dermal mycosis (Chronic)    Relevant Medications     clotrimazole-betamethasone 1-0.05% (LOTRISONE) cream       Cardiac/Vascular    Mixed hyperlipidemia - Primary (Chronic)    Essential hypertension (Chronic)    Overview      - Goal blood pressure for most patients is less than 130/85. Both numbers are important. If you have questions about your specific goal blood pressure, please ask at your clinic visits.   - Check blood pressure outside of clinic, record the numbers, and bring the log to all your office visits.   - If you have any chest pain, SOB, or other concerning symptoms, report these immediately, or go to the nearest ER.    - Recommend cardiovascular exercise, at least 3 times per week, for at least 15 minutes.   - Eat a heart healthy diet.            Atrial fibrillation with RVR (Chronic)       Renal/    Stage 3a chronic kidney disease (Chronic)       GI    Hemorrhoids (Chronic)    Relevant Medications    hydrocortisone 2.5 % cream       Health Maintenance Topics with due status: Not Due       Topic Last Completion Date    PROSTATE-SPECIFIC ANTIGEN 12/08/2023    TETANUS VACCINE 05/08/2024    Foot Exam 06/17/2024    Hemoglobin A1c 06/17/2024    Diabetes Urine Screening 08/13/2024    Lipid Panel 10/04/2024       Procedures     Future Appointments   Date Time Provider Department Center   11/11/2024  3:15 PM Vicky Colunga MD [facility] [place]   12/16/2024  4:00 PM Jeni Greenberg, P OB [facility]   3/4/2025  3:15 PM Vicky Colunga MD [facility] [place]   10/10/2025  2:00 PM AWV NURSE, [facility] [place]        Follow up in about 3 months (around 1/31/2025), or if symptoms worsen or fail to improve, for hypertension, chronic health problems.     Signature:  Joseph Alex MD  [address] [place] [address]  Phone #: [phone]  Fax #: [phone]    Date of encounter: 10/31/24    There are no Patient Instructions on file for this visit.

## 2024-11-18 ENCOUNTER — OFFICE VISIT (OUTPATIENT)
Dept: DERMATOLOGY | Facility: CLINIC | Age: 74
End: 2024-11-18
Payer: MEDICARE

## 2024-11-18 DIAGNOSIS — D48.9 NEOPLASM OF UNCERTAIN BEHAVIOR: ICD-10-CM

## 2024-11-18 DIAGNOSIS — L57.0 AK (ACTINIC KERATOSIS): ICD-10-CM

## 2024-11-18 DIAGNOSIS — L08.9 SKIN INFECTION: ICD-10-CM

## 2024-11-18 DIAGNOSIS — L21.9 SEBORRHEIC DERMATITIS: Primary | ICD-10-CM

## 2024-11-18 PROCEDURE — 87070 CULTURE OTHR SPECIMN AEROBIC: CPT | Mod: ,,, | Performed by: CLINICAL MEDICAL LABORATORY

## 2024-11-18 PROCEDURE — 88305 TISSUE EXAM BY PATHOLOGIST: CPT | Mod: TC,SUR | Performed by: DERMATOLOGY

## 2024-11-18 PROCEDURE — 87186 SC STD MICRODIL/AGAR DIL: CPT | Mod: ,,, | Performed by: CLINICAL MEDICAL LABORATORY

## 2024-11-18 PROCEDURE — 88305 TISSUE EXAM BY PATHOLOGIST: CPT | Mod: 26,,, | Performed by: PATHOLOGY

## 2024-11-18 PROCEDURE — 87077 CULTURE AEROBIC IDENTIFY: CPT | Mod: ,,, | Performed by: CLINICAL MEDICAL LABORATORY

## 2024-11-18 RX ORDER — CLOBETASOL PROPIONATE 0.5 MG/ML
SOLUTION TOPICAL
Qty: 50 ML | Refills: 11 | Status: SHIPPED | OUTPATIENT
Start: 2024-11-18

## 2024-11-18 RX ORDER — DOXYCYCLINE 100 MG/1
100 CAPSULE ORAL 2 TIMES DAILY
Qty: 28 CAPSULE | Refills: 0 | Status: SHIPPED | OUTPATIENT
Start: 2024-11-18 | End: 2024-12-02

## 2024-11-18 NOTE — PROGRESS NOTES
[facility]   Vicky Colunga MD    Patient Name: Theodore Begum  Patient YOB: 1950   Date of Service: 11/18/24    CC: Follow-up Lesions    HPI: Theodore Begum is a 74 y.o. male here today for follow-up of lesions, last seen 09/24.  Previous treatments include biopsy.  Overall, the lesions is stable.  Treatment plan was followed as directed.    Past Medical History:   Diagnosis Date    A-fib     Anemia     Hyperlipidemia     Hypertension      Past Surgical History:   Procedure Laterality Date    ABDOMINAL AORTA STENT      CARDIOVERSION      CHOLECYSTECTOMY      HERNIA REPAIR      PROSTATE SURGERY      REPLACEMENT OF STENT       Review of patient's allergies indicates:   Allergen Reactions    Moxifloxacin Shortness Of Breath    Cefaclor Other (See Comments)     Abdominal pain       Current Outpatient Medications:     albuterol (PROVENTIL/VENTOLIN HFA) 90 mcg/actuation inhaler, INHALE 2 PUFFS BY MOUTH INTO THE LUNGS EVERY 6 HOURS AS NEEDED FOR WHEEZING OR RESCUE, Disp: 18 g, Rfl: 5    amiodarone (PACERONE) 200 MG Tab, Take 200 mg by mouth once daily., Disp: , Rfl:     apixaban (ELIQUIS) 5 mg Tab, Take 1 tablet by mouth 2 (two) times daily., Disp: , Rfl:     aspirin (ECOTRIN) 81 MG EC tablet, Take 81 mg by mouth once daily., Disp: , Rfl:     clobetasoL (TEMOVATE) 0.05 % external solution, Apply to scalp BID tapering with improvement, Disp: 50 mL, Rfl: 11    clotrimazole-betamethasone 1-0.05% (LOTRISONE) cream, Apply a thin layer of cream to the penis twice daily as needed for RASH, Disp: 45 g, Rfl: 2    doxycycline (VIBRAMYCIN) 100 MG Cap, Take 1 capsule (100 mg total) by mouth 2 (two) times a day. for 14 days, Disp: 28 capsule, Rfl: 0    FARXIGA 10 mg tablet, Take 10 mg by mouth once daily., Disp: , Rfl:     FEROSUL 325 mg (65 mg iron) Tab tablet, Take 325 mg by mouth 2 (two) times daily., Disp: , Rfl:     furosemide (LASIX) 40 MG tablet, Take 40 mg by mouth every morning., Disp: , Rfl:      hydrocortisone 2.5 % cream, Apply a thin layer of cream to the rectum twice daily as needed for HEMORRHOIDS, Disp: 56 g, Rfl: 2    losartan (COZAAR) 100 MG tablet, Take 1 tablet (100 mg total) by mouth once daily. For BLOOD PRESSURE, Disp: 90 tablet, Rfl: 1    metoprolol succinate (TOPROL-XL) 25 MG 24 hr tablet, Take 25 mg by mouth once daily., Disp: , Rfl:     omeprazole (PRILOSEC) 20 MG capsule, Take 1 capsule by mouth once daily., Disp: , Rfl:     oxybutynin (DITROPAN) 5 MG Tab, Take 5 mg by mouth every evening., Disp: , Rfl:     spironolactone (ALDACTONE) 25 MG tablet, Take 25 mg by mouth once daily., Disp: , Rfl:     ROS: A focused review of systems was obtained and negative.     Exam: A focused skin exam was performed. All areas examined were normal except as mentioned in the assessment and plan below.  General Appearance of the patient is well developed and well nourished.  Orientation: alert and oriented x 3.  Mood and affect: pleasant.    Assessment:   The primary encounter diagnosis was Seborrheic dermatitis. Diagnoses of Neoplasm of uncertain behavior, Skin infection, and AK (actinic keratosis) were also pertinent to this visit.    Plan:   Medications Ordered This Encounter   Medications    clobetasoL (TEMOVATE) 0.05 % external solution     Sig: Apply to scalp BID tapering with improvement     Dispense:  50 mL     Refill:  11    doxycycline (VIBRAMYCIN) 100 MG Cap     Sig: Take 1 capsule (100 mg total) by mouth 2 (two) times a day. for 14 days     Dispense:  28 capsule     Refill:  0       Actinic Keratoses(L57.0)  - Erythematous patches and papules with hyperkeratotic scale distributed on the bilateral arms.    Plan: Counseling.  I counseled the patient regarding the following:  Skin Care: Sun protective clothing and broad spectrum sunscreen can prevent the formation of Actinic  Keratoses. AKs can resolve with cryotherapy, photodynamic therapy, imiquimod, topical 5-FU.  Expectations: Actinic Keratoses  are precancerous proliferations that occur within sun damaged skin. If untreated,  a small subset of AKs can develop into Squamous Cell Carcinoma.  Contact Office if: If AKs fail to resolve despite treatment, or if you develop a side effect from therapy, such as  unbearable crusting, scabbing, redness and tenderness.    Plan: Liquid Nitrogen.  A total of 10 lesions were treated with liquid nitrogen for 2 freeze-thaw cycles lasting 5 seconds, located on the above locations.   The patient's consent was obtained including but not limited to risks of crusting, scabbing,  blistering, scarring, darker or lighter pigmentary change, recurrence, incomplete removal and infection.      Neoplasm of Uncertain Behavior (D48.5)  - Keratotic nodule located on the right proximal lateral forearm   Ddx includes: PN vs SCC    Plan: Counseling.  I counseled the patient regarding the following:  Instructions: Neoplasms of Uncertain Behavior can be observed, biopsied or surgically removed depending on the  level of clinical suspicion.  Instructions: Neoplasms of Uncertain Behavior can be observed, biopsied or surgically removed depending on the  level of clinical suspicion.  Contact Office if: patient develops any new lesions that fail to heal, ulcerate or bleed.    Plan: Biopsy by Shave Method.  Location (1): Right proximal lateral forearm  Written consent was obtained and risks were reviewed including but not  limited to scarring, infection, bleeding, scabbing, incomplete removal, nerve damage and allergy to anesthesia.  The area was prepped with Chloraprep. Local anesthesia was obtained with approximately 0.5cc of 1% lidocaine  with epinephrine. A biopsy by shave method to the level of the dermis (sent for H and E) was performed using  a Dermablade on the above location. Aluminum chloride was used for hemostasis. Following the biopsy  Petrolatum and a bandage were applied. Patient will be notified of biopsy results. However, patient  instructed to  call the office if not contacted within 2 weeks.      Skin Infection, NOS   - crusting of seb derm    Plan: Counseling  I counseled the patient regarding the following:  Skin care: Patients with purulence or fluid collections should have their wounds re-opened, drained, cultured and irrigated. All wound infections should be treated with antibiotics.  Expectations: Wound Infections usually occur 4-7 days postoperatively. Patients exhibit, pain, rednes, swelling, cellulitic changes and fever.  Contact Office if: Wound Infection fails to respond to treatment or worsens, patient develops a fever, or if redness spreads despite antibiotics.    A bacterial culture was obtained from the scalp    - Will treat with doxycycline per last positive culture while waiting for culture results      Seborrheic Dermatitis  - Pink/orange scaly plaques located on the scalp, nasolabial folds, and eyebrows    Status: Inadequately controlled       Plan: Counseling.  I counseled the patient regarding the following:  Skin care: Emollients, shampoos with tar, selenium or zinc pyrithione can improve seborrheic dermatitis.  Expectations: Seborrheic Dermatitis is chronic in nature with periods of remissions and flares. Flares can be  triggered by stress.  Contact office if: Seborrheic dermatitis worsens, or fails to improve despite several months of treatment.    Plan: Prescription     - Will refill clobetasol    Follow up if symptoms worsen or fail to improve, for FSE already scheduled.    Vicky Colunga MD

## 2024-11-20 DIAGNOSIS — L89.892 PRESSURE INJURY OF TOE OF RIGHT FOOT, STAGE 2: Primary | ICD-10-CM

## 2024-11-20 LAB
ESTROGEN SERPL-MCNC: NORMAL PG/ML
INSULIN SERPL-ACNC: NORMAL U[IU]/ML
LAB AP GROSS DESCRIPTION: NORMAL
LAB AP LABORATORY NOTES: NORMAL
LAB AP SPEC A DDX: NORMAL
LAB AP SPEC A MORPHOLOGY: NORMAL
LAB AP SPEC A PROCEDURE: NORMAL
MICROORGANISM SPEC CULT: ABNORMAL
T3RU NFR SERPL: NORMAL %

## 2024-11-20 NOTE — TELEPHONE ENCOUNTER
----- Message from Sendy sent at 11/20/2024  8:05 AM CST -----  Patient would like antibiotics sent in [facility] in [place], Toe is a whole lot worse, severely infected, Patient has not heard from external team. Attempted to contact referral team to schedule podiatry appointment and the phone just rang. Patient call back is  [phone]. States if he needs to be seen for antibiotics to call him and he will come in.

## 2024-11-21 RX ORDER — CLINDAMYCIN HYDROCHLORIDE 300 MG/1
CAPSULE ORAL
Qty: 30 CAPSULE | Refills: 0 | Status: SHIPPED | OUTPATIENT
Start: 2024-11-21

## 2024-11-22 ENCOUNTER — OFFICE VISIT (OUTPATIENT)
Dept: FAMILY MEDICINE | Facility: CLINIC | Age: 74
End: 2024-11-22
Payer: MEDICARE

## 2024-11-22 VITALS
RESPIRATION RATE: 18 BRPM | BODY MASS INDEX: 26.01 KG/M2 | OXYGEN SATURATION: 98 % | TEMPERATURE: 99 F | DIASTOLIC BLOOD PRESSURE: 73 MMHG | HEIGHT: 72 IN | SYSTOLIC BLOOD PRESSURE: 178 MMHG | HEART RATE: 96 BPM | WEIGHT: 192 LBS

## 2024-11-22 DIAGNOSIS — L84 FOOT CALLUS: Chronic | ICD-10-CM

## 2024-11-22 DIAGNOSIS — L97.518 DIABETIC ULCER OF TOE OF RIGHT FOOT ASSOCIATED WITH TYPE 2 DIABETES MELLITUS, WITH OTHER ULCER SEVERITY: Primary | Chronic | ICD-10-CM

## 2024-11-22 DIAGNOSIS — E11.621 DIABETIC ULCER OF TOE OF RIGHT FOOT ASSOCIATED WITH TYPE 2 DIABETES MELLITUS, WITH OTHER ULCER SEVERITY: Primary | Chronic | ICD-10-CM

## 2024-11-22 PROCEDURE — 99213 OFFICE O/P EST LOW 20 MIN: CPT | Mod: ,,, | Performed by: FAMILY MEDICINE

## 2024-11-22 NOTE — PROGRESS NOTES
Joseph Alex MD    [address] [place] [address]     PATIENT NAME: Theodore Begum  : 1950  DATE: 24  MRN: [id number]      Billing Provider: Joseph Alex MD  Level of Service: HI OFFICE/OUTPT VISIT, EST, LEVL III, 20-29 MIN  Patient PCP Information       Provider PCP Type    Joseph Alex MD General            Reason for Visit / Chief Complaint: Toe Pain (Right middle toe infected. Waiting on podiatry referral.)       Update PCP  Update Chief Complaint         History of Present Illness / Problem Focused Workflow     Theodore Begum presents to the clinic with Toe Pain (Right middle toe infected. Waiting on podiatry referral.)         HPI    Review of Systems     Review of Systems   Constitutional:  Negative for activity change, appetite change, chills, fatigue and fever.   HENT:  Negative for nasal congestion, ear pain, hearing loss, postnasal drip and sore throat.    Respiratory:  Negative for cough, chest tightness, shortness of breath and wheezing.    Cardiovascular:  Negative for chest pain, palpitations, leg swelling and claudication.   Gastrointestinal:  Negative for abdominal pain, change in bowel habit, constipation, diarrhea, nausea and vomiting.   Genitourinary:  Negative for dysuria.   Musculoskeletal:  Negative for arthralgias, back pain, gait problem and myalgias.   Integumentary:  Positive for wound. Negative for rash.   Neurological:  Negative for weakness and headaches.   Psychiatric/Behavioral:  Negative for suicidal ideas. The patient is not nervous/anxious.         Medical / Social / Family History     Past Medical History:   Diagnosis Date    A-fib     Anemia     Hyperlipidemia     Hypertension        Past Surgical History:   Procedure Laterality Date    ABDOMINAL AORTA STENT      CARDIOVERSION      CHOLECYSTECTOMY      HERNIA REPAIR      PROSTATE SURGERY      REPLACEMENT OF STENT         Social History    reports that  he has been smoking cigarettes. He started smoking about 54 years ago. He has a 54.1 pack-year smoking history. He has been exposed to tobacco smoke. He has never used smokeless tobacco. He reports that he does not drink alcohol and does not use drugs.    Family History  's family history includes No Known Problems in his father and mother.    Medications and Allergies     Medications  Outpatient Medications Marked as Taking for the 11/22/24 encounter (Office Visit) with Joseph Alex MD   Medication Sig Dispense Refill    albuterol (PROVENTIL/VENTOLIN HFA) 90 mcg/actuation inhaler INHALE 2 PUFFS BY MOUTH INTO THE LUNGS EVERY 6 HOURS AS NEEDED FOR WHEEZING OR RESCUE 18 g 5    amiodarone (PACERONE) 200 MG Tab Take 200 mg by mouth once daily.      apixaban (ELIQUIS) 5 mg Tab Take 1 tablet by mouth 2 (two) times daily.      aspirin (ECOTRIN) 81 MG EC tablet Take 81 mg by mouth once daily.      clindamycin (CLEOCIN) 300 MG capsule Take 1 tablet by mouth 3 times daily for 10 days for INFECTION 30 capsule 0    clobetasoL (TEMOVATE) 0.05 % external solution Apply to scalp BID tapering with improvement 50 mL 11    clotrimazole-betamethasone 1-0.05% (LOTRISONE) cream Apply a thin layer of cream to the penis twice daily as needed for RASH 45 g 2    doxycycline (VIBRAMYCIN) 100 MG Cap Take 1 capsule (100 mg total) by mouth 2 (two) times a day. for 14 days 28 capsule 0    FARXIGA 10 mg tablet Take 10 mg by mouth once daily.      FEROSUL 325 mg (65 mg iron) Tab tablet Take 325 mg by mouth 2 (two) times daily.      furosemide (LASIX) 40 MG tablet Take 40 mg by mouth every morning.      hydrocortisone 2.5 % cream Apply a thin layer of cream to the rectum twice daily as needed for HEMORRHOIDS 56 g 2    losartan (COZAAR) 100 MG tablet Take 1 tablet (100 mg total) by mouth once daily. For BLOOD PRESSURE 90 tablet 1    metoprolol succinate (TOPROL-XL) 25 MG 24 hr tablet Take 25 mg by mouth once daily.      omeprazole  (PRILOSEC) 20 MG capsule Take 1 capsule by mouth once daily.      oxybutynin (DITROPAN) 5 MG Tab Take 5 mg by mouth every evening.      spironolactone (ALDACTONE) 25 MG tablet Take 25 mg by mouth once daily.         Allergies  Review of patient's allergies indicates:   Allergen Reactions    Moxifloxacin Shortness Of Breath    Cefaclor Other (See Comments)     Abdominal pain       Physical Examination     Vitals:    11/22/24 1347   BP: (!) 178/73   Pulse: 96   Resp: 18   Temp: 98.6 °F (37 °C)     Physical Exam  Vitals and nursing note reviewed.   Constitutional:       General: He is not in acute distress.     Appearance: Normal appearance. He is not ill-appearing.   Eyes:      Extraocular Movements: Extraocular movements intact.      Pupils: Pupils are equal, round, and reactive to light.   Cardiovascular:      Rate and Rhythm: Normal rate and regular rhythm.   Pulmonary:      Effort: Pulmonary effort is normal.      Breath sounds: Normal breath sounds.   Musculoskeletal:        Feet:    Feet:      Comments: See image below.       Skin:     General: Skin is warm.      Findings: No rash.   Neurological:      General: No focal deficit present.      Mental Status: He is alert and oriented to person, place, and time. Mental status is at baseline.   Psychiatric:         Mood and Affect: Mood normal.         Behavior: Behavior normal.        Image from 11/22/2024,           Previous images from 1 month ago, as seen below                 Assessment and Plan (including Health Maintenance)      Problem List  Smart Sets  Document Outside HM   :    Plan:     The wound looks very similar to the previous images. However he still needs Podiatry and/or wound care. We have called about the Podiatry referral and that had not even been started on by the referral team, and it was from a month ago. Also started a referral to Wound care today    Health Maintenance Due   Topic Date Due    Low Dose Statin  Never done    LDCT Lung Screen   Never done    RSV Vaccine (Age 60+ and Pregnant patients) (1 - Risk 60-74 years 1-dose series) Never done    Colorectal Cancer Screening  10/28/2019    Shingles Vaccine (2 of 2) 07/03/2024    Eye Exam  08/10/2024    COVID-19 Vaccine (6 - 2024-25 season) 09/01/2024    Hemoglobin A1c  12/17/2024       Problem List Items Addressed This Visit          Derm    Foot callus (Chronic)    Relevant Orders    Ambulatory referral/consult to Wound Clinic     Other Visit Diagnoses       Diabetic ulcer of toe of right foot associated with type 2 diabetes mellitus, with other ulcer severity  (Chronic)   -  Primary    Relevant Orders    Ambulatory referral/consult to Wound Clinic            Health Maintenance Topics with due status: Not Due       Topic Last Completion Date    PROSTATE-SPECIFIC ANTIGEN 12/08/2023    TETANUS VACCINE 05/08/2024    Foot Exam 06/17/2024    Diabetes Urine Screening 08/13/2024    Lipid Panel 10/04/2024       Procedures     Future Appointments   Date Time Provider Department Center   12/5/2024  3:00 PM Vicky Colunga MD [facility] D[facility] [place]   12/16/2024  4:00 PM Jeni Greenberg, FNP OB NEPH [facility]   3/4/2025  3:15 PM Vicky Colunga MD [facility] DER[facility] [place]   10/10/2025  2:00 PM AWV NURSE, [facility] FAMILY MEDICINE [facility] [place]        Follow up if symptoms worsen or fail to improve.     Signature:  Joseph Alex MD  [address] [place] [address]  Phone #: [phone]  Fax #: [phone]    Date of encounter: 11/22/24    There are no Patient Instructions on file for this visit.

## 2024-12-05 ENCOUNTER — PROCEDURE VISIT (OUTPATIENT)
Dept: DERMATOLOGY | Facility: CLINIC | Age: 74
End: 2024-12-05
Payer: MEDICARE

## 2024-12-05 VITALS — HEART RATE: 51 BPM | DIASTOLIC BLOOD PRESSURE: 68 MMHG | SYSTOLIC BLOOD PRESSURE: 134 MMHG

## 2024-12-05 DIAGNOSIS — L57.0 AK (ACTINIC KERATOSIS): ICD-10-CM

## 2024-12-05 DIAGNOSIS — C44.92 SCC (SQUAMOUS CELL CARCINOMA): Primary | ICD-10-CM

## 2024-12-05 PROCEDURE — 88305 TISSUE EXAM BY PATHOLOGIST: CPT | Mod: 26,,, | Performed by: PATHOLOGY

## 2024-12-05 PROCEDURE — 88305 TISSUE EXAM BY PATHOLOGIST: CPT | Mod: TC,SUR | Performed by: DERMATOLOGY

## 2024-12-05 NOTE — PROGRESS NOTES
[facility]   Vicky Colunga MD    Patient Name: Theodore Begum  Patient YOB: 1950   Date of Service: 12/5/24    CC: Excision    HPI: Theodore Begum is a 74 y.o. male here today for excision of SCC, located on the right proximal lateral forearm.      Past Medical History:   Diagnosis Date    A-fib     Anemia     Hyperlipidemia     Hypertension      Past Surgical History:   Procedure Laterality Date    ABDOMINAL AORTA STENT      CARDIOVERSION      CHOLECYSTECTOMY      HERNIA REPAIR      PROSTATE SURGERY      REPLACEMENT OF STENT       Review of patient's allergies indicates:   Allergen Reactions    Moxifloxacin Shortness Of Breath    Cefaclor Other (See Comments)     Abdominal pain       Current Outpatient Medications:     albuterol (PROVENTIL/VENTOLIN HFA) 90 mcg/actuation inhaler, INHALE 2 PUFFS BY MOUTH INTO THE LUNGS EVERY 6 HOURS AS NEEDED FOR WHEEZING OR RESCUE, Disp: 18 g, Rfl: 5    amiodarone (PACERONE) 200 MG Tab, Take 200 mg by mouth once daily., Disp: , Rfl:     apixaban (ELIQUIS) 5 mg Tab, Take 1 tablet by mouth 2 (two) times daily., Disp: , Rfl:     aspirin (ECOTRIN) 81 MG EC tablet, Take 81 mg by mouth once daily., Disp: , Rfl:     clindamycin (CLEOCIN) 300 MG capsule, Take 1 tablet by mouth 3 times daily for 10 days for INFECTION, Disp: 30 capsule, Rfl: 0    clobetasoL (TEMOVATE) 0.05 % external solution, Apply to scalp BID tapering with improvement, Disp: 50 mL, Rfl: 11    clotrimazole-betamethasone 1-0.05% (LOTRISONE) cream, Apply a thin layer of cream to the penis twice daily as needed for RASH, Disp: 45 g, Rfl: 2    FARXIGA 10 mg tablet, Take 10 mg by mouth once daily., Disp: , Rfl:     FEROSUL 325 mg (65 mg iron) Tab tablet, Take 325 mg by mouth 2 (two) times daily., Disp: , Rfl:     furosemide (LASIX) 40 MG tablet, Take 40 mg by mouth every morning., Disp: , Rfl:     hydrocortisone 2.5 % cream, Apply a thin layer of cream to the rectum twice daily as needed for HEMORRHOIDS, Disp:  56 g, Rfl: 2    losartan (COZAAR) 100 MG tablet, Take 1 tablet (100 mg total) by mouth once daily. For BLOOD PRESSURE, Disp: 90 tablet, Rfl: 1    metoprolol succinate (TOPROL-XL) 25 MG 24 hr tablet, Take 25 mg by mouth once daily., Disp: , Rfl:     omeprazole (PRILOSEC) 20 MG capsule, Take 1 capsule by mouth once daily., Disp: , Rfl:     oxybutynin (DITROPAN) 5 MG Tab, Take 5 mg by mouth every evening., Disp: , Rfl:     spironolactone (ALDACTONE) 25 MG tablet, Take 25 mg by mouth once daily., Disp: , Rfl:     ROS: A focused review of systems was obtained and negative.     Exam: A focused skin exam was performed and the biopsy site was identified.  General Appearance of the patient is well developed and well nourished.  Orientation: alert and oriented x 3.  Mood and affect: pleasant.    Vitals:    12/05/24 1510   BP: 134/68   Pulse: (!) 51       Assessment:   The primary encounter diagnosis was SCC (squamous cell carcinoma). A diagnosis of AK (actinic keratosis) was also pertinent to this visit.    Plan:  Excision  Accession Number: S  Biopsy Photograph Reviewed: Yes    Procedure Note    Location (A): Right proximal lateral forearm  Preop Size: 1.3 x 1.1 cm  Margin: 0.4 cm  Total Excised Diameter: 2.1 x 1.9 cm  Procedure: Excision - Elliptical  Anesthesia: local infiltration-1% lidocaine with epinephrine (12 cc)  Estimated Blood Loss: minimal  Complications: none    Repair Type: Intermediate  Repair Length: 4.3 cm    Consent was obtained from the patient. The risks and benefits to therapy were discussed in detail. Specifically, the risks of infection, scarring, bleeding, prolonged wound healing, incomplete removal, allergy to anesthesia, nerve injury and recurrence were addressed. Prior to the procedure, the treatment site was clearly identified and confirmed by the patient. All components of Universal Protocol/PAUSE Rule completed.    Procedure: The patient was placed in a comfortable position exposing the surgical  site. The area was prepped with Hibiclens and draped in the usual fashion. An elliptical excision was performed, on the above listed location The margin was drawn around the clinically apparent lesion. An elliptical shape was then drawn on the skin incorporating the lesion and margins. Incisions were then made along these lines to the appropriate tissue plane and the lesion was extirpated. A 15 blade was used. The lesion was excised to the layer of the adipose tissue, removed and sent to pathology for processing and histologic evaluation.    Intermediate layered repair was performed because closure of the subcutaneous tissue and superficial non-muscular fascia was required, because damaged skin surrounding defect made closure difficult, because extensive undermining required, and because Burow's triangles were required. Undermining was performed with blunt dissection. Hemostasis was achieved with electrocautery. The subcutaneous tissue and dermis were closed with 4-0 Vicryl (interrupted). Epidermal closure was achieved with 4-0 Ethilon(interrupted). Petrolatum + dry sterile dressing were applied. I reviewed with the patient in detail post-care instructions. Patient is not to engage in any heavy lifting, exercise, or swimming for the next 14 days. Should the patient develop any fevers, chills, bleeding, severe pain patient will contact the office immediately. Suture removal in  12 days .    Actinic Keratoses(L57.0)  - Erythematous patches and papules with hyperkeratotic scale distributed on the bilateral arms.    Plan: Counseling.  I counseled the patient regarding the following:  Skin Care: Sun protective clothing and broad spectrum sunscreen can prevent the formation of Actinic  Keratoses. AKs can resolve with cryotherapy, photodynamic therapy, imiquimod, topical 5-FU.  Expectations: Actinic Keratoses are precancerous proliferations that occur within sun damaged skin. If untreated,  a small subset of AKs can  develop into Squamous Cell Carcinoma.  Contact Office if: If AKs fail to resolve despite treatment, or if you develop a side effect from therapy, such as  unbearable crusting, scabbing, redness and tenderness.    Plan: Liquid Nitrogen.  A total of 6 lesions were treated with liquid nitrogen for 2 freeze-thaw cycles lasting 5 seconds, located on the above locations.   The patient's consent was obtained including but not limited to risks of crusting, scabbing,  blistering, scarring, darker or lighter pigmentary change, recurrence, incomplete removal and infection.            Follow up in about 12 days (around 12/17/2024) for SR Jalil Colunga MD

## 2024-12-12 DIAGNOSIS — J41.0 SIMPLE CHRONIC BRONCHITIS: Chronic | ICD-10-CM

## 2024-12-12 RX ORDER — ALBUTEROL SULFATE 90 UG/1
2 INHALANT RESPIRATORY (INHALATION) EVERY 6 HOURS PRN
Qty: 18 G | Refills: 5 | Status: SHIPPED | OUTPATIENT
Start: 2024-12-12 | End: 2024-12-13 | Stop reason: SDUPTHER

## 2024-12-12 NOTE — TELEPHONE ENCOUNTER
----- Message from Vivian sent at 12/11/2024  3:41 PM CST -----  Theodore Begum (MRN: [id number]) is asking for refill of his VENTOLIN 90 mcg/actuation inhaler to [facility]

## 2024-12-13 DIAGNOSIS — J41.0 SIMPLE CHRONIC BRONCHITIS: Chronic | ICD-10-CM

## 2024-12-13 RX ORDER — ALBUTEROL SULFATE 90 UG/1
2 INHALANT RESPIRATORY (INHALATION) EVERY 6 HOURS PRN
Qty: 18 G | Refills: 5 | Status: SHIPPED | OUTPATIENT
Start: 2024-12-13

## 2024-12-16 ENCOUNTER — OFFICE VISIT (OUTPATIENT)
Dept: NEPHROLOGY | Facility: CLINIC | Age: 74
End: 2024-12-16
Payer: MEDICARE

## 2024-12-16 ENCOUNTER — CLINICAL SUPPORT (OUTPATIENT)
Dept: DERMATOLOGY | Facility: CLINIC | Age: 74
End: 2024-12-16
Payer: MEDICARE

## 2024-12-16 VITALS
RESPIRATION RATE: 18 BRPM | SYSTOLIC BLOOD PRESSURE: 134 MMHG | DIASTOLIC BLOOD PRESSURE: 72 MMHG | HEART RATE: 56 BPM | WEIGHT: 190 LBS | OXYGEN SATURATION: 98 % | BODY MASS INDEX: 25.73 KG/M2 | HEIGHT: 72 IN

## 2024-12-16 DIAGNOSIS — Z48.02 ENCOUNTER FOR REMOVAL OF SUTURES: Primary | ICD-10-CM

## 2024-12-16 DIAGNOSIS — N18.4 CKD (CHRONIC KIDNEY DISEASE) STAGE 4, GFR 15-29 ML/MIN: Primary | ICD-10-CM

## 2024-12-16 PROCEDURE — 99213 OFFICE O/P EST LOW 20 MIN: CPT | Mod: S$PBB,,, | Performed by: NURSE PRACTITIONER

## 2024-12-16 PROCEDURE — 99215 OFFICE O/P EST HI 40 MIN: CPT | Mod: PBBFAC | Performed by: NURSE PRACTITIONER

## 2024-12-16 PROCEDURE — 99999 PR PBB SHADOW E&M-EST. PATIENT-LVL V: CPT | Mod: PBBFAC,,, | Performed by: NURSE PRACTITIONER

## 2024-12-16 NOTE — PROGRESS NOTES
[facility] History and Physical  Patient Name: Theodore Begum  MRN: [id number]  Age: 74 y.o.  : 1950    Date: 2024 3:37 PM    Chief Complaint: Follow Up    HPI :   Mr Begum presents to Nephrology clinic for routine follow up. He is followed by Dr. Alex as his primary care provider. Previously seen by Nephrology at [facility].     Cardiac ablation 2 years ago for A-Fib: Takes ASA 81 mg and Eliquis 5 mg BID    HTN: diagnosed in his 40's. Current regimen includes Lasix 40 mg daily, losartan 100 mg daily, Toprol-XL 25 mg daily, spironolactone 25 mg daily    DM2: diagnosed about 10 years ago. Current regimen includes Farxiga 10 mg daily    Hx of prostate CA 10 years ago.     Nephrology history: No FH of kidney disease, no nephrolithiasis, or recurrent UTIs. No OTC medications including NSAIDs. His two brothers passed from Amyloidosis, per patient he had genetic testing in the past that showed he did not have Amyloidosis gene.     Patient denies any CP, SOB, peripheral edema, dysuria, hematuria, changes in urinary habits, or increased frequency of urination.     Past Medical History:  has a past medical history of A-fib, Anemia, Hyperlipidemia, and Hypertension.     Past Surgical History:   has a past surgical history that includes Hernia repair; Prostate surgery; Cholecystectomy; Replacement of stent; Abdominal aorta stent; and Cardioversion.     Family History:  family history includes No Known Problems in his father and mother. No family history of kidney disease. Brothers  from Amyloidosis     Social History:   reports that he has been smoking cigarettes. He started smoking about 54 years ago. He has a 54.2 pack-year smoking history. He has been exposed to tobacco smoke. He has never used smokeless tobacco. He reports that he does not drink alcohol and does not use drugs.     Allergies: is allergic to moxifloxacin and cefaclor.     Medications: Reviewed  including OTC medications, herbal supplements, and NSAIDS.     Old records have been reviewed.      Review of Systems:  ROS: A 10 point ROS was completed and found to be negative except for that mentioned above.          Physical Exam:  Vitals:    12/16/24 0807   BP: 134/72   Pulse: (!) 56   Resp: 18       Constitutional: sitting in chair, in NAD  Eyes: EOMI, white sclera  ENMT: moist mucus membranes, nares patent  Cardiovascular: normal rate, S1/S2 noted, no edema  Respiratory: symmetrical chest expansion, CTA-B  Gastrointestinal: +BS, soft, NT/ND  Musculoskeletal: normal, no joint erythema/effusions  Skin: no rash, no purpura, warm extremities  Neurological: Alert and Oriented x 3, afocal    Labs:   Lab Results   Component Value Date     08/13/2024    K 3.9 08/13/2024    CREATININE 2.57 (H) 08/13/2024    ALT 21 06/17/2024    AST 16 06/17/2024    HGBA1C 5.3 06/17/2024    LDLCALC 67 10/04/2024    CHOL 133 10/04/2024    HDL 41 10/04/2024    TRIG 126 10/04/2024    WBC 5.98 08/13/2024    HGB 13.6 08/13/2024    HCT 43.7 08/13/2024     (L) 08/13/2024    PSA <0.010 12/08/2023        Otherwise Reviewed    Assessment/Plan:       CKD stage IV in setting of hypertensive nephrosclerosis, diabetic nephropathy, and nicotine dependence (cigarettes). Baseline sCr TBD, today sCr pending. Counseled to avoid nephrotoxic agents such as NSAIDs. Renal U/S from 2023 reviewed and discussed with patient.     Complaining today of having urinary frequency and dribbling. Denies any burning with urination but states that he will void and 15 minutes later will need to void again and will have intermittent urinary leakage when he coughs or bares down. Has previously followed with Dr. Johnston at [facility]. Advised to call and get appointment with Dr. Johnston for evaluation. Will check U/A today.     Advised patient to take Lasix 40 mg PRN in the AM related to edema to LE. Hx of HFpEF 65%, taking Spironolactone and Farxiga.      Proteinuria: urine Prot:Creat ratio is pending. Patient is on RAAS blockade with Losartan and SPLT.     Anemia: CBC pending    BMD: Calcium and Phosphorus PTH, Vit D pending    HTN: well controlled with current meds     DM type 2: on ARB, SGTL2i    RTC 4 months with CBC, RFP, UA, urine for Prot:creat ratio, PTH, Vit D      Signature:             EMANI Lundy  [facility]  [address] [facility] [address]  [phone]        25 minutes of total time spent on the encounter, which includes face to face time and non-face to face time preparing to see the patient (eg, review of tests), Obtaining and/or reviewing separately obtained history, Documenting clinical information in the electronic or other health record, Independently interpreting results (not separately reported) and communicating results to the patient/family/caregiver, or Care coordination (not separately reported).       Date of encounter: 12/16/24

## 2024-12-16 NOTE — PROGRESS NOTES
[facility]   Vicky Colunga MD    Patient Name: Theodore Begum  Patient YOB: 1950   Date of Service: 12/16/24    CC: Suture removal    HPI: Theodore Begum is a 74 y.o. male here today for suture removal on the right proximal lateral forearm.  The area is healing well.  Patient denies fever, chills, puss, or redness to the area.    Past Medical History:   Diagnosis Date    A-fib     Anemia     Hyperlipidemia     Hypertension      Past Surgical History:   Procedure Laterality Date    ABDOMINAL AORTA STENT      CARDIOVERSION      CHOLECYSTECTOMY      HERNIA REPAIR      PROSTATE SURGERY      REPLACEMENT OF STENT       Review of patient's allergies indicates:   Allergen Reactions    Moxifloxacin Shortness Of Breath    Cefaclor Other (See Comments)     Abdominal pain       Current Outpatient Medications:     albuterol (PROVENTIL/VENTOLIN HFA) 90 mcg/actuation inhaler, Inhale 2 puffs into the lungs every 6 (six) hours as needed for Wheezing. Rescue, Disp: 18 g, Rfl: 5    amiodarone (PACERONE) 200 MG Tab, Take 200 mg by mouth once daily., Disp: , Rfl:     apixaban (ELIQUIS) 5 mg Tab, Take 1 tablet by mouth 2 (two) times daily., Disp: , Rfl:     aspirin (ECOTRIN) 81 MG EC tablet, Take 81 mg by mouth once daily., Disp: , Rfl:     clobetasoL (TEMOVATE) 0.05 % external solution, Apply to scalp BID tapering with improvement, Disp: 50 mL, Rfl: 11    clotrimazole-betamethasone 1-0.05% (LOTRISONE) cream, Apply a thin layer of cream to the penis twice daily as needed for RASH, Disp: 45 g, Rfl: 2    FARXIGA 10 mg tablet, Take 10 mg by mouth once daily., Disp: , Rfl:     FEROSUL 325 mg (65 mg iron) Tab tablet, Take 325 mg by mouth 2 (two) times daily., Disp: , Rfl:     furosemide (LASIX) 40 MG tablet, Take 40 mg by mouth daily as needed (swelling)., Disp: , Rfl:     hydrocortisone 2.5 % cream, Apply a thin layer of cream to the rectum twice daily as needed for HEMORRHOIDS, Disp: 56 g, Rfl: 2    losartan (COZAAR) 100  MG tablet, Take 1 tablet (100 mg total) by mouth once daily. For BLOOD PRESSURE, Disp: 90 tablet, Rfl: 1    metoprolol succinate (TOPROL-XL) 25 MG 24 hr tablet, Take 25 mg by mouth once daily., Disp: , Rfl:     omeprazole (PRILOSEC) 20 MG capsule, Take 1 capsule by mouth once daily., Disp: , Rfl:     oxybutynin (DITROPAN) 5 MG Tab, Take 5 mg by mouth every evening., Disp: , Rfl:     spironolactone (ALDACTONE) 25 MG tablet, Take 25 mg by mouth once daily., Disp: , Rfl:       Exam: A focused skin exam was performed. All areas examined were normal except as mentioned in the assessment and plan below.  General Appearance of the patient is well developed and well nourished.  Orientation: alert and oriented x 3.  Mood and affect: pleasant.    Assessment:   There were no encounter diagnoses.    Plan:   Suture Removal (Global Period)  Body Locations:  Right proximal lateral forearm   I reviewed the pathology results with the patient in detail.  The examination of the site was clean, dry and intact. Sutures were removed.  Vaseline applied.    I counseled the patient regarding the following:  Expectations: Sutured wounds tend to have 50% of the strength of normal skin at the time of suture removal. Try avoiding rigorous exercise or lifting greater than 10 pounds.  Contact office if: you develop pain, redness, tenderness or pus at the surgical site.    A culture was not obtained from the area           Follow up if symptoms worsen or fail to improve.    Jose Armando Stevens RN

## 2024-12-16 NOTE — PATIENT INSTRUCTIONS
Hold Lasix (furosemide) take only as needed in the morning if swelling in lower extremities.     -Metoprolol is a beta blocker that slows your heart rate down.

## 2024-12-17 ENCOUNTER — TELEPHONE (OUTPATIENT)
Dept: NEPHROLOGY | Facility: CLINIC | Age: 74
End: 2024-12-17
Payer: MEDICARE

## 2024-12-17 ENCOUNTER — OFFICE VISIT (OUTPATIENT)
Dept: FAMILY MEDICINE | Facility: CLINIC | Age: 74
End: 2024-12-17
Payer: MEDICARE

## 2024-12-17 VITALS
DIASTOLIC BLOOD PRESSURE: 70 MMHG | TEMPERATURE: 98 F | OXYGEN SATURATION: 95 % | SYSTOLIC BLOOD PRESSURE: 148 MMHG | BODY MASS INDEX: 26.28 KG/M2 | RESPIRATION RATE: 18 BRPM | HEART RATE: 56 BPM | WEIGHT: 194 LBS | HEIGHT: 72 IN

## 2024-12-17 DIAGNOSIS — R60.0 BILATERAL LOWER EXTREMITY EDEMA: Chronic | ICD-10-CM

## 2024-12-17 DIAGNOSIS — D50.8 OTHER IRON DEFICIENCY ANEMIA: Primary | ICD-10-CM

## 2024-12-17 DIAGNOSIS — I10 ESSENTIAL HYPERTENSION: ICD-10-CM

## 2024-12-17 DIAGNOSIS — C61 MALIGNANT NEOPLASM OF PROSTATE: Chronic | ICD-10-CM

## 2024-12-17 DIAGNOSIS — N18.32 TYPE 2 DIABETES MELLITUS WITH STAGE 3B CHRONIC KIDNEY DISEASE, WITHOUT LONG-TERM CURRENT USE OF INSULIN: Chronic | ICD-10-CM

## 2024-12-17 DIAGNOSIS — E11.22 TYPE 2 DIABETES MELLITUS WITH STAGE 3B CHRONIC KIDNEY DISEASE, WITHOUT LONG-TERM CURRENT USE OF INSULIN: Chronic | ICD-10-CM

## 2024-12-17 LAB
EST. AVERAGE GLUCOSE BLD GHB EST-MCNC: 108 MG/DL
HBA1C MFR BLD HPLC: 5.4 %
IRON SATN MFR SERPL: 58 % (ref 20–50)
IRON SERPL-MCNC: 163 UG/DL (ref 65–175)
PSA SERPL-MCNC: 0 NG/ML
TIBC SERPL-MCNC: 120 UG/DL (ref 69–240)
TIBC SERPL-MCNC: 283 UG/DL (ref 250–450)
TRANSFERRIN SERPL-MCNC: 250 MG/DL (ref 163–344)

## 2024-12-17 PROCEDURE — 83550 IRON BINDING TEST: CPT | Mod: ,,, | Performed by: CLINICAL MEDICAL LABORATORY

## 2024-12-17 PROCEDURE — 84153 ASSAY OF PSA TOTAL: CPT | Mod: ,,, | Performed by: CLINICAL MEDICAL LABORATORY

## 2024-12-17 PROCEDURE — 83036 HEMOGLOBIN GLYCOSYLATED A1C: CPT | Mod: ,,, | Performed by: CLINICAL MEDICAL LABORATORY

## 2024-12-17 PROCEDURE — 83540 ASSAY OF IRON: CPT | Mod: ,,, | Performed by: CLINICAL MEDICAL LABORATORY

## 2024-12-17 PROCEDURE — 99214 OFFICE O/P EST MOD 30 MIN: CPT | Mod: ,,, | Performed by: FAMILY MEDICINE

## 2024-12-17 RX ORDER — FERROUS SULFATE 325(65) MG
325 TABLET ORAL 2 TIMES DAILY
Qty: 180 TABLET | Refills: 1 | Status: SHIPPED | OUTPATIENT
Start: 2024-12-17

## 2024-12-17 RX ORDER — LOSARTAN POTASSIUM 100 MG/1
100 TABLET ORAL DAILY
Qty: 90 TABLET | Refills: 1 | Status: SHIPPED | OUTPATIENT
Start: 2024-12-17

## 2024-12-17 NOTE — LETTER
AUTHORIZATION FOR RELEASE OF   CONFIDENTIAL INFORMATION    Dear [facility],    We are seeing Theodore Begum, date of birth 1950, in the clinic at [facility]. Joseph Alex MD is the patient's PCP. Theodore Begum has an outstanding lab/procedure at the time we reviewed his chart. In order to help keep his health information updated, he has authorized us to request the following medical record(s):        (  )  MAMMOGRAM                                      (  )  COLONOSCOPY      (  )  PAP SMEAR                                          (  )  OUTSIDE LAB RESULTS     (  )  DEXA SCAN                                          ( X )  EYE EXAM            (  )  FOOT EXAM                                          (  )  ENTIRE RECORD     (  )  OUTSIDE IMMUNIZATIONS                 (  )  _______________         Please fax records to Joseph Alex MD, [phone]       If you have any questions, please contact Miya Way LPN at [phone].           Patient Name: Theodore Begum  : 1950  Patient Phone #: [phone]

## 2024-12-17 NOTE — TELEPHONE ENCOUNTER
----- Message from EMANI Olivas sent at 12/17/2024  8:20 AM CST -----  Please let patient know that kidney function remains stable and no changes are needed at this time. Thank you!

## 2024-12-17 NOTE — ASSESSMENT & PLAN NOTE
- Check glucose outside of clinic, record numbers, and bring log to follow up visit.    - Take medications as directed, and bring all medications to every clinic appointment.    - Eat a diabetic diet, if there are concerns about what that entails, we have a diabetic educator in our clinic.    - Cardiovascular exercise at least 3 times per week, for at least 15 minutes.    - Patient should be on a Statin medication, unless patient cannot tolerate a Statin.     - Recommend yearly, dilated eye exams for all Diabetic Patients.    - Monitor feet for calluses, abrasion, or other abnormalities. Report any concerns at every clinic visit.    -   Hemoglobin A1C   Date Value Ref Range Status   06/17/2024 5.3 4.5 - 6.6 % Final     Comment:       Normal:               <5.7%  Pre-Diabetic:       5.7% to 6.4%  Diabetic:             >6.4%  Diabetic Goal:     <7%   05/05/2023 5.8 4.5 - 6.6 % Final     Comment:       Normal:               <5.7%  Pre-Diabetic:       5.7% to 6.4%  Diabetic:             >6.4%  Diabetic Goal:     <7%   08/09/2022 6.9 (H) 4.2 - 6.0 % Final     Comment:     For evaluation of glycemic control in known diabetic non-pregnant adults:      A1C <7% lower or higher target A1C values maybe appropriate for individual patients.  For the diagnosis of diabetes mellitus: A1C >/=6.5%  Increased risk for diabetes mellitus:   A1C 5.7-6.4%   08/08/2022 6.5 (H) 4.2 - 6.0 % Final     Comment:     For evaluation of glycemic control in known diabetic non-pregnant adults:      A1C <7% lower or higher target A1C values maybe appropriate for individual patients.  For the diagnosis of diabetes mellitus: A1C >/=6.5%  Increased risk for diabetes mellitus:   A1C 5.7-6.4%

## 2024-12-17 NOTE — PROGRESS NOTES
Joseph Alex MD    [address] [place] [address]     PATIENT NAME: Theodore Begum  : 1950  DATE: 24  MRN: [id number]      Billing Provider: Joseph Alex MD  Level of Service: DC OFFICE/OUTPT VISIT, EST, LEVL IV, 30-39 MIN  Patient PCP Information       Provider PCP Type    Joseph Alex MD General            Reason for Visit / Chief Complaint: Follow-up (Follow up. Wants PSA checked. Dr. Suh stopped Lasix yesterday.) and Health Maintenance (Eye exam at Dr. Mitchell in [place] this year. Denies colonoscopy right now. )       Update PCP  Update Chief Complaint         History of Present Illness / Problem Focused Workflow     Theodore Begum presents to the clinic with Follow-up (Follow up. Wants PSA checked. Dr. Suh stopped Lasix yesterday.) and Health Maintenance (Eye exam at Dr. Mitchell in [place] this year. Denies colonoscopy right now. )     HPI    Review of Systems     Review of Systems   Constitutional:  Negative for activity change, appetite change, chills, fatigue and fever.   HENT:  Negative for nasal congestion, ear pain, hearing loss, postnasal drip and sore throat.    Respiratory:  Negative for cough, chest tightness, shortness of breath and wheezing.    Cardiovascular:  Negative for chest pain, palpitations, leg swelling and claudication.   Gastrointestinal:  Negative for abdominal pain, change in bowel habit, constipation, diarrhea, nausea and vomiting.   Genitourinary:  Negative for dysuria.   Musculoskeletal:  Negative for arthralgias, back pain, gait problem and myalgias.   Integumentary:  Negative for rash.   Neurological:  Negative for weakness and headaches.   Psychiatric/Behavioral:  Negative for suicidal ideas. The patient is not nervous/anxious.         Medical / Social / Family History     Past Medical History:   Diagnosis Date    A-fib     Anemia     Hyperlipidemia     Hypertension        Past Surgical History:    Procedure Laterality Date    ABDOMINAL AORTA STENT      CARDIOVERSION      CHOLECYSTECTOMY      HERNIA REPAIR      PROSTATE SURGERY      REPLACEMENT OF STENT         Social History    reports that he has been smoking cigarettes. He started smoking about 54 years ago. He has a 54.2 pack-year smoking history. He has been exposed to tobacco smoke. He has never used smokeless tobacco. He reports that he does not drink alcohol and does not use drugs.    Family History  's family history includes No Known Problems in his father and mother.    Medications and Allergies     Medications  Outpatient Medications Marked as Taking for the 12/17/24 encounter (Office Visit) with Joseph Alex MD   Medication Sig Dispense Refill    albuterol (PROVENTIL/VENTOLIN HFA) 90 mcg/actuation inhaler Inhale 2 puffs into the lungs every 6 (six) hours as needed for Wheezing. Rescue 18 g 5    amiodarone (PACERONE) 200 MG Tab Take 200 mg by mouth once daily.      apixaban (ELIQUIS) 5 mg Tab Take 1 tablet by mouth 2 (two) times daily.      aspirin (ECOTRIN) 81 MG EC tablet Take 81 mg by mouth once daily.      clobetasoL (TEMOVATE) 0.05 % external solution Apply to scalp BID tapering with improvement 50 mL 11    clotrimazole-betamethasone 1-0.05% (LOTRISONE) cream Apply a thin layer of cream to the penis twice daily as needed for RASH 45 g 2    FARXIGA 10 mg tablet Take 10 mg by mouth once daily.      hydrocortisone 2.5 % cream Apply a thin layer of cream to the rectum twice daily as needed for HEMORRHOIDS 56 g 2    metoprolol succinate (TOPROL-XL) 25 MG 24 hr tablet Take 25 mg by mouth once daily.      omeprazole (PRILOSEC) 20 MG capsule Take 1 capsule by mouth once daily.      oxybutynin (DITROPAN) 5 MG Tab Take 5 mg by mouth every evening.      spironolactone (ALDACTONE) 25 MG tablet Take 25 mg by mouth once daily.      [DISCONTINUED] FEROSUL 325 mg (65 mg iron) Tab tablet Take 325 mg by mouth 2 (two) times daily.       [DISCONTINUED] losartan (COZAAR) 100 MG tablet Take 1 tablet (100 mg total) by mouth once daily. For BLOOD PRESSURE 90 tablet 1       Allergies  Review of patient's allergies indicates:   Allergen Reactions    Moxifloxacin Shortness Of Breath    Cefaclor Other (See Comments)     Abdominal pain       Physical Examination     Vitals:    12/17/24 0837   BP: (!) 148/70   Pulse:    Resp:    Temp:      Physical Exam  Vitals and nursing note reviewed.   Constitutional:       General: He is not in acute distress.     Appearance: Normal appearance. He is not ill-appearing.   Eyes:      Extraocular Movements: Extraocular movements intact.      Pupils: Pupils are equal, round, and reactive to light.   Cardiovascular:      Rate and Rhythm: Normal rate and regular rhythm.   Pulmonary:      Effort: Pulmonary effort is normal.      Breath sounds: Normal breath sounds.   Skin:     General: Skin is warm.      Findings: No rash.   Neurological:      General: No focal deficit present.      Mental Status: He is alert and oriented to person, place, and time. Mental status is at baseline.   Psychiatric:         Mood and Affect: Mood normal.         Behavior: Behavior normal.            Assessment and Plan (including Health Maintenance)      Problem List  Smart Sets  Document Outside HM   :    Plan:     Iron deficiency anemia - recheck labs today    PSA today    He had labs yesterday, but there are a couple we need, will contact the lab to see if we can add it to the previous blood     Health Maintenance Due   Topic Date Due    Low Dose Statin  Never done    LDCT Lung Screen  Never done    RSV Vaccine (Age 60+ and Pregnant patients) (1 - Risk 60-74 years 1-dose series) Never done    Colorectal Cancer Screening  10/28/2019    Shingles Vaccine (2 of 2) 07/03/2024    Eye Exam  08/10/2024    COVID-19 Vaccine (6 - 2024-25 season) 09/01/2024    PROSTATE-SPECIFIC ANTIGEN  12/08/2024    Hemoglobin A1c  12/17/2024       Problem List Items  Addressed This Visit          Cardiac/Vascular    Essential hypertension (Chronic)    Overview      - Goal blood pressure for most patients is less than 130/85. Both numbers are important. If you have questions about your specific goal blood pressure, please ask at your clinic visits.   - Check blood pressure outside of clinic, record the numbers, and bring the log to all your office visits.   - If you have any chest pain, SOB, or other concerning symptoms, report these immediately, or go to the nearest ER.    - Recommend cardiovascular exercise, at least 3 times per week, for at least 15 minutes.   - Eat a heart healthy diet.            Relevant Medications    losartan (COZAAR) 100 MG tablet       Oncology    Malignant neoplasm of prostate (Chronic)    Relevant Orders    PSA, Total (Diagnostic)       Endocrine    Type 2 diabetes mellitus with stage 3b chronic kidney disease, without long-term current use of insulin (Chronic)    Current Assessment & Plan      - Check glucose outside of clinic, record numbers, and bring log to follow up visit.    - Take medications as directed, and bring all medications to every clinic appointment.    - Eat a diabetic diet, if there are concerns about what that entails, we have a diabetic educator in our clinic.    - Cardiovascular exercise at least 3 times per week, for at least 15 minutes.    - Patient should be on a Statin medication, unless patient cannot tolerate a Statin.     - Recommend yearly, dilated eye exams for all Diabetic Patients.    - Monitor feet for calluses, abrasion, or other abnormalities. Report any concerns at every clinic visit.    -   Hemoglobin A1C   Date Value Ref Range Status   06/17/2024 5.3 4.5 - 6.6 % Final     Comment:       Normal:               <5.7%  Pre-Diabetic:       5.7% to 6.4%  Diabetic:             >6.4%  Diabetic Goal:     <7%   05/05/2023 5.8 4.5 - 6.6 % Final     Comment:       Normal:               <5.7%  Pre-Diabetic:       5.7% to  6.4%  Diabetic:             >6.4%  Diabetic Goal:     <7%   08/09/2022 6.9 (H) 4.2 - 6.0 % Final     Comment:     For evaluation of glycemic control in known diabetic non-pregnant adults:      A1C <7% lower or higher target A1C values maybe appropriate for individual patients.  For the diagnosis of diabetes mellitus: A1C >/=6.5%  Increased risk for diabetes mellitus:   A1C 5.7-6.4%   08/08/2022 6.5 (H) 4.2 - 6.0 % Final     Comment:     For evaluation of glycemic control in known diabetic non-pregnant adults:      A1C <7% lower or higher target A1C values maybe appropriate for individual patients.  For the diagnosis of diabetes mellitus: A1C >/=6.5%  Increased risk for diabetes mellitus:   A1C 5.7-6.4%               Relevant Orders    Hemoglobin A1C       Other    Bilateral lower extremity edema (Chronic)     Other Visit Diagnoses       Other iron deficiency anemia    -  Primary    Relevant Medications    FEROSUL 325 mg (65 mg iron) Tab tablet    Other Relevant Orders    Iron and TIBC            Health Maintenance Topics with due status: Not Due       Topic Last Completion Date    TETANUS VACCINE 05/08/2024    Foot Exam 06/17/2024    Lipid Panel 10/04/2024    Diabetes Urine Screening 12/16/2024       Procedures     Future Appointments   Date Time Provider Department Center   3/4/2025  3:15 PM Vicky Colunga MD [facility] D[facility] [place]   4/16/2025  3:40 PM Mairela Martinez DO [facility] NE[facility] [place] [facility]   10/10/2025  2:00 PM AWV NURSE, [facility] [place]        Follow up in about 6 months (around 6/17/2025) for chronic health problems, diabetes, hypertension, kidney disease.     Signature:  Joseph Alex MD  [address] [place] [address]  Phone #: [phone]  Fax #: [phone]    Date of encounter: 12/17/24    There are no Patient Instructions on file for this visit.

## 2024-12-19 ENCOUNTER — PATIENT OUTREACH (OUTPATIENT)
Facility: HOSPITAL | Age: 74
End: 2024-12-19
Payer: MEDICARE

## 2024-12-19 NOTE — PROGRESS NOTES
Population Health Chart Review & Patient Outreach Details    Updates Requested / Reviewed:  [x]  Care Team Updated    Health Maintenance Topics Addressed and Outreach Outcomes / Actions Taken:  Diabetic Eye Exam [x] Not a patient at [facility]. Telephone Outreach to [facility]. Patient had exam August 2024. Records being faxed.

## 2025-04-16 ENCOUNTER — TELEPHONE (OUTPATIENT)
Dept: DERMATOLOGY | Facility: CLINIC | Age: 75
End: 2025-04-16
Payer: MEDICARE

## 2025-05-12 ENCOUNTER — OFFICE VISIT (OUTPATIENT)
Dept: FAMILY MEDICINE | Facility: CLINIC | Age: 75
End: 2025-05-12
Payer: MEDICARE

## 2025-05-12 VITALS
DIASTOLIC BLOOD PRESSURE: 76 MMHG | HEART RATE: 82 BPM | RESPIRATION RATE: 18 BRPM | TEMPERATURE: 98 F | SYSTOLIC BLOOD PRESSURE: 139 MMHG | HEIGHT: 72 IN | WEIGHT: 195 LBS | BODY MASS INDEX: 26.41 KG/M2 | OXYGEN SATURATION: 95 %

## 2025-05-12 DIAGNOSIS — N18.4 CKD (CHRONIC KIDNEY DISEASE) STAGE 4, GFR 15-29 ML/MIN: Chronic | ICD-10-CM

## 2025-05-12 DIAGNOSIS — I10 ESSENTIAL HYPERTENSION: Primary | Chronic | ICD-10-CM

## 2025-05-12 DIAGNOSIS — E78.2 MIXED HYPERLIPIDEMIA: Chronic | ICD-10-CM

## 2025-05-12 DIAGNOSIS — E11.22 TYPE 2 DIABETES MELLITUS WITH STAGE 4 CHRONIC KIDNEY DISEASE, WITHOUT LONG-TERM CURRENT USE OF INSULIN: Chronic | ICD-10-CM

## 2025-05-12 DIAGNOSIS — C61 MALIGNANT NEOPLASM OF PROSTATE: Chronic | ICD-10-CM

## 2025-05-12 DIAGNOSIS — I50.32 CHRONIC HEART FAILURE WITH PRESERVED EJECTION FRACTION (HFPEF): Chronic | ICD-10-CM

## 2025-05-12 DIAGNOSIS — J41.0 SIMPLE CHRONIC BRONCHITIS: Chronic | ICD-10-CM

## 2025-05-12 DIAGNOSIS — I48.91 ATRIAL FIBRILLATION WITH RVR: Chronic | ICD-10-CM

## 2025-05-12 DIAGNOSIS — N18.4 TYPE 2 DIABETES MELLITUS WITH STAGE 4 CHRONIC KIDNEY DISEASE, WITHOUT LONG-TERM CURRENT USE OF INSULIN: Chronic | ICD-10-CM

## 2025-05-12 DIAGNOSIS — N18.31 STAGE 3A CHRONIC KIDNEY DISEASE: Chronic | ICD-10-CM

## 2025-05-12 LAB
ALBUMIN SERPL BCP-MCNC: 3.1 G/DL (ref 3.4–4.8)
ALBUMIN/GLOB SERPL: 0.8 {RATIO}
ALP SERPL-CCNC: 98 U/L (ref 40–150)
ALT SERPL W P-5'-P-CCNC: 12 U/L
ANION GAP SERPL CALCULATED.3IONS-SCNC: 16 MMOL/L (ref 7–16)
AST SERPL W P-5'-P-CCNC: 20 U/L (ref 11–45)
BASOPHILS # BLD AUTO: 0.03 K/UL (ref 0–0.2)
BASOPHILS NFR BLD AUTO: 0.4 % (ref 0–1)
BILIRUB SERPL-MCNC: 0.6 MG/DL
BILIRUB UR QL STRIP: NEGATIVE
BUN SERPL-MCNC: 64 MG/DL (ref 8–26)
BUN/CREAT SERPL: 17 (ref 6–20)
CALCIUM SERPL-MCNC: 8.3 MG/DL (ref 8.8–10)
CHLORIDE SERPL-SCNC: 99 MMOL/L (ref 98–107)
CLARITY UR: CLEAR
CO2 SERPL-SCNC: 27 MMOL/L (ref 23–31)
COLOR UR: COLORLESS
CREAT SERPL-MCNC: 3.79 MG/DL (ref 0.72–1.25)
DIFFERENTIAL METHOD BLD: ABNORMAL
EGFR (NO RACE VARIABLE) (RUSH/TITUS): 16 ML/MIN/1.73M2
EOSINOPHIL # BLD AUTO: 0.12 K/UL (ref 0–0.5)
EOSINOPHIL NFR BLD AUTO: 1.7 % (ref 1–4)
ERYTHROCYTE [DISTWIDTH] IN BLOOD BY AUTOMATED COUNT: 14 % (ref 11.5–14.5)
GLOBULIN SER-MCNC: 3.7 G/DL (ref 2–4)
GLUCOSE SERPL-MCNC: 97 MG/DL (ref 82–115)
GLUCOSE UR STRIP-MCNC: 200 MG/DL
HCT VFR BLD AUTO: 40.8 % (ref 40–54)
HGB BLD-MCNC: 12.7 G/DL (ref 13.5–18)
HYALINE CASTS #/AREA URNS LPF: ABNORMAL /LPF
IMM GRANULOCYTES # BLD AUTO: 0.03 K/UL (ref 0–0.04)
IMM GRANULOCYTES NFR BLD: 0.4 % (ref 0–0.4)
KETONES UR STRIP-SCNC: NEGATIVE MG/DL
LEUKOCYTE ESTERASE UR QL STRIP: NEGATIVE
LYMPHOCYTES # BLD AUTO: 0.72 K/UL (ref 1–4.8)
LYMPHOCYTES NFR BLD AUTO: 10.3 % (ref 27–41)
MCH RBC QN AUTO: 29.9 PG (ref 27–31)
MCHC RBC AUTO-ENTMCNC: 31.1 G/DL (ref 32–36)
MCV RBC AUTO: 96 FL (ref 80–96)
MONOCYTES # BLD AUTO: 0.49 K/UL (ref 0–0.8)
MONOCYTES NFR BLD AUTO: 7 % (ref 2–6)
MPC BLD CALC-MCNC: 11.4 FL (ref 9.4–12.4)
MUCOUS, UA: ABNORMAL /LPF
NEUTROPHILS # BLD AUTO: 5.59 K/UL (ref 1.8–7.7)
NEUTROPHILS NFR BLD AUTO: 80.2 % (ref 53–65)
NITRITE UR QL STRIP: NEGATIVE
NRBC # BLD AUTO: 0 X10E3/UL
NRBC, AUTO (.00): 0 %
PH UR STRIP: 5.5 PH UNITS
PLATELET # BLD AUTO: 123 K/UL (ref 150–400)
POTASSIUM SERPL-SCNC: 4.1 MMOL/L (ref 3.5–5.1)
PROT SERPL-MCNC: 6.8 G/DL (ref 5.8–7.6)
PROT UR QL STRIP: NEGATIVE
RBC # BLD AUTO: 4.25 M/UL (ref 4.6–6.2)
RBC # UR STRIP: ABNORMAL /UL
RBC #/AREA URNS HPF: 22 /HPF
SODIUM SERPL-SCNC: 138 MMOL/L (ref 136–145)
SP GR UR STRIP: 1.01
UROBILINOGEN UR STRIP-ACNC: NORMAL MG/DL
WBC # BLD AUTO: 6.98 K/UL (ref 4.5–11)
WBC #/AREA URNS HPF: <1 /HPF

## 2025-05-12 PROCEDURE — 99214 OFFICE O/P EST MOD 30 MIN: CPT | Mod: ,,, | Performed by: FAMILY MEDICINE

## 2025-05-12 PROCEDURE — 80053 COMPREHEN METABOLIC PANEL: CPT | Mod: ,,, | Performed by: CLINICAL MEDICAL LABORATORY

## 2025-05-12 PROCEDURE — 81001 URINALYSIS AUTO W/SCOPE: CPT | Mod: ,,, | Performed by: CLINICAL MEDICAL LABORATORY

## 2025-05-12 PROCEDURE — 85025 COMPLETE CBC W/AUTO DIFF WBC: CPT | Mod: ,,, | Performed by: CLINICAL MEDICAL LABORATORY

## 2025-05-12 NOTE — PROGRESS NOTES
Joseph Alex MD    [facility]  [address] [place] [address]     PATIENT NAME: Theodore Begum  : 1950  DATE: 25  MRN: [id number]      Billing Provider: Joseph Alex MD  Level of Service: MI OFFICE/OUTPT VISIT, EST, LEVL IV, 30-39 MIN  Patient PCP Information       Provider PCP Type    Joseph Alex MD General                   Update PCP  Update Chief Complaint         History of Present Illness / Problem Focused Workflow     Theodore Begum presents to the clinic with   Chief Complaint   Patient presents with    Hypotension     BP was low over the weekend. Yesterday is was 97/45 and then later was 110/56. States he had a dull achy pain in his neck and down his back, states it goes across his shoulder when his bp was low      This has happened intermittently, but this past weekend was pretty bad.     Also had an episode of chills this past weekend         HPI    Review of Systems     Review of Systems   Constitutional:  Positive for fatigue. Negative for activity change, appetite change, chills and fever.   HENT:  Negative for nasal congestion, ear pain, hearing loss, postnasal drip and sore throat.    Respiratory:  Negative for cough, chest tightness, shortness of breath and wheezing.    Cardiovascular:  Negative for chest pain, palpitations, leg swelling and claudication.   Gastrointestinal:  Negative for abdominal pain, change in bowel habit, constipation, diarrhea, nausea and vomiting.   Genitourinary:  Negative for dysuria.   Musculoskeletal:  Negative for arthralgias, back pain, gait problem and myalgias.   Integumentary:  Negative for rash.   Neurological:  Positive for weakness. Negative for headaches.   Psychiatric/Behavioral:  Negative for suicidal ideas. The patient is not nervous/anxious.         Medical / Social / Family History     Past Medical History:   Diagnosis Date    A-fib     Anemia     Hyperlipidemia     Hypertension        Past  Surgical History:   Procedure Laterality Date    ABDOMINAL AORTA STENT      CARDIOVERSION      CHOLECYSTECTOMY      HERNIA REPAIR      PROSTATE SURGERY      REPLACEMENT OF STENT         Social History    reports that he has been smoking cigarettes. He started smoking about 54 years ago. He has a 54.6 pack-year smoking history. He has been exposed to tobacco smoke. He has never used smokeless tobacco. He reports that he does not drink alcohol and does not use drugs.    Family History  's family history includes No Known Problems in his father and mother.    Medications and Allergies     Medications  Outpatient Medications Marked as Taking for the 5/12/25 encounter (Office Visit) with Joseph Alex MD   Medication Sig Dispense Refill    albuterol (PROVENTIL/VENTOLIN HFA) 90 mcg/actuation inhaler Inhale 2 puffs into the lungs every 6 (six) hours as needed for Wheezing. Rescue 18 g 5    amiodarone (PACERONE) 200 MG Tab Take 200 mg by mouth once daily.      apixaban (ELIQUIS) 5 mg Tab Take 1 tablet by mouth 2 (two) times daily.      aspirin (ECOTRIN) 81 MG EC tablet Take 81 mg by mouth once daily.      clobetasoL (TEMOVATE) 0.05 % external solution Apply to scalp BID tapering with improvement 50 mL 11    clotrimazole (LOTRIMIN) 1 % Soln Apply 1 application  topically 2 (two) times daily.      clotrimazole-betamethasone 1-0.05% (LOTRISONE) cream Apply a thin layer of cream to the penis twice daily as needed for RASH 45 g 2    FARXIGA 10 mg tablet Take 10 mg by mouth once daily.      FEROSUL 325 mg (65 mg iron) Tab tablet Take 1 tablet (325 mg total) by mouth 2 (two) times daily. 180 tablet 1    hydrocortisone 2.5 % cream Apply a thin layer of cream to the rectum twice daily as needed for HEMORRHOIDS 56 g 2    metoprolol succinate (TOPROL-XL) 25 MG 24 hr tablet Take 25 mg by mouth once daily.      omeprazole (PRILOSEC) 20 MG capsule Take 1 capsule by mouth once daily.      oxybutynin (DITROPAN) 5 MG Tab Take 5  mg by mouth every evening.      pregabalin (LYRICA) 50 MG capsule Take 50 mg by mouth 3 (three) times daily.      spironolactone (ALDACTONE) 25 MG tablet Take 25 mg by mouth once daily.      [DISCONTINUED] losartan (COZAAR) 100 MG tablet Take 1 tablet (100 mg total) by mouth once daily. For BLOOD PRESSURE 90 tablet 1       Allergies  Review of patient's allergies indicates:   Allergen Reactions    Moxifloxacin Shortness Of Breath    Cefaclor Other (See Comments)     Abdominal pain       Physical Examination     Vitals:    05/12/25 1522   BP: 139/76   Pulse: 82   Resp: 18   Temp: 98 °F (36.7 °C)     Physical Exam  Vitals and nursing note reviewed.   Constitutional:       General: He is not in acute distress.     Appearance: Normal appearance. He is not ill-appearing.   Eyes:      Extraocular Movements: Extraocular movements intact.      Pupils: Pupils are equal, round, and reactive to light.   Cardiovascular:      Rate and Rhythm: Normal rate and regular rhythm.   Pulmonary:      Effort: Pulmonary effort is normal.      Breath sounds: Normal breath sounds.   Skin:     General: Skin is warm.      Findings: No rash.   Neurological:      General: No focal deficit present.      Mental Status: He is alert and oriented to person, place, and time. Mental status is at baseline.   Psychiatric:         Mood and Affect: Mood normal.         Behavior: Behavior normal.            Assessment and Plan (including Health Maintenance)      Problem List  Smart Sets  Document Outside HM   :    Plan:     Labs ordered today. Blood pressure is better today    Health Maintenance Due   Topic Date Due    Diabetic Eye Exam  Never done    Low Dose Statin  Never done    LDCT Lung Screen  Never done    RSV Vaccine (Age 60+ and Pregnant patients) (1 - Risk 60-74 years 1-dose series) Never done    Colorectal Cancer Screening  10/28/2019    Shingles Vaccine (2 of 2) 07/03/2024    COVID-19 Vaccine (6 - 2024-25 season) 09/01/2024    Foot Exam   06/17/2025       Problem List Items Addressed This Visit          Pulmonary    Simple chronic bronchitis (Chronic)       Cardiac/Vascular    Mixed hyperlipidemia (Chronic)    Essential hypertension - Primary (Chronic)    Overview    - Goal blood pressure for most patients is less than 130/85. Both numbers are important. If you have questions about your specific goal blood pressure, please ask at your clinic visits.   - Check blood pressure outside of clinic, record the numbers, and bring the log to all your office visits.   - If you have any chest pain, SOB, or other concerning symptoms, report these immediately, or go to the nearest ER.    - Recommend cardiovascular exercise, at least 3 times per week, for at least 15 minutes.   - Eat a heart healthy diet.            Relevant Orders    CBC Auto Differential (Completed)    Comprehensive Metabolic Panel (Completed)    Urinalysis, Reflex to Urine Culture (Completed)    Urinalysis, Microscopic (Completed)    Atrial fibrillation with RVR (Chronic)    Chronic heart failure with preserved ejection fraction (HFpEF) (Chronic)       Renal/    CKD (chronic kidney disease) stage 4, GFR 15-29 ml/min    RESOLVED: Stage 3a chronic kidney disease (Chronic)       Oncology    Malignant neoplasm of prostate (Chronic)       Endocrine    Type 2 diabetes mellitus with stage 4 chronic kidney disease, without long-term current use of insulin (Chronic)       Health Maintenance Topics with due status: Not Due       Topic Last Completion Date    TETANUS VACCINE 05/08/2024    Lipid Panel 10/04/2024    Diabetes Urine Screening 12/16/2024    PROSTATE-SPECIFIC ANTIGEN 12/17/2024    Hemoglobin A1c 12/17/2024       Procedures     Future Appointments   Date Time Provider Department Center   6/12/2025  3:45 PM Joseph Alex MD [facility] [place]   10/10/2025  2:00 PM KELVIN NURSE, [facility] [place]        Follow up in about 1 month (around 6/12/2025),  or if symptoms worsen or fail to improve, for chronic health problems, hypertension.     Signature:  Joseph Alex MD  [address] [place] [address]  Phone #: [phone]  Fax #: [phone]    Date of encounter: 5/12/25    There are no Patient Instructions on file for this visit.

## 2025-05-13 ENCOUNTER — RESULTS FOLLOW-UP (OUTPATIENT)
Dept: FAMILY MEDICINE | Facility: CLINIC | Age: 75
End: 2025-05-13

## 2025-05-13 PROBLEM — N18.4 CKD (CHRONIC KIDNEY DISEASE) STAGE 4, GFR 15-29 ML/MIN: Status: ACTIVE | Noted: 2025-05-13

## 2025-05-13 PROBLEM — N18.31 STAGE 3A CHRONIC KIDNEY DISEASE: Chronic | Status: RESOLVED | Noted: 2023-05-05 | Resolved: 2025-05-13

## 2025-05-13 PROBLEM — N18.4 TYPE 2 DIABETES MELLITUS WITH STAGE 4 CHRONIC KIDNEY DISEASE, WITHOUT LONG-TERM CURRENT USE OF INSULIN: Chronic | Status: ACTIVE | Noted: 2024-01-29

## 2025-05-13 PROBLEM — L97.518 DIABETIC ULCER OF TOE OF RIGHT FOOT ASSOCIATED WITH TYPE 2 DIABETES MELLITUS, WITH OTHER ULCER SEVERITY: Status: ACTIVE | Noted: 2025-05-13

## 2025-05-13 PROBLEM — E11.621 DIABETIC ULCER OF TOE OF RIGHT FOOT ASSOCIATED WITH TYPE 2 DIABETES MELLITUS, WITH OTHER ULCER SEVERITY: Status: ACTIVE | Noted: 2025-05-13

## 2025-05-17 ENCOUNTER — OFFICE VISIT (OUTPATIENT)
Dept: FAMILY MEDICINE | Facility: CLINIC | Age: 75
End: 2025-05-17
Payer: MEDICARE

## 2025-05-17 VITALS
DIASTOLIC BLOOD PRESSURE: 80 MMHG | SYSTOLIC BLOOD PRESSURE: 139 MMHG | WEIGHT: 190 LBS | OXYGEN SATURATION: 96 % | RESPIRATION RATE: 18 BRPM | TEMPERATURE: 98 F | HEIGHT: 72 IN | BODY MASS INDEX: 25.73 KG/M2 | HEART RATE: 81 BPM

## 2025-05-17 DIAGNOSIS — E11.22 TYPE 2 DIABETES MELLITUS WITH STAGE 4 CHRONIC KIDNEY DISEASE, WITHOUT LONG-TERM CURRENT USE OF INSULIN: ICD-10-CM

## 2025-05-17 DIAGNOSIS — N18.4 TYPE 2 DIABETES MELLITUS WITH STAGE 4 CHRONIC KIDNEY DISEASE, WITHOUT LONG-TERM CURRENT USE OF INSULIN: ICD-10-CM

## 2025-05-17 DIAGNOSIS — I95.89 OTHER SPECIFIED HYPOTENSION: ICD-10-CM

## 2025-05-17 DIAGNOSIS — R05.9 COUGH, UNSPECIFIED TYPE: ICD-10-CM

## 2025-05-17 DIAGNOSIS — J44.1 COPD WITH ACUTE EXACERBATION: Primary | ICD-10-CM

## 2025-05-17 LAB
CTP QC/QA: YES
GLUCOSE SERPL-MCNC: 88 MG/DL (ref 70–110)
SARS-COV-2 RDRP RESP QL NAA+PROBE: NEGATIVE

## 2025-05-17 PROCEDURE — 96372 THER/PROPH/DIAG INJ SC/IM: CPT | Mod: ,,, | Performed by: NURSE PRACTITIONER

## 2025-05-17 PROCEDURE — 82962 GLUCOSE BLOOD TEST: CPT | Mod: RHCUB | Performed by: NURSE PRACTITIONER

## 2025-05-17 PROCEDURE — 99214 OFFICE O/P EST MOD 30 MIN: CPT | Mod: ,,, | Performed by: NURSE PRACTITIONER

## 2025-05-17 PROCEDURE — 87635 SARS-COV-2 COVID-19 AMP PRB: CPT | Mod: RHCUB | Performed by: NURSE PRACTITIONER

## 2025-05-17 RX ORDER — DEXAMETHASONE SODIUM PHOSPHATE 4 MG/ML
8 INJECTION, SOLUTION INTRA-ARTICULAR; INTRALESIONAL; INTRAMUSCULAR; INTRAVENOUS; SOFT TISSUE
Status: COMPLETED | OUTPATIENT
Start: 2025-05-17 | End: 2025-05-17

## 2025-05-17 RX ORDER — CEFTRIAXONE 1 G/1
1 INJECTION, POWDER, FOR SOLUTION INTRAMUSCULAR; INTRAVENOUS
Status: COMPLETED | OUTPATIENT
Start: 2025-05-17 | End: 2025-05-17

## 2025-05-17 RX ORDER — PREDNISONE 20 MG/1
40 TABLET ORAL DAILY
Qty: 10 TABLET | Refills: 0 | Status: SHIPPED | OUTPATIENT
Start: 2025-05-17 | End: 2025-05-22

## 2025-05-17 RX ORDER — AZITHROMYCIN 250 MG/1
TABLET, FILM COATED ORAL
Qty: 6 TABLET | Refills: 0 | Status: SHIPPED | OUTPATIENT
Start: 2025-05-17

## 2025-05-17 RX ADMIN — DEXAMETHASONE SODIUM PHOSPHATE 8 MG: 4 INJECTION, SOLUTION INTRA-ARTICULAR; INTRALESIONAL; INTRAMUSCULAR; INTRAVENOUS; SOFT TISSUE at 09:05

## 2025-05-17 RX ADMIN — CEFTRIAXONE 1 G: 1 INJECTION, POWDER, FOR SOLUTION INTRAMUSCULAR; INTRAVENOUS at 09:05

## 2025-05-17 NOTE — PROGRESS NOTES
Jailyn Mulligan DNP, EMANI    [address] [place] [address]     PATIENT NAME: Theodore Begum  : 1950  DATE: 25  MRN: [id number]      Billing Provider: Jailyn Mulligan DNP, EMANI  Level of Service:   Patient PCP Information       Provider PCP Type    Joseph Alex MD General            Reason for Visit / Chief Complaint: Hypotension (States his bp has been low, was 97/45 a few days ago. Dr. Alex told him to half his losartan on . States that was not helping so he hasn't taken it at all in 4 days States he does not have any strength, states he is coughing and blowing his nose)       Update PCP  Update Chief Complaint         History of Present Illness / Problem Focused Workflow     Theodore Begum presents to the clinic with Hypotension (States his bp has been low, was 97/45 a few days ago. Dr. Alex told him to half his losartan on . States that was not helping so he hasn't taken it at all in 4 days States he does not have any strength, states he is coughing and blowing his nose)     Continues to smoke approx 1 ppd.     Review of Systems     Review of Systems   Constitutional:  Positive for fatigue. Negative for activity change and appetite change.   HENT:  Negative for dental problem, ear pain, sinus pressure/congestion and sore throat.    Respiratory:  Positive for cough, shortness of breath and wheezing. Negative for chest tightness.    Cardiovascular:  Negative for chest pain and palpitations.   Gastrointestinal:  Negative for abdominal pain.   Genitourinary:  Negative for bladder incontinence and dysuria.   Musculoskeletal:  Negative for arthralgias.   Integumentary:  Negative for rash.   Neurological:  Negative for dizziness, weakness and numbness.        Medical / Social / Family History     Past Medical History:   Diagnosis Date    A-fib     Anemia     Hyperlipidemia     Hypertension        Past Surgical History:   Procedure Laterality Date     ABDOMINAL AORTA STENT      CARDIOVERSION      CHOLECYSTECTOMY      HERNIA REPAIR      PROSTATE SURGERY      REPLACEMENT OF STENT         Social History  Mr. Theodore Begum  reports that he has been smoking cigarettes. He started smoking about 54 years ago. He has a 54.6 pack-year smoking history. He has been exposed to tobacco smoke. He has never used smokeless tobacco. He reports that he does not drink alcohol and does not use drugs.    Family History  Mr. Theodore Begum's family history includes No Known Problems in his father and mother.    Medications and Allergies     Medications  Outpatient Medications Marked as Taking for the 5/17/25 encounter (Office Visit) with Jailyn Mulligan, MARCOS, FNP   Medication Sig Dispense Refill    albuterol (PROVENTIL/VENTOLIN HFA) 90 mcg/actuation inhaler Inhale 2 puffs into the lungs every 6 (six) hours as needed for Wheezing. Rescue 18 g 5    amiodarone (PACERONE) 200 MG Tab Take 200 mg by mouth once daily.      apixaban (ELIQUIS) 5 mg Tab Take 1 tablet by mouth 2 (two) times daily.      aspirin (ECOTRIN) 81 MG EC tablet Take 81 mg by mouth once daily.      clobetasoL (TEMOVATE) 0.05 % external solution Apply to scalp BID tapering with improvement 50 mL 11    clotrimazole (LOTRIMIN) 1 % Soln Apply 1 application  topically 2 (two) times daily.      clotrimazole-betamethasone 1-0.05% (LOTRISONE) cream Apply a thin layer of cream to the penis twice daily as needed for RASH 45 g 2    FARXIGA 10 mg tablet Take 10 mg by mouth once daily.      FEROSUL 325 mg (65 mg iron) Tab tablet Take 1 tablet (325 mg total) by mouth 2 (two) times daily. 180 tablet 1    hydrocortisone 2.5 % cream Apply a thin layer of cream to the rectum twice daily as needed for HEMORRHOIDS 56 g 2    metoprolol succinate (TOPROL-XL) 25 MG 24 hr tablet Take 25 mg by mouth once daily.      omeprazole (PRILOSEC) 20 MG capsule Take 1 capsule by mouth once daily.      oxybutynin (DITROPAN) 5 MG Tab Take 5 mg by mouth every  evening.      pregabalin (LYRICA) 50 MG capsule Take 50 mg by mouth 3 (three) times daily.      spironolactone (ALDACTONE) 25 MG tablet Take 25 mg by mouth once daily.       Current Facility-Administered Medications for the 5/17/25 encounter (Office Visit) with Jailyn Mulligan DNP, FNP   Medication Dose Route Frequency Provider Last Rate Last Admin    [COMPLETED] cefTRIAXone injection 1 g  1 g Intramuscular 1 time in Clinic/HOD Jailyn Mulligan DNP, FNP   1 g at 05/17/25 0924    [COMPLETED] dexAMETHasone injection 8 mg  8 mg Intramuscular 1 time in Clinic/HOD Jailyn Mulligan DNP, FNP   8 mg at 05/17/25 0924       Allergies  Review of patient's allergies indicates:   Allergen Reactions    Moxifloxacin Shortness Of Breath    Cefaclor Other (See Comments)     Abdominal pain       Physical Examination     Vitals:    05/17/25 0850 05/17/25 0903 05/17/25 0904 05/17/25 0905   BP: (!) 144/67 126/68  Comment: orthostatic (!) 145/78  Comment: orthostatic 139/80  Comment: orthostatic   BP Location: Left arm Left arm Left arm Left arm   Patient Position: Sitting Lying Sitting Standing   Pulse: 60 61 81 81   Resp: 18      Temp: 98 °F (36.7 °C)      TempSrc: Oral      SpO2: 96%      Weight: 86.2 kg (190 lb)      Height: 6' (1.829 m)        Physical Exam  Vitals and nursing note reviewed.   Constitutional:       General: He is not in acute distress.     Appearance: Normal appearance. He is normal weight.   HENT:      Mouth/Throat:      Mouth: Mucous membranes are moist.   Eyes:      Pupils: Pupils are equal, round, and reactive to light.   Cardiovascular:      Rate and Rhythm: Normal rate and regular rhythm.      Pulses: Normal pulses.      Heart sounds: No murmur heard.  Pulmonary:      Effort: Pulmonary effort is normal. No respiratory distress.      Breath sounds: Wheezing and rhonchi present. No rales.      Comments: Right middle lung and lower lung fields. Does not improve with cough  Chest:      Chest wall: No tenderness.    Abdominal:      General: Bowel sounds are normal. There is no distension.      Palpations: Abdomen is soft.      Tenderness: There is no abdominal tenderness. There is no right CVA tenderness, left CVA tenderness, guarding or rebound.   Musculoskeletal:         General: No swelling or tenderness. Normal range of motion.      Cervical back: Normal range of motion and neck supple.      Right lower leg: No edema.      Left lower leg: No edema.   Skin:     General: Skin is warm and dry.   Neurological:      General: No focal deficit present.      Mental Status: He is alert and oriented to person, place, and time.   Psychiatric:         Mood and Affect: Mood normal.         Behavior: Behavior normal.         Thought Content: Thought content normal.         Judgment: Judgment normal.          Assessment and Plan (including Health Maintenance)      Problem List  Smart Sets  Document Outside HM   :    Plan:         Health Maintenance Due   Topic Date Due    Diabetic Eye Exam  Never done    Low Dose Statin  Never done    LDCT Lung Screen  Never done    RSV Vaccine (Age 60+ and Pregnant patients) (1 - Risk 60-74 years 1-dose series) Never done    Colorectal Cancer Screening  10/28/2019    COVID-19 Vaccine (9 - 2024-25 season) 12/04/2024    Foot Exam  06/17/2025    Hemoglobin A1c  06/17/2025       Problem List Items Addressed This Visit          Pulmonary    COPD with acute exacerbation - Primary (Chronic)    Relevant Medications    cefTRIAXone injection 1 g (Completed)    dexAMETHasone injection 8 mg (Completed)    azithromycin (ZITHROMAX Z-JOSE) 250 MG tablet    predniSONE (DELTASONE) 20 MG tablet       Endocrine    Type 2 diabetes mellitus with stage 4 chronic kidney disease, without long-term current use of insulin (Chronic)    Relevant Orders    POCT Glucose, Hand-Held Device (Completed)     Other Visit Diagnoses         Cough, unspecified type        Relevant Orders    POCT COVID-19 Rapid Screening (Completed)       Other specified hypotension              COPD with acute exacerbation  -     cefTRIAXone injection 1 g  -     dexAMETHasone injection 8 mg  -     azithromycin (ZITHROMAX Z-JOSE) 250 MG tablet; Take 2 tablets on Day 1 and 1 tablet daily for next 4 days.  Dispense: 6 tablet; Refill: 0  -     predniSONE (DELTASONE) 20 MG tablet; Take 2 tablets (40 mg total) by mouth once daily. for 5 days  Dispense: 10 tablet; Refill: 0    Cough, unspecified type  -     POCT COVID-19 Rapid Screening    Type 2 diabetes mellitus with stage 4 chronic kidney disease, without long-term current use of insulin  -     POCT Glucose, Hand-Held Device    Other specified hypotension       Health Maintenance Topics with due status: Not Due       Topic Last Completion Date    TETANUS VACCINE 05/08/2024    Lipid Panel 10/04/2024    Diabetes Urine Screening 12/16/2024    PROSTATE-SPECIFIC ANTIGEN 12/17/2024         Future Appointments   Date Time Provider Department Center   5/17/2025 10:00 AM Jailyn Mulligan DNP, FNP [facility] [place]   6/12/2025  3:45 PM Joseph Alex MD [facility] [place]   10/10/2025  2:00 PM AW[facility] [place]        Follow up if symptoms worsen or fail to improve.     Signature:  Jailyn Mulligan DNP, FNP  [address] [place] [address]  Phone #: [phone]  Fax #: [phone]    Date of encounter: 5/17/25    Patient Instructions   Increase fluid intake. Take meds as prescribed. Follow up if no improvement in 5-7 days.  Hold Losartan and monitor blood pressure. Notify provider if BP consistently > 140/90 off of meds. Keep follow up appointment with Dr. Alex.

## 2025-05-17 NOTE — PATIENT INSTRUCTIONS
Increase fluid intake. Take meds as prescribed. Follow up if no improvement in 5-7 days.  Hold Losartan and monitor blood pressure. Notify provider if BP consistently > 140/90 off of meds. Keep follow up appointment with Dr. Alex.

## 2025-05-20 ENCOUNTER — OFFICE VISIT (OUTPATIENT)
Dept: FAMILY MEDICINE | Facility: CLINIC | Age: 75
End: 2025-05-20
Payer: MEDICARE

## 2025-05-20 ENCOUNTER — HOSPITAL ENCOUNTER (OUTPATIENT)
Dept: RADIOLOGY | Facility: HOSPITAL | Age: 75
Discharge: HOME | End: 2025-05-20
Attending: FAMILY MEDICINE
Payer: MEDICARE

## 2025-05-20 VITALS
DIASTOLIC BLOOD PRESSURE: 81 MMHG | WEIGHT: 190 LBS | SYSTOLIC BLOOD PRESSURE: 163 MMHG | HEIGHT: 72 IN | BODY MASS INDEX: 25.73 KG/M2 | TEMPERATURE: 98 F | HEART RATE: 59 BPM

## 2025-05-20 DIAGNOSIS — N18.4 TYPE 2 DIABETES MELLITUS WITH STAGE 4 CHRONIC KIDNEY DISEASE, WITHOUT LONG-TERM CURRENT USE OF INSULIN: Chronic | ICD-10-CM

## 2025-05-20 DIAGNOSIS — R05.2 SUBACUTE COUGH: Primary | ICD-10-CM

## 2025-05-20 DIAGNOSIS — R06.2 WHEEZE: ICD-10-CM

## 2025-05-20 DIAGNOSIS — R05.2 SUBACUTE COUGH: ICD-10-CM

## 2025-05-20 DIAGNOSIS — R06.02 SOB (SHORTNESS OF BREATH): ICD-10-CM

## 2025-05-20 DIAGNOSIS — E11.22 TYPE 2 DIABETES MELLITUS WITH STAGE 4 CHRONIC KIDNEY DISEASE, WITHOUT LONG-TERM CURRENT USE OF INSULIN: Chronic | ICD-10-CM

## 2025-05-20 DIAGNOSIS — J44.1 COPD WITH ACUTE EXACERBATION: Chronic | ICD-10-CM

## 2025-05-20 PROCEDURE — 71046 X-RAY EXAM CHEST 2 VIEWS: CPT | Mod: TC,PN

## 2025-05-20 PROCEDURE — 94640 AIRWAY INHALATION TREATMENT: CPT | Mod: ,,, | Performed by: FAMILY MEDICINE

## 2025-05-20 PROCEDURE — 99214 OFFICE O/P EST MOD 30 MIN: CPT | Mod: ,,, | Performed by: FAMILY MEDICINE

## 2025-05-20 PROCEDURE — 71046 X-RAY EXAM CHEST 2 VIEWS: CPT | Mod: 26,,, | Performed by: RADIOLOGY

## 2025-05-20 RX ORDER — PREDNISONE 10 MG/1
TABLET ORAL
Qty: 17 TABLET | Refills: 0 | Status: SHIPPED | OUTPATIENT
Start: 2025-05-20 | End: 2025-06-01

## 2025-05-20 RX ORDER — IPRATROPIUM BROMIDE AND ALBUTEROL SULFATE 2.5; .5 MG/3ML; MG/3ML
3 SOLUTION RESPIRATORY (INHALATION)
Status: COMPLETED | OUTPATIENT
Start: 2025-05-20 | End: 2025-05-20

## 2025-05-20 RX ADMIN — IPRATROPIUM BROMIDE AND ALBUTEROL SULFATE 3 ML: 2.5; .5 SOLUTION RESPIRATORY (INHALATION) at 02:05

## 2025-05-20 NOTE — PROGRESS NOTES
Joseph Alex MD    [address] [place] [address]     PATIENT NAME: Theodore Begum  : 1950  DATE: 25  MRN: [id number]      Billing Provider: Josehp Alex MD  Level of Service: IA OFFICE/OUTPT VISIT, EST, LEVL IV, 30-39 MIN  Patient PCP Information       Provider PCP Type    Joseph Alex MD General                   Update PCP  Update Chief Complaint         History of Present Illness / Problem Focused Workflow     Theodore Begum presents to the clinic with   Chief Complaint   Patient presents with    Cough     Saw Jailyn Saturday. Coughing and would like a chest x ray      Also was ordered to have an ECG by Cardiology, that was obtained in clinic and definitely has some abnormalities    Symptoms started 4 days ago, he is short of breath, cough is deep and wet, but struggles to cough hard enough to get the stuff up. He is wheezing, only using albuterol a couple of times per day    Also reports severe weakness, full body    HPI    Review of Systems     Review of Systems   Constitutional:  Negative for activity change, appetite change, chills, fatigue and fever.   HENT:  Negative for nasal congestion, ear pain, hearing loss, postnasal drip and sore throat.    Respiratory:  Positive for cough, shortness of breath and wheezing. Negative for chest tightness.    Cardiovascular:  Positive for chest pain. Negative for palpitations, leg swelling and claudication.   Gastrointestinal:  Negative for abdominal pain, change in bowel habit, constipation, diarrhea, nausea and vomiting.   Genitourinary:  Negative for dysuria.   Musculoskeletal:  Negative for arthralgias, back pain, gait problem and myalgias.   Integumentary:  Negative for rash.   Neurological:  Negative for weakness and headaches.   Psychiatric/Behavioral:  Negative for suicidal ideas. The patient is not nervous/anxious.         Medical / Social / Family History     Past Medical History:    Diagnosis Date    A-fib     Anemia     Hyperlipidemia     Hypertension        Past Surgical History:   Procedure Laterality Date    ABDOMINAL AORTA STENT      CARDIOVERSION      CHOLECYSTECTOMY      HERNIA REPAIR      PROSTATE SURGERY      REPLACEMENT OF STENT         Social History    reports that he has been smoking cigarettes. He started smoking about 54 years ago. He has a 54.6 pack-year smoking history. He has been exposed to tobacco smoke. He has never used smokeless tobacco. He reports that he does not drink alcohol and does not use drugs.    Family History  's family history includes No Known Problems in his father and mother.    Medications and Allergies     Medications  Outpatient Medications Marked as Taking for the 5/20/25 encounter (Office Visit) with Joseph Alex MD   Medication Sig Dispense Refill    albuterol (PROVENTIL/VENTOLIN HFA) 90 mcg/actuation inhaler Inhale 2 puffs into the lungs every 6 (six) hours as needed for Wheezing. Rescue 18 g 5    amiodarone (PACERONE) 200 MG Tab Take 200 mg by mouth once daily.      apixaban (ELIQUIS) 5 mg Tab Take 1 tablet by mouth 2 (two) times daily.      aspirin (ECOTRIN) 81 MG EC tablet Take 81 mg by mouth once daily.      azithromycin (ZITHROMAX Z-JOSE) 250 MG tablet Take 2 tablets on Day 1 and 1 tablet daily for next 4 days. 6 tablet 0    clobetasoL (TEMOVATE) 0.05 % external solution Apply to scalp BID tapering with improvement 50 mL 11    clotrimazole (LOTRIMIN) 1 % Soln Apply 1 application  topically 2 (two) times daily.      clotrimazole-betamethasone 1-0.05% (LOTRISONE) cream Apply a thin layer of cream to the penis twice daily as needed for RASH 45 g 2    FARXIGA 10 mg tablet Take 10 mg by mouth once daily.      FEROSUL 325 mg (65 mg iron) Tab tablet Take 1 tablet (325 mg total) by mouth 2 (two) times daily. 180 tablet 1    hydrocortisone 2.5 % cream Apply a thin layer of cream to the rectum twice daily as needed for HEMORRHOIDS 56 g 2     metoprolol succinate (TOPROL-XL) 25 MG 24 hr tablet Take 25 mg by mouth once daily.      omeprazole (PRILOSEC) 20 MG capsule Take 1 capsule by mouth once daily.      oxybutynin (DITROPAN) 5 MG Tab Take 5 mg by mouth every evening.      predniSONE (DELTASONE) 20 MG tablet Take 2 tablets (40 mg total) by mouth once daily. for 5 days 10 tablet 0    pregabalin (LYRICA) 50 MG capsule Take 50 mg by mouth 3 (three) times daily.      spironolactone (ALDACTONE) 25 MG tablet Take 25 mg by mouth once daily.       Current Facility-Administered Medications for the 5/20/25 encounter (Office Visit) with Joseph Alex MD   Medication Dose Route Frequency Provider Last Rate Last Admin    [COMPLETED] albuterol-ipratropium 2.5 mg-0.5 mg/3 mL nebulizer solution 3 mL  3 mL Nebulization 1 time in Clinic/HOD Joseph Alex MD   3 mL at 05/20/25 1428       Allergies  Review of patient's allergies indicates:   Allergen Reactions    Moxifloxacin Shortness Of Breath    Cefaclor Other (See Comments)     Abdominal pain       Physical Examination     Vitals:    05/20/25 1332   BP: (!) 163/81   Pulse: (!) 59   Temp: 98.4 °F (36.9 °C)     Physical Exam  Vitals and nursing note reviewed.   Constitutional:       General: He is not in acute distress.     Appearance: Normal appearance. He is not ill-appearing.   Eyes:      Extraocular Movements: Extraocular movements intact.      Pupils: Pupils are equal, round, and reactive to light.   Cardiovascular:      Rate and Rhythm: Normal rate and regular rhythm.   Pulmonary:      Effort: Pulmonary effort is normal.      Breath sounds: Wheezing present.   Skin:     General: Skin is warm.      Findings: No rash.   Neurological:      General: No focal deficit present.      Mental Status: He is alert and oriented to person, place, and time. Mental status is at baseline.   Psychiatric:         Mood and Affect: Mood normal.         Behavior: Behavior normal.            Assessment and Plan (including  Health Maintenance)      Problem List  Smart Sets  Document Outside HM   :    Plan:     CXR is stable    EKG is similar to previous, definitely some abnormalities    Duoneb in clinic, suspect a COPD exacerbation        Health Maintenance Due   Topic Date Due    Diabetic Eye Exam  Never done    Low Dose Statin  Never done    LDCT Lung Screen  Never done    RSV Vaccine (Age 60+ and Pregnant patients) (1 - Risk 60-74 years 1-dose series) Never done    Colorectal Cancer Screening  10/28/2019    COVID-19 Vaccine (9 - 2024-25 season) 12/04/2024    Foot Exam  06/17/2025    Hemoglobin A1c  06/17/2025       Problem List Items Addressed This Visit          Pulmonary    COPD with acute exacerbation (Chronic)    Relevant Medications    predniSONE (DELTASONE) 10 MG tablet       Endocrine    Type 2 diabetes mellitus with stage 4 chronic kidney disease, without long-term current use of insulin (Chronic)    Current Assessment & Plan    - Check glucose outside of clinic, record numbers, and bring log to follow up visit.    - Take medications as directed, and bring all medications to every clinic appointment.    - Eat a diabetic diet, if there are concerns about what that entails, we have a diabetic educator in our clinic.    - Cardiovascular exercise at least 3 times per week, for at least 15 minutes.    - Patient should be on a Statin medication, unless patient cannot tolerate a Statin.     - Recommend yearly, dilated eye exams for all Diabetic Patients.    - Monitor feet for calluses, abrasion, or other abnormalities. Report any concerns at every clinic visit.    -   Hemoglobin A1C   Date Value Ref Range Status   12/17/2024 5.4 <=7.0 % Final     Comment:       Normal:               <5.7%  Pre-Diabetic:       5.7% to 6.4%  Diabetic:             >6.4%  Diabetic Goal:     <7%   06/17/2024 5.3 4.5 - 6.6 % Final     Comment:       Normal:               <5.7%  Pre-Diabetic:       5.7% to 6.4%  Diabetic:             >6.4%  Diabetic Goal:      <7%   05/05/2023 5.8 4.5 - 6.6 % Final     Comment:       Normal:               <5.7%  Pre-Diabetic:       5.7% to 6.4%  Diabetic:             >6.4%  Diabetic Goal:     <7%   08/09/2022 6.9 (H) 4.2 - 6.0 % Final     Comment:     For evaluation of glycemic control in known diabetic non-pregnant adults:      A1C <7% lower or higher target A1C values maybe appropriate for individual patients.  For the diagnosis of diabetes mellitus: A1C >/=6.5%  Increased risk for diabetes mellitus:   A1C 5.7-6.4%   08/08/2022 6.5 (H) 4.2 - 6.0 % Final     Comment:     For evaluation of glycemic control in known diabetic non-pregnant adults:      A1C <7% lower or higher target A1C values maybe appropriate for individual patients.  For the diagnosis of diabetes mellitus: A1C >/=6.5%  Increased risk for diabetes mellitus:   A1C 5.7-6.4%                Other Visit Diagnoses         Subacute cough    -  Primary    Relevant Orders    X-Ray Chest PA And Lateral      Wheeze        Relevant Medications    albuterol-ipratropium 2.5 mg-0.5 mg/3 mL nebulizer solution 3 mL (Completed)    Other Relevant Orders    X-Ray Chest PA And Lateral      SOB (shortness of breath)        Relevant Medications    albuterol-ipratropium 2.5 mg-0.5 mg/3 mL nebulizer solution 3 mL (Completed)    Other Relevant Orders    X-Ray Chest PA And Lateral            Health Maintenance Topics with due status: Not Due       Topic Last Completion Date    TETANUS VACCINE 05/08/2024    Lipid Panel 10/04/2024    Diabetes Urine Screening 12/16/2024    PROSTATE-SPECIFIC ANTIGEN 12/17/2024       Procedures     Future Appointments   Date Time Provider Department Center   5/20/2025  3:45 PM Joseph Alex MD [facility] [place]   6/12/2025  3:45 PM Joseph Alex MD [facility] [place]   10/10/2025  2:00 PM AWV NURSE, [facility] [place]        No follow-ups on file.     Signature:  Joseph Alex MD  [facility]  [address] [place] [address]  Phone #: [phone]  Fax #: [phone]    Date of encounter: 5/20/25    There are no Patient Instructions on file for this visit.

## 2025-05-20 NOTE — ASSESSMENT & PLAN NOTE
- Check glucose outside of clinic, record numbers, and bring log to follow up visit.    - Take medications as directed, and bring all medications to every clinic appointment.    - Eat a diabetic diet, if there are concerns about what that entails, we have a diabetic educator in our clinic.    - Cardiovascular exercise at least 3 times per week, for at least 15 minutes.    - Patient should be on a Statin medication, unless patient cannot tolerate a Statin.     - Recommend yearly, dilated eye exams for all Diabetic Patients.    - Monitor feet for calluses, abrasion, or other abnormalities. Report any concerns at every clinic visit.    -   Hemoglobin A1C   Date Value Ref Range Status   12/17/2024 5.4 <=7.0 % Final     Comment:       Normal:               <5.7%  Pre-Diabetic:       5.7% to 6.4%  Diabetic:             >6.4%  Diabetic Goal:     <7%   06/17/2024 5.3 4.5 - 6.6 % Final     Comment:       Normal:               <5.7%  Pre-Diabetic:       5.7% to 6.4%  Diabetic:             >6.4%  Diabetic Goal:     <7%   05/05/2023 5.8 4.5 - 6.6 % Final     Comment:       Normal:               <5.7%  Pre-Diabetic:       5.7% to 6.4%  Diabetic:             >6.4%  Diabetic Goal:     <7%   08/09/2022 6.9 (H) 4.2 - 6.0 % Final     Comment:     For evaluation of glycemic control in known diabetic non-pregnant adults:      A1C <7% lower or higher target A1C values maybe appropriate for individual patients.  For the diagnosis of diabetes mellitus: A1C >/=6.5%  Increased risk for diabetes mellitus:   A1C 5.7-6.4%   08/08/2022 6.5 (H) 4.2 - 6.0 % Final     Comment:     For evaluation of glycemic control in known diabetic non-pregnant adults:      A1C <7% lower or higher target A1C values maybe appropriate for individual patients.  For the diagnosis of diabetes mellitus: A1C >/=6.5%  Increased risk for diabetes mellitus:   A1C 5.7-6.4%

## 2025-05-22 ENCOUNTER — RESULTS FOLLOW-UP (OUTPATIENT)
Dept: FAMILY MEDICINE | Facility: CLINIC | Age: 75
End: 2025-05-22

## 2025-06-05 ENCOUNTER — OFFICE VISIT (OUTPATIENT)
Dept: FAMILY MEDICINE | Facility: CLINIC | Age: 75
End: 2025-06-05
Payer: MEDICARE

## 2025-06-05 VITALS
HEART RATE: 60 BPM | OXYGEN SATURATION: 93 % | SYSTOLIC BLOOD PRESSURE: 138 MMHG | WEIGHT: 188 LBS | BODY MASS INDEX: 25.47 KG/M2 | HEIGHT: 72 IN | DIASTOLIC BLOOD PRESSURE: 75 MMHG | RESPIRATION RATE: 18 BRPM | TEMPERATURE: 98 F

## 2025-06-05 DIAGNOSIS — N18.4 TYPE 2 DIABETES MELLITUS WITH STAGE 4 CHRONIC KIDNEY DISEASE, WITHOUT LONG-TERM CURRENT USE OF INSULIN: Chronic | ICD-10-CM

## 2025-06-05 DIAGNOSIS — E11.22 TYPE 2 DIABETES MELLITUS WITH STAGE 4 CHRONIC KIDNEY DISEASE, WITHOUT LONG-TERM CURRENT USE OF INSULIN: Chronic | ICD-10-CM

## 2025-06-05 DIAGNOSIS — I10 ESSENTIAL HYPERTENSION: Primary | Chronic | ICD-10-CM

## 2025-06-05 DIAGNOSIS — I50.43 ACUTE ON CHRONIC COMBINED SYSTOLIC AND DIASTOLIC HEART FAILURE: Chronic | ICD-10-CM

## 2025-06-05 PROCEDURE — 99214 OFFICE O/P EST MOD 30 MIN: CPT | Mod: ,,, | Performed by: FAMILY MEDICINE

## 2025-06-05 RX ORDER — FUROSEMIDE 40 MG/1
40 TABLET ORAL DAILY PRN
COMMUNITY
Start: 2025-05-27

## 2025-06-13 ENCOUNTER — TELEPHONE (OUTPATIENT)
Dept: FAMILY MEDICINE | Facility: CLINIC | Age: 75
End: 2025-06-13
Payer: MEDICARE

## 2025-06-13 NOTE — TELEPHONE ENCOUNTER
Patient called and states that he still works and he sweats a lot at work. Over the last 2-3 weeks, he has had cramps in his left calf at night. He would like to know if OTC potassium pills would help? He had labs one month ago and potassium was WNL. Please advise.

## 2025-06-13 NOTE — TELEPHONE ENCOUNTER
Called patient to notify him of Jailyn's instructions to drink pickle juice and water. No answer. Left a voicemail with instructions per patient request.

## 2025-06-13 NOTE — TELEPHONE ENCOUNTER
Patient left a voicemail on Jailyn's machine asking for someone to give him a call. I called him, and he did not answer. I left a voicemail for him to call back.

## 2025-06-15 PROBLEM — J41.1 MUCOPURULENT CHRONIC BRONCHITIS: Chronic | Status: ACTIVE | Noted: 2023-12-11

## 2025-08-04 ENCOUNTER — TELEPHONE (OUTPATIENT)
Dept: FAMILY MEDICINE | Facility: CLINIC | Age: 75
End: 2025-08-04
Payer: MEDICARE

## 2025-08-04 DIAGNOSIS — D50.8 OTHER IRON DEFICIENCY ANEMIA: ICD-10-CM

## 2025-08-04 NOTE — TELEPHONE ENCOUNTER
Copied from CRM #[id number]. Topic: Medications - Medication Refill  >> Aug 4, 2025 10:44 AM Liliana wrote:  Who Called: Theodore Begum    Refill or New Rx:Refill  RX Name and Strength:  FEROSUL 325 mg (65 mg iron) Tab tablet     Local or Mail Order:local  [facility]pharm[facility] #[id number] - [place], [address] [address]Wfvio: [phone] Fax: [phone]: Not open 24 hours     Ordering Provider:Jailyn Mulligan      Preferred Method of Contact: Phone Call  Patient's Preferred Phone Number on File: [phone]

## 2025-08-05 RX ORDER — FERROUS SULFATE 325(65) MG
325 TABLET ORAL 2 TIMES DAILY
Qty: 180 TABLET | Refills: 1 | Status: SHIPPED | OUTPATIENT
Start: 2025-08-05

## 2025-08-27 ENCOUNTER — TELEPHONE (OUTPATIENT)
Dept: FAMILY MEDICINE | Facility: CLINIC | Age: 75
End: 2025-08-27
Payer: MEDICARE

## 2025-08-27 DIAGNOSIS — I10 ESSENTIAL HYPERTENSION: Primary | ICD-10-CM

## 2025-08-28 RX ORDER — LOSARTAN POTASSIUM 50 MG/1
50 TABLET ORAL DAILY
Qty: 30 TABLET | Refills: 0 | Status: SHIPPED | OUTPATIENT
Start: 2025-08-28 | End: 2026-08-28